# Patient Record
Sex: FEMALE | Race: WHITE | NOT HISPANIC OR LATINO | Employment: OTHER | URBAN - METROPOLITAN AREA
[De-identification: names, ages, dates, MRNs, and addresses within clinical notes are randomized per-mention and may not be internally consistent; named-entity substitution may affect disease eponyms.]

---

## 2023-10-01 ENCOUNTER — HOSPITAL ENCOUNTER (INPATIENT)
Facility: HOSPITAL | Age: 68
LOS: 10 days | Discharge: NURSING, REHAB OR LONG-TERM CARE | DRG: 871 | End: 2023-10-12
Attending: STUDENT IN AN ORGANIZED HEALTH CARE EDUCATION/TRAINING PROGRAM | Admitting: INTERNAL MEDICINE
Payer: MEDICAID

## 2023-10-01 ENCOUNTER — APPOINTMENT (OUTPATIENT)
Dept: RADIOLOGY | Facility: HOSPITAL | Age: 68
DRG: 871 | End: 2023-10-01
Payer: MEDICAID

## 2023-10-01 DIAGNOSIS — I69.359 CVA, OLD, HEMIPARESIS (MULTI): ICD-10-CM

## 2023-10-01 DIAGNOSIS — F41.9 ANXIETY: ICD-10-CM

## 2023-10-01 DIAGNOSIS — J96.90 RESPIRATORY FAILURE (MULTI): Primary | ICD-10-CM

## 2023-10-01 DIAGNOSIS — J98.11 COLLAPSE OF LEFT LUNG: ICD-10-CM

## 2023-10-01 DIAGNOSIS — R13.12 OROPHARYNGEAL DYSPHAGIA: ICD-10-CM

## 2023-10-01 DIAGNOSIS — A41.9 SEPSIS, DUE TO UNSPECIFIED ORGANISM, UNSPECIFIED WHETHER ACUTE ORGAN DYSFUNCTION PRESENT (MULTI): ICD-10-CM

## 2023-10-01 DIAGNOSIS — J44.1 COPD EXACERBATION (MULTI): ICD-10-CM

## 2023-10-01 DIAGNOSIS — J18.9 PNEUMONIA DUE TO INFECTIOUS ORGANISM, UNSPECIFIED LATERALITY, UNSPECIFIED PART OF LUNG: ICD-10-CM

## 2023-10-01 DIAGNOSIS — J90 PLEURAL EFFUSION ON LEFT: ICD-10-CM

## 2023-10-01 DIAGNOSIS — Z09 HOSPITAL DISCHARGE FOLLOW-UP: ICD-10-CM

## 2023-10-01 DIAGNOSIS — J96.10 CHRONIC RESPIRATORY FAILURE, UNSPECIFIED WHETHER WITH HYPOXIA OR HYPERCAPNIA (MULTI): ICD-10-CM

## 2023-10-01 DIAGNOSIS — J96.11 CHRONIC RESPIRATORY FAILURE WITH HYPOXIA (MULTI): ICD-10-CM

## 2023-10-01 PROCEDURE — 96375 TX/PRO/DX INJ NEW DRUG ADDON: CPT

## 2023-10-01 PROCEDURE — 96365 THER/PROPH/DIAG IV INF INIT: CPT

## 2023-10-01 PROCEDURE — 99285 EMERGENCY DEPT VISIT HI MDM: CPT | Mod: 25 | Performed by: STUDENT IN AN ORGANIZED HEALTH CARE EDUCATION/TRAINING PROGRAM

## 2023-10-01 PROCEDURE — 96367 TX/PROPH/DG ADDL SEQ IV INF: CPT

## 2023-10-01 PROCEDURE — 71045 X-RAY EXAM CHEST 1 VIEW: CPT | Mod: FR

## 2023-10-01 PROCEDURE — 71045 X-RAY EXAM CHEST 1 VIEW: CPT | Mod: FOREIGN READ | Performed by: RADIOLOGY

## 2023-10-01 RX ORDER — VANCOMYCIN HYDROCHLORIDE 1 G/200ML
1 INJECTION, SOLUTION INTRAVENOUS ONCE
Status: COMPLETED | OUTPATIENT
Start: 2023-10-01 | End: 2023-10-02

## 2023-10-01 ASSESSMENT — LIFESTYLE VARIABLES
EVER HAD A DRINK FIRST THING IN THE MORNING TO STEADY YOUR NERVES TO GET RID OF A HANGOVER: NO
EVER FELT BAD OR GUILTY ABOUT YOUR DRINKING: NO
HAVE PEOPLE ANNOYED YOU BY CRITICIZING YOUR DRINKING: NO
HAVE YOU EVER FELT YOU SHOULD CUT DOWN ON YOUR DRINKING: NO

## 2023-10-01 ASSESSMENT — COLUMBIA-SUICIDE SEVERITY RATING SCALE - C-SSRS
6. HAVE YOU EVER DONE ANYTHING, STARTED TO DO ANYTHING, OR PREPARED TO DO ANYTHING TO END YOUR LIFE?: NO
2. HAVE YOU ACTUALLY HAD ANY THOUGHTS OF KILLING YOURSELF?: NO
1. IN THE PAST MONTH, HAVE YOU WISHED YOU WERE DEAD OR WISHED YOU COULD GO TO SLEEP AND NOT WAKE UP?: NO

## 2023-10-01 ASSESSMENT — PAIN DESCRIPTION - LOCATION: LOCATION: BACK

## 2023-10-01 ASSESSMENT — PAIN SCALES - GENERAL: PAINLEVEL_OUTOF10: 4

## 2023-10-01 ASSESSMENT — PAIN - FUNCTIONAL ASSESSMENT: PAIN_FUNCTIONAL_ASSESSMENT: 0-10

## 2023-10-01 ASSESSMENT — PAIN DESCRIPTION - PAIN TYPE: TYPE: CHRONIC PAIN

## 2023-10-02 ENCOUNTER — APPOINTMENT (OUTPATIENT)
Dept: RADIOLOGY | Facility: HOSPITAL | Age: 68
DRG: 871 | End: 2023-10-02
Payer: MEDICAID

## 2023-10-02 PROBLEM — F03.92 DEMENTIA WITH PSYCHOSIS (MULTI): Status: ACTIVE | Noted: 2022-08-01

## 2023-10-02 PROBLEM — J96.90 RESPIRATORY FAILURE (MULTI): Status: ACTIVE | Noted: 2023-10-02

## 2023-10-02 PROBLEM — D50.9 IRON DEFICIENCY ANEMIA: Chronic | Status: ACTIVE | Noted: 2023-10-02

## 2023-10-02 PROBLEM — E43 SEVERE PROTEIN-CALORIE MALNUTRITION (MULTI): Status: ACTIVE | Noted: 2022-08-01

## 2023-10-02 LAB
ABO GROUP (TYPE) IN BLOOD: NORMAL
ACANTHOCYTES BLD QL SMEAR: NORMAL
ALBUMIN SERPL BCP-MCNC: 3.8 G/DL (ref 3.4–5)
ALBUMIN SERPL BCP-MCNC: 4.1 G/DL (ref 3.4–5)
ALP SERPL-CCNC: 121 U/L (ref 33–136)
ALP SERPL-CCNC: 147 U/L (ref 33–136)
ALT SERPL W P-5'-P-CCNC: 18 U/L (ref 7–45)
ALT SERPL W P-5'-P-CCNC: 18 U/L (ref 7–45)
ANION GAP BLDV CALCULATED.4IONS-SCNC: 11 MMOL/L (ref 10–25)
ANION GAP BLDV CALCULATED.4IONS-SCNC: 9 MMOL/L (ref 10–25)
ANION GAP SERPL CALC-SCNC: 15 MMOL/L (ref 10–20)
ANION GAP SERPL CALC-SCNC: 16 MMOL/L (ref 10–20)
ANTIBODY SCREEN: NORMAL
APPARATUS: ABNORMAL
APPARATUS: ABNORMAL
APPEARANCE UR: CLEAR
AST SERPL W P-5'-P-CCNC: 27 U/L (ref 9–39)
AST SERPL W P-5'-P-CCNC: 37 U/L (ref 9–39)
BACTERIA #/AREA URNS AUTO: ABNORMAL /HPF
BASE EXCESS BLDV CALC-SCNC: -4.4 MMOL/L (ref -2–3)
BASE EXCESS BLDV CALC-SCNC: 0.3 MMOL/L (ref -2–3)
BASOPHILS # BLD AUTO: 0.02 X10*3/UL (ref 0–0.1)
BASOPHILS # BLD AUTO: 0.05 X10*3/UL (ref 0–0.1)
BASOPHILS NFR BLD AUTO: 0.3 %
BASOPHILS NFR BLD AUTO: 0.4 %
BILIRUB SERPL-MCNC: 0.3 MG/DL (ref 0–1.2)
BILIRUB SERPL-MCNC: 0.5 MG/DL (ref 0–1.2)
BILIRUB UR STRIP.AUTO-MCNC: NEGATIVE MG/DL
BLOOD EXPIRATION DATE: NORMAL
BLOOD EXPIRATION DATE: NORMAL
BNP SERPL-MCNC: 885 PG/ML (ref 0–99)
BODY TEMPERATURE: 37 DEGREES CELSIUS
BODY TEMPERATURE: 37 DEGREES CELSIUS
BUN SERPL-MCNC: 16 MG/DL (ref 6–23)
BUN SERPL-MCNC: 18 MG/DL (ref 6–23)
CA-I BLDV-SCNC: 1.01 MMOL/L (ref 1.1–1.33)
CA-I BLDV-SCNC: 1.28 MMOL/L (ref 1.1–1.33)
CALCIUM SERPL-MCNC: 8.5 MG/DL (ref 8.6–10.3)
CALCIUM SERPL-MCNC: 9 MG/DL (ref 8.6–10.3)
CARDIAC TROPONIN I PNL SERPL HS: 1201 NG/L (ref 0–13)
CARDIAC TROPONIN I PNL SERPL HS: 2074 NG/L (ref 0–13)
CHLORIDE BLDV-SCNC: 102 MMOL/L (ref 98–107)
CHLORIDE BLDV-SCNC: 110 MMOL/L (ref 98–107)
CHLORIDE SERPL-SCNC: 100 MMOL/L (ref 98–107)
CHLORIDE SERPL-SCNC: 104 MMOL/L (ref 98–107)
CO2 SERPL-SCNC: 22 MMOL/L (ref 21–32)
CO2 SERPL-SCNC: 26 MMOL/L (ref 21–32)
COLOR UR: ABNORMAL
CREAT SERPL-MCNC: 0.82 MG/DL (ref 0.5–1.05)
CREAT SERPL-MCNC: 0.94 MG/DL (ref 0.5–1.05)
D DIMER PPP FEU-MCNC: 1950 NG/ML FEU
DACRYOCYTES BLD QL SMEAR: NORMAL
DISPENSE STATUS: NORMAL
DISPENSE STATUS: NORMAL
EOSINOPHIL # BLD AUTO: 0.01 X10*3/UL (ref 0–0.7)
EOSINOPHIL # BLD AUTO: 0.24 X10*3/UL (ref 0–0.7)
EOSINOPHIL NFR BLD AUTO: 0.1 %
EOSINOPHIL NFR BLD AUTO: 1.8 %
ERYTHROCYTE [DISTWIDTH] IN BLOOD BY AUTOMATED COUNT: 17.4 % (ref 11.5–14.5)
ERYTHROCYTE [DISTWIDTH] IN BLOOD BY AUTOMATED COUNT: 17.7 % (ref 11.5–14.5)
ERYTHROCYTE [DISTWIDTH] IN BLOOD BY AUTOMATED COUNT: 18.9 % (ref 11.5–14.5)
FERRITIN SERPL-MCNC: 19 NG/ML (ref 8–150)
GFR SERPL CREATININE-BSD FRML MDRD: 65 ML/MIN/1.73M*2
GFR SERPL CREATININE-BSD FRML MDRD: 77 ML/MIN/1.73M*2
GIANT PLATELETS BLD QL SMEAR: NORMAL
GLUCOSE BLDV-MCNC: 145 MG/DL (ref 74–99)
GLUCOSE BLDV-MCNC: 149 MG/DL (ref 74–99)
GLUCOSE SERPL-MCNC: 140 MG/DL (ref 74–99)
GLUCOSE SERPL-MCNC: 156 MG/DL (ref 74–99)
GLUCOSE UR STRIP.AUTO-MCNC: ABNORMAL MG/DL
HCO3 BLDV-SCNC: 20 MMOL/L (ref 22–26)
HCO3 BLDV-SCNC: 26.6 MMOL/L (ref 22–26)
HCT VFR BLD AUTO: 20.1 % (ref 36–46)
HCT VFR BLD AUTO: 24.2 % (ref 36–46)
HCT VFR BLD AUTO: 28.9 % (ref 36–46)
HCT VFR BLD EST: 14 % (ref 36–46)
HCT VFR BLD EST: 25 % (ref 36–46)
HEMOCCULT SP1 STL QL: POSITIVE
HGB BLD-MCNC: 6 G/DL (ref 12–16)
HGB BLD-MCNC: 7.2 G/DL (ref 12–16)
HGB BLD-MCNC: 8.9 G/DL (ref 12–16)
HGB BLDV-MCNC: 4.7 G/DL (ref 12–16)
HGB BLDV-MCNC: 8.4 G/DL (ref 12–16)
HYPOCHROMIA BLD QL SMEAR: NORMAL
IMM GRANULOCYTES # BLD AUTO: 0.03 X10*3/UL (ref 0–0.7)
IMM GRANULOCYTES # BLD AUTO: 0.05 X10*3/UL (ref 0–0.7)
IMM GRANULOCYTES NFR BLD AUTO: 0.4 % (ref 0–0.9)
IMM GRANULOCYTES NFR BLD AUTO: 0.4 % (ref 0–0.9)
INHALED O2 CONCENTRATION: 100 %
INHALED O2 CONCENTRATION: 28 %
IRON SATN MFR SERPL: 4 % (ref 25–45)
IRON SERPL-MCNC: 18 UG/DL (ref 35–150)
KETONES UR STRIP.AUTO-MCNC: NEGATIVE MG/DL
LACTATE BLDV-SCNC: 2 MMOL/L (ref 0.4–2)
LACTATE BLDV-SCNC: 2.4 MMOL/L (ref 0.4–2)
LACTATE SERPL-SCNC: 2 MMOL/L (ref 0.4–2)
LEUKOCYTE ESTERASE UR QL STRIP.AUTO: ABNORMAL
LYMPHOCYTES # BLD AUTO: 0.43 X10*3/UL (ref 1.2–4.8)
LYMPHOCYTES # BLD AUTO: 1.55 X10*3/UL (ref 1.2–4.8)
LYMPHOCYTES NFR BLD AUTO: 11.8 %
LYMPHOCYTES NFR BLD AUTO: 5.8 %
MAGNESIUM SERPL-MCNC: 1.8 MG/DL (ref 1.6–2.4)
MCH RBC QN AUTO: 22 PG (ref 26–34)
MCH RBC QN AUTO: 22.9 PG (ref 26–34)
MCH RBC QN AUTO: 23.9 PG (ref 26–34)
MCHC RBC AUTO-ENTMCNC: 29.8 G/DL (ref 32–36)
MCHC RBC AUTO-ENTMCNC: 29.9 G/DL (ref 32–36)
MCHC RBC AUTO-ENTMCNC: 30.8 G/DL (ref 32–36)
MCV RBC AUTO: 74 FL (ref 80–100)
MCV RBC AUTO: 77 FL (ref 80–100)
MCV RBC AUTO: 78 FL (ref 80–100)
MONOCYTES # BLD AUTO: 0.05 X10*3/UL (ref 0.1–1)
MONOCYTES # BLD AUTO: 0.75 X10*3/UL (ref 0.1–1)
MONOCYTES NFR BLD AUTO: 0.7 %
MONOCYTES NFR BLD AUTO: 5.7 %
NEUTROPHILS # BLD AUTO: 10.47 X10*3/UL (ref 1.2–7.7)
NEUTROPHILS # BLD AUTO: 6.91 X10*3/UL (ref 1.2–7.7)
NEUTROPHILS NFR BLD AUTO: 79.9 %
NEUTROPHILS NFR BLD AUTO: 92.7 %
NITRITE UR QL STRIP.AUTO: NEGATIVE
NRBC BLD-RTO: 0 /100 WBCS (ref 0–0)
OVALOCYTES BLD QL SMEAR: NORMAL
OXYHGB MFR BLDV: 45.3 % (ref 45–75)
OXYHGB MFR BLDV: 87.8 % (ref 45–75)
PCO2 BLDV: 33 MM HG (ref 41–51)
PCO2 BLDV: 54 MM HG (ref 41–51)
PH BLDV: 7.3 PH (ref 7.33–7.43)
PH BLDV: 7.39 PH (ref 7.33–7.43)
PH UR STRIP.AUTO: 7 [PH]
PHOSPHATE SERPL-MCNC: 3 MG/DL (ref 2.5–4.9)
PLATELET # BLD AUTO: 360 X10*3/UL (ref 150–450)
PLATELET # BLD AUTO: 401 X10*3/UL (ref 150–450)
PLATELET # BLD AUTO: 411 X10*3/UL (ref 150–450)
PMV BLD AUTO: 10.9 FL (ref 7.5–11.5)
PMV BLD AUTO: 11.4 FL (ref 7.5–11.5)
PMV BLD AUTO: 11.9 FL (ref 7.5–11.5)
PO2 BLDV: 37 MM HG (ref 35–45)
PO2 BLDV: 55 MM HG (ref 35–45)
POLYCHROMASIA BLD QL SMEAR: NORMAL
POTASSIUM BLDV-SCNC: 3.2 MMOL/L (ref 3.5–5.3)
POTASSIUM BLDV-SCNC: 3.7 MMOL/L (ref 3.5–5.3)
POTASSIUM SERPL-SCNC: 3.4 MMOL/L (ref 3.5–5.3)
POTASSIUM SERPL-SCNC: 4.1 MMOL/L (ref 3.5–5.3)
PROCALCITONIN SERPL-MCNC: 0.16 NG/ML
PRODUCT BLOOD TYPE: 9500
PRODUCT BLOOD TYPE: 9500
PRODUCT CODE: NORMAL
PRODUCT CODE: NORMAL
PROT SERPL-MCNC: 6.4 G/DL (ref 6.4–8.2)
PROT SERPL-MCNC: 6.8 G/DL (ref 6.4–8.2)
PROT UR STRIP.AUTO-MCNC: ABNORMAL MG/DL
RBC # BLD AUTO: 2.73 X10*6/UL (ref 4–5.2)
RBC # BLD AUTO: 3.14 X10*6/UL (ref 4–5.2)
RBC # BLD AUTO: 3.72 X10*6/UL (ref 4–5.2)
RBC # UR STRIP.AUTO: NEGATIVE /UL
RBC #/AREA URNS AUTO: ABNORMAL /HPF
RBC MORPH BLD: NORMAL
RH FACTOR (ANTIGEN D): NORMAL
SAO2 % BLDV: 46 % (ref 45–75)
SAO2 % BLDV: 90 % (ref 45–75)
SARS-COV-2 RNA RESP QL NAA+PROBE: NOT DETECTED
SCHISTOCYTES BLD QL SMEAR: NORMAL
SODIUM BLDV-SCNC: 136 MMOL/L (ref 136–145)
SODIUM BLDV-SCNC: 136 MMOL/L (ref 136–145)
SODIUM SERPL-SCNC: 137 MMOL/L (ref 136–145)
SODIUM SERPL-SCNC: 139 MMOL/L (ref 136–145)
SP GR UR STRIP.AUTO: 1.01
TIBC SERPL-MCNC: 418 UG/DL (ref 240–445)
UIBC SERPL-MCNC: 400 UG/DL (ref 110–370)
UNIT ABO: NORMAL
UNIT ABO: NORMAL
UNIT NUMBER: NORMAL
UNIT NUMBER: NORMAL
UNIT RH: NORMAL
UNIT RH: NORMAL
UNIT VOLUME: 350
UNIT VOLUME: 350
UROBILINOGEN UR STRIP.AUTO-MCNC: <2 MG/DL
WBC # BLD AUTO: 13.1 X10*3/UL (ref 4.4–11.3)
WBC # BLD AUTO: 7 X10*3/UL (ref 4.4–11.3)
WBC # BLD AUTO: 7.5 X10*3/UL (ref 4.4–11.3)
WBC #/AREA URNS AUTO: ABNORMAL /HPF
XM INTEP: NORMAL
XM INTEP: NORMAL

## 2023-10-02 PROCEDURE — 94664 DEMO&/EVAL PT USE INHALER: CPT

## 2023-10-02 PROCEDURE — 2500000004 HC RX 250 GENERAL PHARMACY W/ HCPCS (ALT 636 FOR OP/ED)

## 2023-10-02 PROCEDURE — 84075 ASSAY ALKALINE PHOSPHATASE: CPT

## 2023-10-02 PROCEDURE — 2500000002 HC RX 250 W HCPCS SELF ADMINISTERED DRUGS (ALT 637 FOR MEDICARE OP, ALT 636 FOR OP/ED)

## 2023-10-02 PROCEDURE — 2500000005 HC RX 250 GENERAL PHARMACY W/O HCPCS: Performed by: STUDENT IN AN ORGANIZED HEALTH CARE EDUCATION/TRAINING PROGRAM

## 2023-10-02 PROCEDURE — 84100 ASSAY OF PHOSPHORUS: CPT

## 2023-10-02 PROCEDURE — 2500000004 HC RX 250 GENERAL PHARMACY W/ HCPCS (ALT 636 FOR OP/ED): Performed by: PHYSICIAN ASSISTANT

## 2023-10-02 PROCEDURE — 83605 ASSAY OF LACTIC ACID: CPT | Performed by: STUDENT IN AN ORGANIZED HEALTH CARE EDUCATION/TRAINING PROGRAM

## 2023-10-02 PROCEDURE — 84132 ASSAY OF SERUM POTASSIUM: CPT

## 2023-10-02 PROCEDURE — 36415 COLL VENOUS BLD VENIPUNCTURE: CPT | Performed by: STUDENT IN AN ORGANIZED HEALTH CARE EDUCATION/TRAINING PROGRAM

## 2023-10-02 PROCEDURE — 81001 URINALYSIS AUTO W/SCOPE: CPT | Performed by: STUDENT IN AN ORGANIZED HEALTH CARE EDUCATION/TRAINING PROGRAM

## 2023-10-02 PROCEDURE — 85379 FIBRIN DEGRADATION QUANT: CPT | Performed by: STUDENT IN AN ORGANIZED HEALTH CARE EDUCATION/TRAINING PROGRAM

## 2023-10-02 PROCEDURE — 99223 1ST HOSP IP/OBS HIGH 75: CPT | Performed by: INTERNAL MEDICINE

## 2023-10-02 PROCEDURE — 2500000002 HC RX 250 W HCPCS SELF ADMINISTERED DRUGS (ALT 637 FOR MEDICARE OP, ALT 636 FOR OP/ED): Performed by: INTERNAL MEDICINE

## 2023-10-02 PROCEDURE — 36430 TRANSFUSION BLD/BLD COMPNT: CPT

## 2023-10-02 PROCEDURE — 2500000005 HC RX 250 GENERAL PHARMACY W/O HCPCS: Performed by: INTERNAL MEDICINE

## 2023-10-02 PROCEDURE — 84145 PROCALCITONIN (PCT): CPT | Mod: CMCLAB,GEALAB

## 2023-10-02 PROCEDURE — 2550000001 HC RX 255 CONTRASTS: Performed by: STUDENT IN AN ORGANIZED HEALTH CARE EDUCATION/TRAINING PROGRAM

## 2023-10-02 PROCEDURE — 85025 COMPLETE CBC W/AUTO DIFF WBC: CPT

## 2023-10-02 PROCEDURE — P9016 RBC LEUKOCYTES REDUCED: HCPCS

## 2023-10-02 PROCEDURE — 71275 CT ANGIOGRAPHY CHEST: CPT | Mod: FR,ME

## 2023-10-02 PROCEDURE — 94660 CPAP INITIATION&MGMT: CPT

## 2023-10-02 PROCEDURE — 82270 OCCULT BLOOD FECES: CPT | Performed by: STUDENT IN AN ORGANIZED HEALTH CARE EDUCATION/TRAINING PROGRAM

## 2023-10-02 PROCEDURE — 71275 CT ANGIOGRAPHY CHEST: CPT | Mod: FOREIGN READ | Performed by: RADIOLOGY

## 2023-10-02 PROCEDURE — 84484 ASSAY OF TROPONIN QUANT: CPT | Performed by: STUDENT IN AN ORGANIZED HEALTH CARE EDUCATION/TRAINING PROGRAM

## 2023-10-02 PROCEDURE — 86901 BLOOD TYPING SEROLOGIC RH(D): CPT | Performed by: STUDENT IN AN ORGANIZED HEALTH CARE EDUCATION/TRAINING PROGRAM

## 2023-10-02 PROCEDURE — 83880 ASSAY OF NATRIURETIC PEPTIDE: CPT | Performed by: STUDENT IN AN ORGANIZED HEALTH CARE EDUCATION/TRAINING PROGRAM

## 2023-10-02 PROCEDURE — 84132 ASSAY OF SERUM POTASSIUM: CPT | Performed by: STUDENT IN AN ORGANIZED HEALTH CARE EDUCATION/TRAINING PROGRAM

## 2023-10-02 PROCEDURE — 83540 ASSAY OF IRON: CPT

## 2023-10-02 PROCEDURE — 2500000004 HC RX 250 GENERAL PHARMACY W/ HCPCS (ALT 636 FOR OP/ED): Performed by: STUDENT IN AN ORGANIZED HEALTH CARE EDUCATION/TRAINING PROGRAM

## 2023-10-02 PROCEDURE — 87075 CULTR BACTERIA EXCEPT BLOOD: CPT | Mod: CMCLAB,GEALAB | Performed by: STUDENT IN AN ORGANIZED HEALTH CARE EDUCATION/TRAINING PROGRAM

## 2023-10-02 PROCEDURE — 2060000001 HC INTERMEDIATE ICU ROOM DAILY

## 2023-10-02 PROCEDURE — 85025 COMPLETE CBC W/AUTO DIFF WBC: CPT | Performed by: STUDENT IN AN ORGANIZED HEALTH CARE EDUCATION/TRAINING PROGRAM

## 2023-10-02 PROCEDURE — 85027 COMPLETE CBC AUTOMATED: CPT

## 2023-10-02 PROCEDURE — 87635 SARS-COV-2 COVID-19 AMP PRB: CPT | Performed by: STUDENT IN AN ORGANIZED HEALTH CARE EDUCATION/TRAINING PROGRAM

## 2023-10-02 PROCEDURE — P9040 RBC LEUKOREDUCED IRRADIATED: HCPCS

## 2023-10-02 PROCEDURE — 94640 AIRWAY INHALATION TREATMENT: CPT

## 2023-10-02 PROCEDURE — 82728 ASSAY OF FERRITIN: CPT

## 2023-10-02 PROCEDURE — C9113 INJ PANTOPRAZOLE SODIUM, VIA: HCPCS

## 2023-10-02 PROCEDURE — 87040 BLOOD CULTURE FOR BACTERIA: CPT | Mod: CMCLAB,GEALAB | Performed by: STUDENT IN AN ORGANIZED HEALTH CARE EDUCATION/TRAINING PROGRAM

## 2023-10-02 PROCEDURE — 2500000001 HC RX 250 WO HCPCS SELF ADMINISTERED DRUGS (ALT 637 FOR MEDICARE OP)

## 2023-10-02 PROCEDURE — 83735 ASSAY OF MAGNESIUM: CPT

## 2023-10-02 PROCEDURE — 99254 IP/OBS CNSLTJ NEW/EST MOD 60: CPT | Performed by: PHYSICIAN ASSISTANT

## 2023-10-02 PROCEDURE — 86920 COMPATIBILITY TEST SPIN: CPT

## 2023-10-02 RX ORDER — MIRTAZAPINE 15 MG/1
15 TABLET, FILM COATED ORAL EVERY 24 HOURS
COMMUNITY

## 2023-10-02 RX ORDER — IPRATROPIUM BROMIDE AND ALBUTEROL SULFATE 2.5; .5 MG/3ML; MG/3ML
3 SOLUTION RESPIRATORY (INHALATION)
Status: DISCONTINUED | OUTPATIENT
Start: 2023-10-02 | End: 2023-10-02

## 2023-10-02 RX ORDER — ACETAMINOPHEN 325 MG/1
650 TABLET ORAL EVERY 4 HOURS PRN
Status: DISCONTINUED | OUTPATIENT
Start: 2023-10-02 | End: 2023-10-12 | Stop reason: HOSPADM

## 2023-10-02 RX ORDER — ONDANSETRON HYDROCHLORIDE 2 MG/ML
4 INJECTION, SOLUTION INTRAVENOUS EVERY 8 HOURS PRN
Status: DISCONTINUED | OUTPATIENT
Start: 2023-10-02 | End: 2023-10-04

## 2023-10-02 RX ORDER — ACETAMINOPHEN 325 MG/1
325 TABLET ORAL EVERY 6 HOURS PRN
Status: DISCONTINUED | OUTPATIENT
Start: 2023-10-02 | End: 2023-10-05

## 2023-10-02 RX ORDER — ACETAMINOPHEN 325 MG/1
325 TABLET ORAL EVERY 6 HOURS PRN
COMMUNITY

## 2023-10-02 RX ORDER — POTASSIUM CHLORIDE 14.9 MG/ML
20 INJECTION INTRAVENOUS
Status: COMPLETED | OUTPATIENT
Start: 2023-10-02 | End: 2023-10-02

## 2023-10-02 RX ORDER — IPRATROPIUM BROMIDE AND ALBUTEROL SULFATE 2.5; .5 MG/3ML; MG/3ML
3 SOLUTION RESPIRATORY (INHALATION)
Status: DISCONTINUED | OUTPATIENT
Start: 2023-10-02 | End: 2023-10-03

## 2023-10-02 RX ORDER — ONDANSETRON 4 MG/1
4 TABLET, FILM COATED ORAL EVERY 6 HOURS PRN
COMMUNITY

## 2023-10-02 RX ORDER — AMLODIPINE BESYLATE 2.5 MG/1
10 TABLET ORAL
Status: ON HOLD | COMMUNITY
End: 2024-02-05 | Stop reason: ALTCHOICE

## 2023-10-02 RX ORDER — FERROUS SULFATE 325(65) MG
65 TABLET ORAL
Status: DISCONTINUED | OUTPATIENT
Start: 2023-10-02 | End: 2023-10-10

## 2023-10-02 RX ORDER — ATORVASTATIN CALCIUM 80 MG/1
80 TABLET, FILM COATED ORAL NIGHTLY
Status: DISCONTINUED | OUTPATIENT
Start: 2023-10-02 | End: 2023-10-12 | Stop reason: HOSPADM

## 2023-10-02 RX ORDER — FLUTICASONE FUROATE AND VILANTEROL 100; 25 UG/1; UG/1
1 POWDER RESPIRATORY (INHALATION) DAILY
Status: DISCONTINUED | OUTPATIENT
Start: 2023-10-02 | End: 2023-10-12 | Stop reason: HOSPADM

## 2023-10-02 RX ORDER — GUAIFENESIN 100 MG/5ML
100 SOLUTION ORAL 4 TIMES DAILY PRN
COMMUNITY

## 2023-10-02 RX ORDER — PANTOPRAZOLE SODIUM 40 MG/10ML
40 INJECTION, POWDER, LYOPHILIZED, FOR SOLUTION INTRAVENOUS DAILY
Status: DISCONTINUED | OUTPATIENT
Start: 2023-10-03 | End: 2023-10-05

## 2023-10-02 RX ORDER — POTASSIUM CHLORIDE 20 MEQ/1
20 TABLET, EXTENDED RELEASE ORAL DAILY
COMMUNITY

## 2023-10-02 RX ORDER — ATORVASTATIN CALCIUM 80 MG/1
1 TABLET, FILM COATED ORAL NIGHTLY
COMMUNITY
Start: 2022-11-18

## 2023-10-02 RX ORDER — PANTOPRAZOLE SODIUM 40 MG/1
40 TABLET, DELAYED RELEASE ORAL
COMMUNITY
Start: 2022-11-27

## 2023-10-02 RX ORDER — MIRTAZAPINE 15 MG/1
15 TABLET, FILM COATED ORAL EVERY 24 HOURS
Status: DISCONTINUED | OUTPATIENT
Start: 2023-10-02 | End: 2023-10-12 | Stop reason: HOSPADM

## 2023-10-02 RX ORDER — CALCIUM CARBONATE 500(1250)
1 TABLET ORAL EVERY 12 HOURS
COMMUNITY
Start: 2022-08-24

## 2023-10-02 RX ORDER — GUAIFENESIN 100 MG/5ML
100 SOLUTION ORAL 4 TIMES DAILY PRN
Status: DISCONTINUED | OUTPATIENT
Start: 2023-10-02 | End: 2023-10-12 | Stop reason: HOSPADM

## 2023-10-02 RX ORDER — ACETAMINOPHEN 650 MG/1
650 SUPPOSITORY RECTAL EVERY 4 HOURS PRN
Status: DISCONTINUED | OUTPATIENT
Start: 2023-10-02 | End: 2023-10-04

## 2023-10-02 RX ORDER — TRAMADOL HYDROCHLORIDE 50 MG/1
50 TABLET ORAL EVERY 6 HOURS PRN
COMMUNITY
Start: 2022-11-04

## 2023-10-02 RX ORDER — PANTOPRAZOLE SODIUM 40 MG/10ML
80 INJECTION, POWDER, LYOPHILIZED, FOR SOLUTION INTRAVENOUS ONCE
Status: COMPLETED | OUTPATIENT
Start: 2023-10-02 | End: 2023-10-02

## 2023-10-02 RX ORDER — NITROGLYCERIN 0.4 MG/1
0.4 TABLET SUBLINGUAL EVERY 5 MIN PRN
COMMUNITY

## 2023-10-02 RX ORDER — VANCOMYCIN HYDROCHLORIDE 500 MG/100ML
500 INJECTION, SOLUTION INTRAVENOUS EVERY 12 HOURS
Status: DISCONTINUED | OUTPATIENT
Start: 2023-10-02 | End: 2023-10-02

## 2023-10-02 RX ORDER — PREDNISONE 20 MG/1
40 TABLET ORAL DAILY
Status: COMPLETED | OUTPATIENT
Start: 2023-10-02 | End: 2023-10-06

## 2023-10-02 RX ORDER — ASPIRIN 81 MG/1
81 TABLET ORAL 2 TIMES DAILY
COMMUNITY
Start: 2022-11-04

## 2023-10-02 RX ORDER — IPRATROPIUM BROMIDE AND ALBUTEROL SULFATE 2.5; .5 MG/3ML; MG/3ML
3 SOLUTION RESPIRATORY (INHALATION) EVERY 2 HOUR PRN
Status: DISCONTINUED | OUTPATIENT
Start: 2023-10-02 | End: 2023-10-12 | Stop reason: HOSPADM

## 2023-10-02 RX ORDER — AMLODIPINE BESYLATE 5 MG/1
10 TABLET ORAL
Status: DISCONTINUED | OUTPATIENT
Start: 2023-10-02 | End: 2023-10-10

## 2023-10-02 RX ORDER — SERTRALINE HYDROCHLORIDE 25 MG/1
25 TABLET, FILM COATED ORAL DAILY
COMMUNITY

## 2023-10-02 RX ORDER — SERTRALINE HYDROCHLORIDE 50 MG/1
25 TABLET, FILM COATED ORAL DAILY
Status: DISCONTINUED | OUTPATIENT
Start: 2023-10-02 | End: 2023-10-12 | Stop reason: HOSPADM

## 2023-10-02 RX ORDER — ACETAMINOPHEN 500 MG
5 TABLET ORAL NIGHTLY
Status: DISCONTINUED | OUTPATIENT
Start: 2023-10-02 | End: 2023-10-12 | Stop reason: HOSPADM

## 2023-10-02 RX ORDER — ACETAMINOPHEN 160 MG/5ML
650 SOLUTION ORAL EVERY 4 HOURS PRN
Status: DISCONTINUED | OUTPATIENT
Start: 2023-10-02 | End: 2023-10-04

## 2023-10-02 RX ORDER — ONDANSETRON 4 MG/1
4 TABLET, ORALLY DISINTEGRATING ORAL EVERY 8 HOURS PRN
Status: DISCONTINUED | OUTPATIENT
Start: 2023-10-02 | End: 2023-10-12 | Stop reason: HOSPADM

## 2023-10-02 RX ADMIN — IPRATROPIUM BROMIDE AND ALBUTEROL SULFATE 3 ML: 2.5; .5 SOLUTION RESPIRATORY (INHALATION) at 12:11

## 2023-10-02 RX ADMIN — IRON SUCROSE 200 MG: 20 INJECTION, SOLUTION INTRAVENOUS at 15:39

## 2023-10-02 RX ADMIN — PREDNISONE 40 MG: 20 TABLET ORAL at 11:36

## 2023-10-02 RX ADMIN — SERTRALINE HYDROCHLORIDE 25 MG: 50 TABLET ORAL at 11:35

## 2023-10-02 RX ADMIN — TIOTROPIUM BROMIDE INHALATION SPRAY 2 PUFF: 3.12 SPRAY, METERED RESPIRATORY (INHALATION) at 10:14

## 2023-10-02 RX ADMIN — IOHEXOL 70 ML: 350 INJECTION, SOLUTION INTRAVENOUS at 02:10

## 2023-10-02 RX ADMIN — PANTOPRAZOLE SODIUM 80 MG: 40 INJECTION, POWDER, FOR SOLUTION INTRAVENOUS at 06:50

## 2023-10-02 RX ADMIN — Medication 40 %: at 02:30

## 2023-10-02 RX ADMIN — Medication 40 %: at 05:11

## 2023-10-02 RX ADMIN — Medication 5 MG: at 21:42

## 2023-10-02 RX ADMIN — IPRATROPIUM BROMIDE AND ALBUTEROL SULFATE 3 ML: 2.5; .5 SOLUTION RESPIRATORY (INHALATION) at 07:58

## 2023-10-02 RX ADMIN — IPRATROPIUM BROMIDE AND ALBUTEROL SULFATE 3 ML: .5; 3 SOLUTION RESPIRATORY (INHALATION) at 15:08

## 2023-10-02 RX ADMIN — PIPERACILLIN SODIUM AND TAZOBACTAM SODIUM 4.5 G: 4; .5 INJECTION, SOLUTION INTRAVENOUS at 00:23

## 2023-10-02 RX ADMIN — PIPERACILLIN SODIUM AND TAZOBACTAM SODIUM 3.38 G: 3; .375 INJECTION, SOLUTION INTRAVENOUS at 06:50

## 2023-10-02 RX ADMIN — POTASSIUM CHLORIDE 20 MEQ: 14.9 INJECTION, SOLUTION INTRAVENOUS at 11:58

## 2023-10-02 RX ADMIN — POTASSIUM CHLORIDE 20 MEQ: 14.9 INJECTION, SOLUTION INTRAVENOUS at 10:12

## 2023-10-02 RX ADMIN — SODIUM CHLORIDE 1572 ML: 9 INJECTION, SOLUTION INTRAVENOUS at 00:35

## 2023-10-02 RX ADMIN — FLUTICASONE FUROATE AND VILANTEROL TRIFENATATE 1 PUFF: 100; 25 POWDER RESPIRATORY (INHALATION) at 10:14

## 2023-10-02 RX ADMIN — VANCOMYCIN HYDROCHLORIDE 1 G: 1 INJECTION, SOLUTION INTRAVENOUS at 01:20

## 2023-10-02 RX ADMIN — Medication: at 08:00

## 2023-10-02 RX ADMIN — IPRATROPIUM BROMIDE AND ALBUTEROL SULFATE 3 ML: .5; 3 SOLUTION RESPIRATORY (INHALATION) at 19:41

## 2023-10-02 RX ADMIN — METHYLPREDNISOLONE SODIUM SUCCINATE 125 MG: 125 INJECTION, POWDER, FOR SOLUTION INTRAMUSCULAR; INTRAVENOUS at 02:23

## 2023-10-02 RX ADMIN — Medication 2 L/MIN: at 08:00

## 2023-10-02 RX ADMIN — MIRTAZAPINE 15 MG: 15 TABLET, FILM COATED ORAL at 20:45

## 2023-10-02 RX ADMIN — ATORVASTATIN CALCIUM 80 MG: 80 TABLET, FILM COATED ORAL at 20:45

## 2023-10-02 SDOH — HEALTH STABILITY: MENTAL HEALTH: HOW OFTEN DO YOU HAVE 6 OR MORE DRINKS ON ONE OCCASION?: NEVER

## 2023-10-02 SDOH — SOCIAL STABILITY: SOCIAL INSECURITY: DO YOU FEEL UNSAFE GOING BACK TO THE PLACE WHERE YOU ARE LIVING?: NO

## 2023-10-02 SDOH — ECONOMIC STABILITY: INCOME INSECURITY: HOW HARD IS IT FOR YOU TO PAY FOR THE VERY BASICS LIKE FOOD, HOUSING, MEDICAL CARE, AND HEATING?: NOT HARD AT ALL

## 2023-10-02 SDOH — ECONOMIC STABILITY: TRANSPORTATION INSECURITY
IN THE PAST 12 MONTHS, HAS THE LACK OF TRANSPORTATION KEPT YOU FROM MEDICAL APPOINTMENTS OR FROM GETTING MEDICATIONS?: NO

## 2023-10-02 SDOH — ECONOMIC STABILITY: INCOME INSECURITY: IN THE LAST 12 MONTHS, WAS THERE A TIME WHEN YOU WERE NOT ABLE TO PAY THE MORTGAGE OR RENT ON TIME?: NO

## 2023-10-02 SDOH — HEALTH STABILITY: MENTAL HEALTH: HOW MANY STANDARD DRINKS CONTAINING ALCOHOL DO YOU HAVE ON A TYPICAL DAY?: PATIENT DOES NOT DRINK

## 2023-10-02 SDOH — HEALTH STABILITY: MENTAL HEALTH: HOW OFTEN DO YOU HAVE A DRINK CONTAINING ALCOHOL?: NEVER

## 2023-10-02 SDOH — ECONOMIC STABILITY: HOUSING INSECURITY
IN THE LAST 12 MONTHS, WAS THERE A TIME WHEN YOU DID NOT HAVE A STEADY PLACE TO SLEEP OR SLEPT IN A SHELTER (INCLUDING NOW)?: NO

## 2023-10-02 SDOH — ECONOMIC STABILITY: FOOD INSECURITY: WITHIN THE PAST 12 MONTHS, THE FOOD YOU BOUGHT JUST DIDN'T LAST AND YOU DIDN'T HAVE MONEY TO GET MORE.: NEVER TRUE

## 2023-10-02 SDOH — ECONOMIC STABILITY: FOOD INSECURITY: WITHIN THE PAST 12 MONTHS, YOU WORRIED THAT YOUR FOOD WOULD RUN OUT BEFORE YOU GOT MONEY TO BUY MORE.: NEVER TRUE

## 2023-10-02 SDOH — HEALTH STABILITY: PHYSICAL HEALTH: ON AVERAGE, HOW MANY MINUTES DO YOU ENGAGE IN EXERCISE AT THIS LEVEL?: 0 MIN

## 2023-10-02 SDOH — SOCIAL STABILITY: SOCIAL INSECURITY: ABUSE: ADULT

## 2023-10-02 SDOH — HEALTH STABILITY: PHYSICAL HEALTH: ON AVERAGE, HOW MANY DAYS PER WEEK DO YOU ENGAGE IN MODERATE TO STRENUOUS EXERCISE (LIKE A BRISK WALK)?: 0 DAYS

## 2023-10-02 SDOH — ECONOMIC STABILITY: HOUSING INSECURITY: IN THE LAST 12 MONTHS, HOW MANY PLACES HAVE YOU LIVED?: 1

## 2023-10-02 SDOH — ECONOMIC STABILITY: INCOME INSECURITY: IN THE PAST 12 MONTHS, HAS THE ELECTRIC, GAS, OIL, OR WATER COMPANY THREATENED TO SHUT OFF SERVICE IN YOUR HOME?: NO

## 2023-10-02 SDOH — SOCIAL STABILITY: SOCIAL INSECURITY: HAS ANYONE EVER THREATENED TO HURT YOUR FAMILY OR YOUR PETS?: NO

## 2023-10-02 SDOH — ECONOMIC STABILITY: TRANSPORTATION INSECURITY
IN THE PAST 12 MONTHS, HAS LACK OF TRANSPORTATION KEPT YOU FROM MEETINGS, WORK, OR FROM GETTING THINGS NEEDED FOR DAILY LIVING?: NO

## 2023-10-02 SDOH — SOCIAL STABILITY: SOCIAL INSECURITY: ARE THERE ANY APPARENT SIGNS OF INJURIES/BEHAVIORS THAT COULD BE RELATED TO ABUSE/NEGLECT?: NO

## 2023-10-02 SDOH — SOCIAL STABILITY: SOCIAL INSECURITY: DOES ANYONE TRY TO KEEP YOU FROM HAVING/CONTACTING OTHER FRIENDS OR DOING THINGS OUTSIDE YOUR HOME?: NO

## 2023-10-02 SDOH — SOCIAL STABILITY: SOCIAL INSECURITY: HAVE YOU HAD THOUGHTS OF HARMING ANYONE ELSE?: NO

## 2023-10-02 SDOH — SOCIAL STABILITY: SOCIAL INSECURITY: WERE YOU ABLE TO COMPLETE ALL THE BEHAVIORAL HEALTH SCREENINGS?: YES

## 2023-10-02 SDOH — SOCIAL STABILITY: SOCIAL INSECURITY: DO YOU FEEL ANYONE HAS EXPLOITED OR TAKEN ADVANTAGE OF YOU FINANCIALLY OR OF YOUR PERSONAL PROPERTY?: NO

## 2023-10-02 SDOH — SOCIAL STABILITY: SOCIAL INSECURITY: ARE YOU OR HAVE YOU BEEN THREATENED OR ABUSED PHYSICALLY, EMOTIONALLY, OR SEXUALLY BY ANYONE?: NO

## 2023-10-02 ASSESSMENT — PAIN - FUNCTIONAL ASSESSMENT
PAIN_FUNCTIONAL_ASSESSMENT: 0-10

## 2023-10-02 ASSESSMENT — PATIENT HEALTH QUESTIONNAIRE - PHQ9
2. FEELING DOWN, DEPRESSED OR HOPELESS: NOT AT ALL
SUM OF ALL RESPONSES TO PHQ9 QUESTIONS 1 & 2: 0
1. LITTLE INTEREST OR PLEASURE IN DOING THINGS: NOT AT ALL

## 2023-10-02 ASSESSMENT — ACTIVITIES OF DAILY LIVING (ADL)
PATIENT'S MEMORY ADEQUATE TO SAFELY COMPLETE DAILY ACTIVITIES?: NO
HEARING - RIGHT EAR: FUNCTIONAL
ASSISTIVE_DEVICE: WHEELCHAIR
JUDGMENT_ADEQUATE_SAFELY_COMPLETE_DAILY_ACTIVITIES: YES
WALKS IN HOME: DEPENDENT
FEEDING YOURSELF: NEEDS ASSISTANCE
TOILETING: NEEDS ASSISTANCE
DRESSING YOURSELF: NEEDS ASSISTANCE
GROOMING: NEEDS ASSISTANCE
ADEQUATE_TO_COMPLETE_ADL: NO
FEEDING YOURSELF: INDEPENDENT
ASSISTIVE_DEVICE: WHEELCHAIR
TOILETING: NEEDS ASSISTANCE
DRESSING YOURSELF: NEEDS ASSISTANCE
PATIENT'S MEMORY ADEQUATE TO SAFELY COMPLETE DAILY ACTIVITIES?: YES
BATHING: NEEDS ASSISTANCE
JUDGMENT_ADEQUATE_SAFELY_COMPLETE_DAILY_ACTIVITIES: YES
BATHING: NEEDS ASSISTANCE
GROOMING: NEEDS ASSISTANCE
PATIENT'S MEMORY ADEQUATE TO SAFELY COMPLETE DAILY ACTIVITIES?: YES
ADEQUATE_TO_COMPLETE_ADL: YES
BATHING: NEEDS ASSISTANCE
DRESSING YOURSELF: NEEDS ASSISTANCE
GROOMING: NEEDS ASSISTANCE
FEEDING YOURSELF: NEEDS ASSISTANCE
TOILETING: NEEDS ASSISTANCE
JUDGMENT_ADEQUATE_SAFELY_COMPLETE_DAILY_ACTIVITIES: NO
WALKS IN HOME: NEEDS ASSISTANCE
HEARING - LEFT EAR: FUNCTIONAL
ADEQUATE_TO_COMPLETE_ADL: YES
WALKS IN HOME: DEPENDENT

## 2023-10-02 ASSESSMENT — COGNITIVE AND FUNCTIONAL STATUS - GENERAL
MOBILITY SCORE: 12
CLIMB 3 TO 5 STEPS WITH RAILING: TOTAL
MOVING TO AND FROM BED TO CHAIR: A LOT
TURNING FROM BACK TO SIDE WHILE IN FLAT BAD: A LITTLE
STANDING UP FROM CHAIR USING ARMS: A LOT
MOVING FROM LYING ON BACK TO SITTING ON SIDE OF FLAT BED WITH BEDRAILS: A LITTLE
EATING MEALS: A LITTLE
EATING MEALS: A LITTLE
WALKING IN HOSPITAL ROOM: TOTAL
HELP NEEDED FOR BATHING: A LOT
DRESSING REGULAR LOWER BODY CLOTHING: TOTAL
HELP NEEDED FOR BATHING: A LOT
TOILETING: A LOT
DAILY ACTIVITIY SCORE: 14
DRESSING REGULAR UPPER BODY CLOTHING: A LITTLE
MOVING FROM LYING ON BACK TO SITTING ON SIDE OF FLAT BED WITH BEDRAILS: A LITTLE
MOVING TO AND FROM BED TO CHAIR: A LOT
STANDING UP FROM CHAIR USING ARMS: A LOT
PERSONAL GROOMING: A LITTLE
TURNING FROM BACK TO SIDE WHILE IN FLAT BAD: A LITTLE
DAILY ACTIVITIY SCORE: 14
WALKING IN HOSPITAL ROOM: TOTAL
MOBILITY SCORE: 12
DRESSING REGULAR LOWER BODY CLOTHING: TOTAL
PERSONAL GROOMING: A LITTLE
TOILETING: A LOT
CLIMB 3 TO 5 STEPS WITH RAILING: TOTAL
DRESSING REGULAR UPPER BODY CLOTHING: A LITTLE
PATIENT BASELINE BEDBOUND: YES

## 2023-10-02 ASSESSMENT — LIFESTYLE VARIABLES
HOW MANY STANDARD DRINKS CONTAINING ALCOHOL DO YOU HAVE ON A TYPICAL DAY: PATIENT DOES NOT DRINK
HOW OFTEN DO YOU HAVE A DRINK CONTAINING ALCOHOL: NEVER
SKIP TO QUESTIONS 9-10: 1
AUDIT-C TOTAL SCORE: 0
AUDIT-C TOTAL SCORE: 0
SKIP TO QUESTIONS 9-10: 1
AUDIT-C TOTAL SCORE: 0
HOW OFTEN DO YOU HAVE 6 OR MORE DRINKS ON ONE OCCASION: NEVER
PRESCIPTION_ABUSE_PAST_12_MONTHS: NO
AUDIT-C TOTAL SCORE: 0
SUBSTANCE_ABUSE_PAST_12_MONTHS: NO
SKIP TO QUESTIONS 9-10: 1

## 2023-10-02 ASSESSMENT — PAIN SCALES - GENERAL
PAINLEVEL_OUTOF10: 0 - NO PAIN

## 2023-10-02 NOTE — PROGRESS NOTES
Christie Arias is a 68 y.o. 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 Patient does not seem to be good historian. States she woke up this morning feeling short of breath and received breathing treatment at nursing home, which helped a little. Patient was given duonebs by EMS prior to arrival however remained hypoxic.     Of note, patient was recently hospitalized (08/15-) for intermittent chest pain and mucuous plugging. At the time CTA showed near complete collapse of the left lower lobe with opacification in the left lower lobe bronchus. Dr. Greenberg was consulted who advised improvement in mucuous plugging compared to last scan and follow up as outpatient for possible bronchoscopy. Cardiology consulted and recommended patient is not a candidate for invasive procedures and would treat conservatively with Imdur.     Currently, endorses mild SOB, non productive cough lethargy, and cold fingers however denies chest pain, palpitations, nausea/vomiting/hemomptysis, melena/hematochezia, changes to urination or BM.     ROS: 10 point review of systems negative with the exception of above.    ED Course:    Vitals: T36 C     BP 96/64            RR  20     SPO2   100%RA  Labs:  - CBC: WBC 12.1 with neutrophilia, Hgb 6.0, MCV 74  - RFP: unremarkable   - LFTs: unremarkable except , ALT 18, AST 37, T.Bili 0.3  - Blood glucose:140  - PT / INR: 1.2/13.1  - D-dimer   - Lactate: 2.0  - Trop: 2074->1201  - BNP: 885  - VB.30/54/26.6/45.3; suggestive of hypercarbic respiratory acidosis; Venous Hgb 4.7  - Stool Occult blood positive   - UA: small LE/nitrites negative, negative for hematuria or significant proteinuria  - COVID negative   - EKG: Sinus tachycardia with , Qtc 518    Imaging:  -CXR: No acute cardiopulmonary process  -CT  angio PE: No PE; Improved left lower lobe mucous plugging and post obstructive atelectasis since the prior imaging.    Intervention: In ED, patient received 2L NS, IV Vanc/Zosyn, Solumedrol 125mg x1, 2units of pRBC. Patient was placed on BiPAP and was transferred to the floor for further management for suspected upper GIB and acute hypoxic/hyper apneic respiratory failure in the setting of suspected sepsis.    PMH: mentioned above in HPI  PSH: mentioned above in HPI  FH: noncontributory     Social Hx:  - EtOH: Denies  - Tobacco: Denies  - Illicit Drugs: Denies  - Lives at [place]    Subjective          Objective   Constitutional: NAD, cachetic  Resp: dimished BL breath sounds, no wheezing or crackles  CVS: no mgr, RRR  Abd: normal bowel sounds, ND/NT  MSK: no pedal edema  Neuro: AAO x3, non focal neurological exam    Last Recorded Vitals  /86   Pulse (!) 116   Temp 36.7 °C (98.1 °F) (Temporal)   Resp (!) 27   Wt 48.3 kg (106 lb 7.7 oz)   SpO2 100%   Intake/Output last 3 Shifts:    Intake/Output Summary (Last 24 hours) at 10/2/2023 0601  Last data filed at 10/2/2023 0512  Gross per 24 hour   Intake 2122 ml   Output --   Net 2122 ml       Admission Weight  Weight: 52.4 kg (115 lb 8.3 oz) (10/01/23 2306)    Daily Weight  10/02/23 : 48.3 kg (106 lb 7.7 oz)    Image Results  CT angio chest for pulmonary embolism  Narrative: STUDY:  CT Angiogram of the Chest; 10/2/2023 at 2:14 a.m.  INDICATION:  Pulmonary embolism evaluation.  COMPARISON:  One-view CXR 10/1/2023.  CTA CAP 8/15/2023.  ACCESSION NUMBER(S):  [id number]  ORDERING CLINICIAN:  AZUL TERESA  TECHNIQUE:  CTA of the chest was performed with intravenous contrast.   Images are reviewed and processed at a workstation according to the CT  angiogram protocol with 3-D and/or MIP post processing imaging  generated.  Omnipaque 350 100 mL was administered intravenously.  Automated mA/kV exposure control was utilized and patient examination  was performed  in strict accordance with principles of ALARA.  FINDINGS:  Pulmonary arteries are adequately opacified without acute or chronic  filling defects.  Distal thoracic aorta measures 4.3 cm unchanged.  The heart is normal in size without pericardial effusion.  Thoracic  lymph nodes are not enlarged.  There is no pleural effusion, pleural thickening, or pneumothorax.    Improved left lower lobe mucous plugging with residual left basilar  atelectasis.  No evidence of central endobronchial lesion.  Severe  emphysema.  Small amount of right basilar atelectasis.  Lungs are  clear without consolidation, interstitial disease, or suspicious  nodules.  Upper abdomen demonstrates no acute pathology.  There are no acute fractures.  No suspicious bony lesions.  Impression: No acute pulmonary embolism  Distal thoracic aortic aneurysm unchanged measuring 4.3 cm  Improved left lower lobe mucous plugging and postobstructive  atelectasis since the prior.  Signed by Son Singh MD  XR chest 1 view  Narrative: STUDY:  Chest Radiograph;  10/1/2023 11:13PM  INDICATION:  Shortness of breath.  COMPARISON:  XR chest 8/4/2023  ACCESSION NUMBER(S):  [id number]  ORDERING CLINICIAN:  AZUL TERESA  TECHNIQUE:  Frontal chest was obtained at 22:57 hours.  FINDINGS:  CARDIOMEDIASTINAL SILHOUETTE:  Cardiomediastinal silhouette is normal in size and configuration.     LUNGS:  Lungs are clear.  Diffuse chronic parenchymal lung changes are again  apparent.  No large pleural effusion.  No pneumothorax     ABDOMEN:  No remarkable upper abdominal findings.     BONES:  No acute osseous changes.  Impression: No acute pulmonary abnormality.  Signed by Joseph Mcnair MD      Physical Exam  Constitutional: NAD, cachectic appearing  Resp: dimished BL breath sounds, no wheezing   CVS: no mgr, RRR  Abd: normal bowel sounds, ND/NT  MSK: no pedal edema  Neuro: AAO x2, non focal neurological exam    Relevant Results               Assessment/Plan   This patient  currently has cardiac telemetry ordered; if you would like to modify or discontinue the telemetry order, click here to go to the orders activity to modify/discontinue the order.  Christie Arias is a 68 y.o. 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     #Acute hypoxic and hypercapneic respiratory failure likely 2/2 COPD exacerbation  #Sepsis 2/2 unclear source of infection  -Presented with worsening SOB  -Meets ¾ SIRS with tachycardia, tachypnea and leukocytosis  -Possible source of infection: Left lower lobe opacification could likely be developing PNA or bronchial mucous plugging  - Continue Vanc and Zosyn (10/1 - current)  - Pending: procal for abx descalation  -Prednisone 40mg (5days)  - Duonebs PRN, Spiriva, Breo  - Bronchial hygiene (acapella) and incentive spirometry  - Keep SPO2 above 88%  - Saturating on BiPAP; wean as tolerated    #Acute on chronic anemia likely 2/2 JIE, or bone marrow process  - Hgb on admission: 6.0 (baseline 9.0 to 7.8), MCV 74 with FOBT positive   - Iron studies (8/2022): Iron 21, Iron sat 5%, TIBC 388  - Normal BUN:Cr ratio  - s/p 2 units of blood transfusion  - EGD (11/2022) unremarkable; Colonoscopy (11/2022): diverticulosis in sigmoid colon   - IV Protonix 80mg x1   - GI consulted for further eval; appreciate recs    #Non-MI Troponin elevation lilely 2/2 demand ischemia  - EKG on admission: Sinus tachycardia with no ST or T wave morphologies  - Echo (07/2023): Diastolic dysfunction with LVEF 60%  - Trop: 2074->1201  - Pt on telemetry  - Monitor Hgb and transfuse if < 7.0  - Not starting heparin gtt or giving antiplatelets due to possible blood loss anemia    F: s/p 2L NS in ED  E: replete as needed  N: NPO for now  GI ppx: Protonix  DVTppx: none; SCDs    Dispo: Admitted for COPD exacerbation and anemia s/p  transfusion. Est LOS > 48h.                          Yaz Biswas MD

## 2023-10-02 NOTE — CONSULTS
Vancomycin Dosing by Pharmacy- Cessation of Therapy    Consult to pharmacy for vancomycin dosing has been discontinued by the prescriber, pharmacy will sign off at this time.    Please call pharmacy if there are further questions or re-enter a consult if vancomycin is resumed.     Bebeto Gunter, PharmD

## 2023-10-02 NOTE — H&P
Christie Arias is a 68 y.o. 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 Patient does not seem to be good historian. States she woke up this morning feeling short of breath and received breathing treatment at nursing home, which helped a little. Patient was given duonebs by EMS prior to arrival however remained hypoxic.      Of note, patient was recently hospitalized (08/15-) for intermittent chest pain and mucuous plugging. At the time CTA showed near complete collapse of the left lower lobe with opacification in the left lower lobe bronchus. Dr. Greenberg was consulted who advised improvement in mucuous plugging compared to last scan and follow up as outpatient for possible bronchoscopy. Cardiology consulted and recommended patient is not a candidate for invasive procedures and would treat conservatively with Imdur.      Currently, endorses mild SOB, non productive cough lethargy, and cold fingers however denies chest pain, palpitations, nausea/vomiting/hemomptysis, melena/hematochezia, changes to urination or BM.      ROS: 10 point review of systems negative with the exception of above.     ED Course:     Vitals: T36 C     BP 96/64            RR  20     SPO2   100%RA  Labs:  - CBC: WBC 12.1 with neutrophilia, Hgb 6.0, MCV 74  - RFP: unremarkable   - LFTs: unremarkable except , ALT 18, AST 37, T.Bili 0.3  - Blood glucose:140  - PT / INR: 1.2/13.1  - D-dimer   - Lactate: 2.0  - Trop: 2074->1201  - BNP: 885  - VB.30/54/26.6/45.3; suggestive of hypercarbic respiratory acidosis; Venous Hgb 4.7  - Stool Occult blood positive   - UA: small LE/nitrites negative, negative for hematuria or significant proteinuria  - COVID negative   - EKG: Sinus tachycardia with , Qtc 518     Imaging:  -CXR: No acute cardiopulmonary  process  -CT angio PE: No PE; Improved left lower lobe mucous plugging and post obstructive atelectasis since the prior imaging.     Intervention: In ED, patient received 2L NS, IV Vanc/Zosyn, Solumedrol 125mg x1, 2units of pRBC. Patient was placed on BiPAP and was transferred to the floor for further management for suspected upper GIB and acute hypoxic/hyper apneic respiratory failure in the setting of suspected sepsis.     PMH: mentioned above in HPI  PSH: mentioned above in HPI  FH: noncontributory      Social Hx:  - EtOH: Denies  - Tobacco: Denies  - Illicit Drugs: Denies  - Lives at [place]         Physical Exam  Constitutional: NAD, cachetic  Resp: dimished BL breath sounds, no wheezing or crackles  CVS: no mgr, RRR  Abd: normal bowel sounds, ND/NT  MSK: no pedal edema  Neuro: AAO x3, non focal neurological exam     Last Recorded Vitals  /75   Pulse 86   Temp 36.4 °C (97.5 °F) (Temporal)   Resp 18   Wt 48.3 kg (106 lb 7.7 oz)   SpO2 97%     CT angio chest for pulmonary embolism  Narrative: STUDY:  CT Angiogram of the Chest; 10/2/2023 at 2:14 a.m.  INDICATION:  Pulmonary embolism evaluation.  COMPARISON:  One-view CXR 10/1/2023.  CTA CAP 8/15/2023.  ACCESSION NUMBER(S):  [id number]  ORDERING CLINICIAN:  AZUL TERESA  TECHNIQUE:  CTA of the chest was performed with intravenous contrast.   Images are reviewed and processed at a workstation according to the CT  angiogram protocol with 3-D and/or MIP post processing imaging  generated.  Omnipaque 350 100 mL was administered intravenously.  Automated mA/kV exposure control was utilized and patient examination  was performed in strict accordance with principles of ALARA.  FINDINGS:  Pulmonary arteries are adequately opacified without acute or chronic  filling defects.  Distal thoracic aorta measures 4.3 cm unchanged.  The heart is normal in size without pericardial effusion.  Thoracic  lymph nodes are not enlarged.  There is no pleural effusion,  pleural thickening, or pneumothorax.    Improved left lower lobe mucous plugging with residual left basilar  atelectasis.  No evidence of central endobronchial lesion.  Severe  emphysema.  Small amount of right basilar atelectasis.  Lungs are  clear without consolidation, interstitial disease, or suspicious  nodules.  Upper abdomen demonstrates no acute pathology.  There are no acute fractures.  No suspicious bony lesions.  Impression: No acute pulmonary embolism  Distal thoracic aortic aneurysm unchanged measuring 4.3 cm  Improved left lower lobe mucous plugging and postobstructive  atelectasis since the prior.  Signed by Son Singh MD  XR chest 1 view  Narrative: STUDY:  Chest Radiograph;  10/1/2023 11:13PM  INDICATION:  Shortness of breath.  COMPARISON:  XR chest 8/4/2023  ACCESSION NUMBER(S):  [id number]  ORDERING CLINICIAN:  AZUL TERESA  TECHNIQUE:  Frontal chest was obtained at 22:57 hours.  FINDINGS:  CARDIOMEDIASTINAL SILHOUETTE:  Cardiomediastinal silhouette is normal in size and configuration.     LUNGS:  Lungs are clear.  Diffuse chronic parenchymal lung changes are again  apparent.  No large pleural effusion.  No pneumothorax     ABDOMEN:  No remarkable upper abdominal findings.     BONES:  No acute osseous changes.  Impression: No acute pulmonary abnormality.  Signed by Joseph Mcnair MD         Assessment/Plan   Principal Problem:    Respiratory failure (CMS/[facility])    Christie Arias is a 68 y.o. 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      #Acute hypoxic and hypercapneic respiratory failure likely 2/2 COPD exacerbation  #Sepsis 2/2 unclear source of infection  -Presented with worsening SOB  -Meets ¾ SIRS with tachycardia, tachypnea and leukocytosis  -Possible source of infection: Left lower lobe opacification could  likely be developing PNA or bronchial mucous plugging  - Continue Vanc and Zosyn (10/1 - current)  - Pending: procal for abx descalation  -Prednisone 40mg (5days)  - Duonebs PRN, Spiriva, Breo  - Bronchial hygiene (acapella) and incentive spirometry  - Keep SPO2 above 88%  - Saturating on BiPAP; wean as tolerated     #Acute on chronic anemia likely 2/2 JIE or less likely UGIB (PUD, erosive gastritis, esophagitis or AVMs)  - Hgb on admission: 6.0 (baseline 9.0 to 7.8), MCV 74 with FOBT positive   - Iron studies (8/2022): Iron 21, Iron sat 5%, TIBC 388  - Normal BUN:Cr ratio  - s/p 2 units of blood transfusion  - EGD (11/2022) unremarkable; Colonoscopy (11/2022): diverticulosis in sigmoid colon   - IV Protonix 80mg x1   - GI consulted for further eval; appreciate recs     #Non-MI Troponin elevation lilely 2/2 demand ischemia  - EKG on admission: Sinus tachycardia with no ST or T wave morphologies  - Echo (07/2023): Diastolic dysfunction with LVEF 60%  - Trop: 2074->1201  - Pt on telemetry  - Monitor Hgb and transfuse if < 7.0  - Not starting heparin gtt or giving antiplatelets due to possible blood loss anemia     F: s/p 2L NS in ED  E: replete as needed  N: NPO for now  GI ppx: Protonix  DVTppx: none; SCDs     Dispo: Admitted for COPD exacerbation and anemia s/p transfusion. Est LOS > 48h.      Yaz Biswas MD

## 2023-10-02 NOTE — PROGRESS NOTES
"Vancomycin Dosing by Pharmacy- INITIAL    Christie Arias is a 68 y.o. year old female who Pharmacy has been consulted for vancomycin dosing for CNS/meningoencephalitis. Based on the patient's indication and renal status this patient will be dosed based on a goal AUC of 400-600.     Renal function is currently stable.    Visit Vitals  BP 93/72   Pulse 101   Temp 36.1 °C (97 °F) (Temporal)   Resp 20        Lab Results   Component Value Date    CREATININE 0.94 10/02/2023    CREATININE CANCELED 08/17/2023    CREATININE 0.77 08/16/2023    CREATININE 1.00 08/15/2023    CREATININE 0.75 08/05/2023        Patient weight is No results found for: \"PTWEIGHT\"    No results found for: \"CULTURE\"     No intake/output data recorded.  [unfilled]    Lab Results   Component Value Date    PATIENTTEMP 37.0 10/02/2023    PATIENTTEMP 37.0 04/12/2023    PATIENTTEMP 37.0 07/07/2022          Assessment/Plan     Patient will be given a loading dose of 1000 mg.  Will initiate vancomycin maintenance,  500 mg every 12 hours.    This dosing regimen is predicted by [employer] to result in the following pharmacokinetic parameters:    Regimen: 500 mg IV every 12 hours.  Start time: 13:20 on 10/02/2023  Exposure target: AUC24 (range)400-600 mg/L.hr   AUC24,ss: 458 mg/L.hr  Probability of AUC24 > 400: 66 %  Ctrough,ss: 15.4 mg/L  Probability of Ctrough,ss > 20: 25 %  Probability of nephrotoxicity (Lodise JOSÉ MIGUEL 2009): 11 %    Follow-up level will be ordered on 10/3 at 6AM unless clinically indicated sooner.  Will continue to monitor renal function daily while on vancomycin and order serum creatinine at least every 48 hours if not already ordered.  Follow for continued vancomycin needs, clinical response, and signs/symptoms of toxicity.       Jono Williamson, PharmD       "

## 2023-10-02 NOTE — PROGRESS NOTES
10/3/23 @ 1536: 2 liters here, none baseline, not medically ready, spoke with son Jason Arias via phone and confirmed plan is to return to [facility] when medically ready. Referral sent.  Not a precert. Baseline dementia, and history of left sided weakness from Hx CVA.      10/02/23 1406   Discharge Planning   Living Arrangements Other (Comment)   Support Systems Other (Comment)   Assistance Needed LTC at [facility], assist for transfer to wheelchair, room air baseline,   Type of Residence Nursing home/residential care   Care Facility Name [facility] or Post Acute Services Post acute facilities (Rehab/SNF/etc)   Type of Post Acute Facility Services Long term care   Patient expects to be discharged to: [facility], no precert needed   Does the patient need discharge transport arranged? Yes   RoundTrip coordination needed? Yes   Has discharge transport been arranged? No     10/2/23 @ 1418: Call to son to confirm plan is to return to [facility], no answer, no voicemail, await return call.

## 2023-10-02 NOTE — CONSULTS
Consults    Reason For Consult  Anemia requiring blood transfusion    History Of Present Illness  Christie Arias is a 68 y.o. female presenting with shortness of breath initially requiring BiPAP now on room air.  She has a history of dementia, malnutrition, COPD, CVA, thoracic aorta aneurysm.  Presented with shortness of breath.  Today she reports she feels much better.  She denies any abdominal pain, nausea, vomiting, fever, chills, melena, BRBPR, unintentional weight loss, GERD, dysphagia.  She did undergo EGD and colonoscopy in November 2022 which noted normal stomach, esophagus, duodenum.  Colonoscopy noted diverticulosis.  Biopsies were unremarkable.  Labs remarkable for hemoglobin 6.0 upon admission status post 2 units PRBC and has incremented to 7.2.  No BUN/creatinine ratio elevation.  CTA of the chest noted no pulmonary embolism. CT CAP in July 2023 noted no acute GI pathology.  Iron studies note low iron, percent saturation, normal TIBC.  Per nursing, there have been no bowel movements since admission or signs of GI bleeding. Patient reports she feels hungry and would like to eat.      Past Medical History  She has a past medical history of Personal history of other diseases of the digestive system.    Surgical History  She has a past surgical history that includes Other surgical history (07/22/2022); Other surgical history (07/22/2022); Other surgical history (07/22/2022); and CT angio abdomen pelvis w and or wo IV IV contrast (4/12/2023).     Social History  She reports that she has never smoked. She has never used smokeless tobacco. No history on file for alcohol use and drug use.    Family History  No family history on file.     Allergies  Codeine    Review of Systems    I performed a complete 10 point review of systems and it is negative except as noted in HPI.  All other systems have been reviewed and are negative.    Physical Exam   /75   Pulse 86   Temp 36.4 °C (97.5 °F) (Temporal)    "Resp 18   Ht 1.753 m (5' 9\")   Wt 48.3 kg (106 lb 7.7 oz)   SpO2 93%   BMI 15.72 kg/m²   Constitutional: frail, cachectic appearing. NAD. Alert and cooperative  Skin: no jaundice   Eyes: anicteric, normal conjunctiva  ENT: MMM  Pulmonary: easy and nonlabored on RA  Abdomen: soft, NT, ND. No ascites.  MSK: MAEx4  Extremities: no edema  Neuro: aaox3. No asterixis.   Psych: appropriate mood and behavior    Last Recorded Vitals  Blood pressure 101/75, pulse 86, temperature 36.4 °C (97.5 °F), temperature source Temporal, resp. rate 18, height 1.753 m (5' 9\"), weight 48.3 kg (106 lb 7.7 oz), SpO2 93 %.    Relevant Results  @  Lab Results   Component Value Date    WBC 7.5 10/02/2023    HGB 7.2 (L) 10/02/2023    HCT 24.2 (L) 10/02/2023    MCV 77 (L) 10/02/2023     10/02/2023      Lab Results   Component Value Date    GLUCOSE 156 (H) 10/02/2023    CALCIUM 8.5 (L) 10/02/2023     10/02/2023    K 3.4 (L) 10/02/2023    CO2 22 10/02/2023     10/02/2023    BUN 16 10/02/2023    CREATININE 0.82 10/02/2023      Lab Results   Component Value Date    ALT 18 10/02/2023    AST 27 10/02/2023    ALKPHOS 121 10/02/2023    BILITOT 0.5 10/02/2023    === 10/01/23 ===    CT ANGIO CHEST FOR PULMONARY EMBOLISM    - Impression -  No acute pulmonary embolism  Distal thoracic aortic aneurysm unchanged measuring 4.3 cm  Improved left lower lobe mucous plugging and postobstructive  atelectasis since the prior.  Signed by Son Singh MD === 10/01/23 ===    XR CHEST 1 VIEW    - Impression -  No acute pulmonary abnormality.  Signed by Joseph Mcnair MD      Assessment/Plan     68-year-old female admitted for COPD exacerbation.  Found to have acute on chronic anemia requiring blood transfusion. Hemoglobin 7.2 status post 2 units PRBCs.  Low suspicion for active GI bleeding, possible occult losses from gastritis, PUD although did have negative EGD and colonoscopy in November 2022.  Would suspect anemia is multifactorial r/t bone " marrow suppression, anemia of chronic disease, iron malabsorption.    -No plan for endoscopy at this time given negative scopes less than 1 year ago, no signs of active GI bleeding  -PPI daily  -Regular diet  -CBC, BMP daily  -Agree with dietitian consult  -Would recommend IV iron today instead of oral iron  -Trend CBC and transfuse hemoglobin less than 7  -Supportive care per primary    Thank you for the consult. GI will sign off Please dochalo with any questions.    Plan discussed with Dr. Scott, who is in agreement.     Linh Daniels PA-C     I spent 60 minutes in the professional and overall care of this patient.

## 2023-10-02 NOTE — CARE PLAN
The patient's goals for the shift include  SOB    The clinical goals for the shift include decrease oxygen demand, blood administration    Over the shift, the patient did not make progress toward the following goals. Barriers to progression include lack of motivation. Recommendations to address these barriers include encouragement to participate in care.

## 2023-10-02 NOTE — NURSING NOTE
Team notified that pt is more tachycardic than this morning, HR is 115 bpm, /74, Dr. Sandoval to bedside. No new orders at this time.

## 2023-10-02 NOTE — HOSPITAL COURSE
Christie Arias is a 68 y.o. 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 On presentation, T 38, BP 96/64, , RR 20, pOx 100% on non-rebreather. Labs notable for WBC 12.1 with left shift, D-dimer 1950, lactate 2.0, troponin 2074 -> 1201, , VBG showing hypercarbic respiratory acidosis. EKG notable for prolonged Qtc 518. CXR showed no acute process, CT angio PE showed no PE, improved LL lobe mucous plugging and post-obstructive atelectasis since the prior imaging. Patient received 2L NS, IV Vanc/Zosyn, Solumedrol 125mg x1, 2units of pRBC. Patient was placed on BiPAP and was transferred to the floor for further management for suspected upper GIB and acute hypoxic/hyper apneic respiratory failure in the setting of suspected sepsis. On the floor, weaned to NC and started on prednisone. Antibiotics discontinued given low concern for infection. GI consulted and saw no signs of GI bleed. Pt started on iron supplementation given labs showing iron deficiency.    On 10/3, notified of 1/2 sets of blood cx from admission positive for staph epidermidis. ID consulted, started on vancomycin (10/3), discontinued on 10/5 after repeat blood cultures negative and CRP/ESR WNL. Given persistent episodes of oxygen desaturation despite treating for COPD exacerbation, pulmonology consulted. Recommended continuing prednisone course. Patient was found to have continuous diffuse consolidation of the left lung, so she underwent a bronchoscopy which showed significant improvement of respiratory status. Repeat CXR after bronchoscopy showed left sided pleural effusion, so she underwent a thoracentesis which drained 950 ccs of yellow fluid. After thora, patient had significant improvement of respiratory status. 2 nights prior to discharge, the patient had an  episode of worsening pulmonary edema, was given a dose of IV Lasix 20 with improvement of symptoms. Pulmonology signed off due to improvement of respiratory status, recommending a steroid taper. Discussed with ID, no need for outpatient antibiotics as the patient completed 6 days of Zosyn. She was instructed to continue her home medications as prescribed, and will remain stable on 2L O2 continuously.

## 2023-10-02 NOTE — SIGNIFICANT EVENT
Updated Assessment & Plan    Christie Arias is a 68 y.o. 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      #Acute hypoxic and hypercapneic respiratory failure likely 2/2 COPD exacerbation  Unlikely infection given no fever and interval improvement of LLL opacification/mucous plugging on CT PE  - discontinue Vanc and Zosyn (10/1 - 10/2)  - S/p solumedrol in ED  - Start Prednisone 40mg (5days) (10/2 - current)  - Duonebs PRN, Spiriva, Breo  - Bronchial hygiene (acapella) and incentive spirometry  - Keep SPO2 above 88%  - Weaned from BiPap to 2L NC, now weaned to RA     #Acute on chronic anemia likely 2/2 JIE  Less likely UGIB given no clinical signs or MDS given only RBCs affected  - Hgb on admission: 6.0 (baseline 9.0 to 7.8), MCV 74 with FOBT positive   - Iron studies (8/2022): Iron 21, Iron sat 5%, TIBC 388  - EGD (11/2022) unremarkable; Colonoscopy (11/2022): diverticulosis in sigmoid colon   - Normal BUN:Cr ratio  - s/p 2 units of blood transfusion  - IV Protonix 80mg x1 -> IV protonix 40mg daily  - Repeat iron studies (10/2/2022): iron 18, % sat 4, TIBC 418, ferritin 19  - Start daily iron supplementation     #Non-MI Troponin elevation likely 2/2 demand ischemia  - EKG on admission: Sinus tachycardia with no ST or T wave morphologies  - Echo (07/2023): Diastolic dysfunction with LVEF 60%  - Trop: 2074->1201  - Pt on telemetry  - Monitor Hgb and transfuse if < 7.0  - Not starting heparin gtt or giving antiplatelets due to possible blood loss anemia     #Failure to thrive  - consult nutrition    Chronic medical conditions  #HTN: hold home amlodipine 10mg given soft BPs  #MDD: cont zoloft and remeron    F: s/p 2L NS in ED  E: replete as needed  N: NPO for now  GI ppx: Protonix  DVTppx: none; SCDs     Dispo: Admitted for COPD exacerbation and  anemia s/p transfusion. Est LOS > 48h.

## 2023-10-02 NOTE — ED PROVIDER NOTES
HPI   Chief Complaint   Patient presents with    Shortness of Breath     Pt came from [place] and was not doing well on her 02       Limitations to history: none  External Records Reviewed      HPI:   The patient is a 68-year-old female past medical history of COPD and pneumonia arriving in respiratory distress.  Patient comes from nursing home where she today states she is short of breath coughing wheezing.  Cough been nonproductive.  She is a history of reported mucous plug which she states she may need surgery for.  EMS also provide some HPI given patient's acuity of her condition.  She was immediately seen upon arrival here.  She was hypoxic after still receiving DuoNebs 2 of them from EMS.  She was then started on BiPAP.  HPI limited given patient's acuity of her condition.       ROS: All other review of systems are negative except as noted above and HPI or ROS.   CONSTITUTIONAL:       fever, chills  ENT:       sore throat, congestion, rhinorrhea  CARDIOVASCULAR:       +chest pain, palpitations, swelling  RESPIRATORY:       +cough, +wheeze, +shortness of breath  GI:       nausea, vomiting, diarrhea, abdominal pain  GENITOURINARY:       dysuria, hematuria, frequency  MUSCULOSKELETAL:       deformity, neck pain  SKIN:       rash, lesion  NEUROLOGIC:       headache, numbness, focal weakness  NOTES: All systems reviewed, negative except as described above       Physical Exam:  GENERAL: Alert, oriented , cooperative    HEAD: normocephalic, atraumatic    SKIN:  dry skin, no lesions.    EYES: PERRL, EOMs intact,  Conjunctiva pink with no erythema or exudates. No scleral icterus.     ENT: No external deformities. Nares patent, mucus membranes moist.  Pharynx clear, uvula midline.     NECK: Supple, without meningismus. Trachea at midline. No lymphadenopathy.    PULMONARY:no crackles, rhonchi.  Tachypnea with scattered wheezing worse on the right side.    CARDIAC: Regular rate and regular rhythm.  Pulses 2+ in radials  and dorsal pedal pulses bilaterally.  No murmur, rub, gallop.  Edema in left foot    ABDOMEN: Soft, nontender, active bowel sounds.  No palpable organomegaly.  No rebound or guarding.  No CVA tenderness.  No pulsatile masses.    : Exam deferred.    MUSCULOSKELETAL: Full range of motion throughout, no deformity.     NEUROLOGICAL:  CN II through XII are grossly intact, no focal neuro deficits.  Strength 5 out of 5 throughout bilateral upper and lower extremities neurovascular intact in bilateral upper and lower extremities    PSYCHIATRIC: Appropriate mood and affect. Calm.       MDM/ED COURSE:      The patient presented for evaluation of shortness of breath.  Differential includes but not limited to COPD, CHF, Pneumonia, viral illness.  Imaging studies if performed were independently reviewed and interpreted by myself and confirmed by radiologist.      Patient requires continued workup and management of their symptoms and will be admitted to the hospital for further evaluation and treatment.           I discussed the differential, results and plan with the patient and/or family/friend/caregiver if present. All questions answered.         Note: This note was dictated by speech recognition. Minor errors in transcription may be present.                    No data recorded                Patient History   Past Medical History:   Diagnosis Date    Personal history of other diseases of the digestive system     History of diverticulitis of colon     Past Surgical History:   Procedure Laterality Date    CT ABDOMEN PELVIS ANGIOGRAM W AND/OR WO IV CONTRAST  4/12/2023    CT ABDOMEN PELVIS ANGIOGRAM W AND/OR WO IV CONTRAST [facility] CT    OTHER SURGICAL HISTORY  07/22/2022    Colectomy    OTHER SURGICAL HISTORY  07/22/2022    Hysterectomy    OTHER SURGICAL HISTORY  07/22/2022    Femorofemoral bypass     No family history on file.  Social History     Tobacco Use    Smoking status: Never    Smokeless tobacco: Never   Substance Use  Topics    Alcohol use: Not on file    Drug use: Not on file       Physical Exam   ED Triage Vitals   Temp Pulse Resp BP   -- -- -- --      SpO2 Temp src Heart Rate Source Patient Position   -- -- -- --      BP Location FiO2 (%)     -- --           ED Course & [facility]   ED Course as of 10/09/23 2001   Mon Oct 02, 2023   0134 Patient found to be anemic here.  She is placed on BiPAP given her hypoxia hypercapnia and acidosis.  Hemoglobin found to be 6.  She was a sepsis alert started on broad-spectrum antibiotics and blood cultures performed.  She has a POA as she has a history of dementia and I tried reaching out to them but their mailbox is full and I did not receive an answer back.  I told the patient at bedside and she did give consent for the blood as her hemoglobin is 6.  We will transfuse her here. [WL]   0139 EKG interpreted by me shows sinus tachycardia.  Positive atrial margin.  Nonspecific ST abnormalities.  No STEMI.  ST depressions in lateral leads.  Rate 102, , QRS 90 QTc 518.  Artifact present.  No prior EKG for comparison [WL]   0353 occult was positive.  Patient suffering from GI bleed.  Consult was made to hospitalist service I spoke with Ene who agreed on admission. [WL]   0354 CT angio showed no PE but unchanged thoracic aortic aneurysm along with improved left lower lobe mucous plugging. [WL]   0354 Elevated troponin may be from the anemia but will hold off on giving any heparin for NSTEMI given the concern for bleed. [WL]   0354 I discussed this with hospitalist service who agrees on plan.  Patient reevaluated at bedside and states she feels better on the BiPAP. [WL]   0416 Reevaluation of sepsis bolus of fluid patient cap refill within normal limits and blood pressure has been within normal limits MAP.   [WL]   0417 she was found to have bacteria along with leukocytosis positive consistent with UTI but already given antibiotics for coverage. [WL]      ED Course User Index  [WL] Osmel Bettencourt,  DO         Diagnoses as of 10/09/23 2001   Respiratory failure (CMS/[facility])   COPD exacerbation (CMS/[facility])   Pneumonia due to infectious organism, unspecified laterality, unspecified part of lung   Sepsis, due to unspecified organism, unspecified whether acute organ dysfunction present (CMS/[facility])           Procedure  Procedures     Osmel Bettencourt DO  10/09/23 2001

## 2023-10-03 LAB
ALBUMIN SERPL BCP-MCNC: 3.6 G/DL (ref 3.4–5)
ANION GAP SERPL CALC-SCNC: 14 MMOL/L (ref 10–20)
BUN SERPL-MCNC: 19 MG/DL (ref 6–23)
CALCIUM SERPL-MCNC: 8.6 MG/DL (ref 8.6–10.3)
CHLORIDE SERPL-SCNC: 105 MMOL/L (ref 98–107)
CO2 SERPL-SCNC: 23 MMOL/L (ref 21–32)
CREAT SERPL-MCNC: 0.98 MG/DL (ref 0.5–1.05)
CRP SERPL-MCNC: 0.84 MG/DL
ERYTHROCYTE [DISTWIDTH] IN BLOOD BY AUTOMATED COUNT: 18 % (ref 11.5–14.5)
ERYTHROCYTE [SEDIMENTATION RATE] IN BLOOD BY WESTERGREN METHOD: 9 MM/H (ref 0–30)
GFR SERPL CREATININE-BSD FRML MDRD: 62 ML/MIN/1.73M*2
GLUCOSE SERPL-MCNC: 109 MG/DL (ref 74–99)
HCT VFR BLD AUTO: 26.1 % (ref 36–46)
HGB BLD-MCNC: 8.3 G/DL (ref 12–16)
MAGNESIUM SERPL-MCNC: 1.88 MG/DL (ref 1.6–2.4)
MCH RBC QN AUTO: 24.2 PG (ref 26–34)
MCHC RBC AUTO-ENTMCNC: 31.8 G/DL (ref 32–36)
MCV RBC AUTO: 76 FL (ref 80–100)
NRBC BLD-RTO: 0 /100 WBCS (ref 0–0)
PHOSPHATE SERPL-MCNC: 3.5 MG/DL (ref 2.5–4.9)
PLATELET # BLD AUTO: 370 X10*3/UL (ref 150–450)
PMV BLD AUTO: 11.5 FL (ref 7.5–11.5)
POTASSIUM SERPL-SCNC: 3.7 MMOL/L (ref 3.5–5.3)
RBC # BLD AUTO: 3.43 X10*6/UL (ref 4–5.2)
SODIUM SERPL-SCNC: 138 MMOL/L (ref 136–145)
WBC # BLD AUTO: 14.1 X10*3/UL (ref 4.4–11.3)

## 2023-10-03 PROCEDURE — 36415 COLL VENOUS BLD VENIPUNCTURE: CPT

## 2023-10-03 PROCEDURE — 94640 AIRWAY INHALATION TREATMENT: CPT

## 2023-10-03 PROCEDURE — 2500000001 HC RX 250 WO HCPCS SELF ADMINISTERED DRUGS (ALT 637 FOR MEDICARE OP)

## 2023-10-03 PROCEDURE — 2060000001 HC INTERMEDIATE ICU ROOM DAILY

## 2023-10-03 PROCEDURE — 97165 OT EVAL LOW COMPLEX 30 MIN: CPT | Mod: GO

## 2023-10-03 PROCEDURE — 87040 BLOOD CULTURE FOR BACTERIA: CPT | Mod: CMCLAB,GEALAB

## 2023-10-03 PROCEDURE — 83735 ASSAY OF MAGNESIUM: CPT

## 2023-10-03 PROCEDURE — 86140 C-REACTIVE PROTEIN: CPT

## 2023-10-03 PROCEDURE — 2500000004 HC RX 250 GENERAL PHARMACY W/ HCPCS (ALT 636 FOR OP/ED)

## 2023-10-03 PROCEDURE — 99232 SBSQ HOSP IP/OBS MODERATE 35: CPT

## 2023-10-03 PROCEDURE — C9113 INJ PANTOPRAZOLE SODIUM, VIA: HCPCS

## 2023-10-03 PROCEDURE — 2500000002 HC RX 250 W HCPCS SELF ADMINISTERED DRUGS (ALT 637 FOR MEDICARE OP, ALT 636 FOR OP/ED)

## 2023-10-03 PROCEDURE — 85027 COMPLETE CBC AUTOMATED: CPT

## 2023-10-03 PROCEDURE — 2500000005 HC RX 250 GENERAL PHARMACY W/O HCPCS: Performed by: INTERNAL MEDICINE

## 2023-10-03 PROCEDURE — 2500000002 HC RX 250 W HCPCS SELF ADMINISTERED DRUGS (ALT 637 FOR MEDICARE OP, ALT 636 FOR OP/ED): Performed by: INTERNAL MEDICINE

## 2023-10-03 PROCEDURE — 85652 RBC SED RATE AUTOMATED: CPT

## 2023-10-03 PROCEDURE — 97162 PT EVAL MOD COMPLEX 30 MIN: CPT | Mod: GP

## 2023-10-03 PROCEDURE — 80069 RENAL FUNCTION PANEL: CPT

## 2023-10-03 RX ORDER — ENOXAPARIN SODIUM 100 MG/ML
30 INJECTION SUBCUTANEOUS DAILY
Status: DISCONTINUED | OUTPATIENT
Start: 2023-10-03 | End: 2023-10-03

## 2023-10-03 RX ORDER — VANCOMYCIN HYDROCHLORIDE 1 G/200ML
1000 INJECTION, SOLUTION INTRAVENOUS EVERY 24 HOURS
Status: DISCONTINUED | OUTPATIENT
Start: 2023-10-03 | End: 2023-10-05

## 2023-10-03 RX ORDER — ENOXAPARIN SODIUM 100 MG/ML
30 INJECTION SUBCUTANEOUS EVERY 24 HOURS
Status: DISCONTINUED | OUTPATIENT
Start: 2023-10-04 | End: 2023-10-12 | Stop reason: HOSPADM

## 2023-10-03 RX ORDER — ENOXAPARIN SODIUM 100 MG/ML
1 INJECTION SUBCUTANEOUS EVERY 12 HOURS
Status: DISCONTINUED | OUTPATIENT
Start: 2023-10-03 | End: 2023-10-03

## 2023-10-03 RX ORDER — IPRATROPIUM BROMIDE AND ALBUTEROL SULFATE 2.5; .5 MG/3ML; MG/3ML
3 SOLUTION RESPIRATORY (INHALATION)
Status: DISCONTINUED | OUTPATIENT
Start: 2023-10-03 | End: 2023-10-04

## 2023-10-03 RX ADMIN — PANTOPRAZOLE SODIUM 40 MG: 40 INJECTION, POWDER, FOR SOLUTION INTRAVENOUS at 09:07

## 2023-10-03 RX ADMIN — ENOXAPARIN SODIUM 30 MG: 30 INJECTION SUBCUTANEOUS at 13:48

## 2023-10-03 RX ADMIN — PREDNISONE 40 MG: 20 TABLET ORAL at 09:07

## 2023-10-03 RX ADMIN — FLUTICASONE FUROATE AND VILANTEROL TRIFENATATE 1 PUFF: 100; 25 POWDER RESPIRATORY (INHALATION) at 09:09

## 2023-10-03 RX ADMIN — Medication 5 MG: at 20:49

## 2023-10-03 RX ADMIN — Medication 1 L/MIN: at 15:48

## 2023-10-03 RX ADMIN — IPRATROPIUM BROMIDE AND ALBUTEROL SULFATE 3 ML: .5; 3 SOLUTION RESPIRATORY (INHALATION) at 08:37

## 2023-10-03 RX ADMIN — IPRATROPIUM BROMIDE AND ALBUTEROL SULFATE 3 ML: .5; 3 SOLUTION RESPIRATORY (INHALATION) at 15:46

## 2023-10-03 RX ADMIN — ATORVASTATIN CALCIUM 80 MG: 80 TABLET, FILM COATED ORAL at 20:48

## 2023-10-03 RX ADMIN — IPRATROPIUM BROMIDE AND ALBUTEROL SULFATE 3 ML: .5; 3 SOLUTION RESPIRATORY (INHALATION) at 13:13

## 2023-10-03 RX ADMIN — SERTRALINE HYDROCHLORIDE 25 MG: 50 TABLET ORAL at 09:08

## 2023-10-03 RX ADMIN — Medication 4 L/MIN: at 08:00

## 2023-10-03 RX ADMIN — TIOTROPIUM BROMIDE INHALATION SPRAY 2 PUFF: 3.12 SPRAY, METERED RESPIRATORY (INHALATION) at 09:11

## 2023-10-03 RX ADMIN — VANCOMYCIN HYDROCHLORIDE 1000 MG: 1 INJECTION, SOLUTION INTRAVENOUS at 13:51

## 2023-10-03 RX ADMIN — MIRTAZAPINE 15 MG: 15 TABLET, FILM COATED ORAL at 20:49

## 2023-10-03 ASSESSMENT — COGNITIVE AND FUNCTIONAL STATUS - GENERAL
PERSONAL GROOMING: A LITTLE
STANDING UP FROM CHAIR USING ARMS: A LITTLE
MOBILITY SCORE: 16
TURNING FROM BACK TO SIDE WHILE IN FLAT BAD: A LITTLE
MOBILITY SCORE: 11
DRESSING REGULAR LOWER BODY CLOTHING: TOTAL
STANDING UP FROM CHAIR USING ARMS: A LOT
MOVING TO AND FROM BED TO CHAIR: A LITTLE
DAILY ACTIVITIY SCORE: 14
WALKING IN HOSPITAL ROOM: A LOT
DRESSING REGULAR UPPER BODY CLOTHING: A LITTLE
TURNING FROM BACK TO SIDE WHILE IN FLAT BAD: A LOT
WALKING IN HOSPITAL ROOM: TOTAL
DAILY ACTIVITIY SCORE: 15
EATING MEALS: A LITTLE
DRESSING REGULAR LOWER BODY CLOTHING: A LOT
MOVING FROM LYING ON BACK TO SITTING ON SIDE OF FLAT BED WITH BEDRAILS: A LITTLE
MOVING TO AND FROM BED TO CHAIR: A LOT
CLIMB 3 TO 5 STEPS WITH RAILING: A LOT
MOVING FROM LYING ON BACK TO SITTING ON SIDE OF FLAT BED WITH BEDRAILS: A LITTLE
PERSONAL GROOMING: A LITTLE
HELP NEEDED FOR BATHING: A LOT
DRESSING REGULAR UPPER BODY CLOTHING: A LITTLE
CLIMB 3 TO 5 STEPS WITH RAILING: TOTAL
TOILETING: A LOT
HELP NEEDED FOR BATHING: A LOT
TOILETING: TOTAL

## 2023-10-03 ASSESSMENT — PAIN SCALES - GENERAL
PAINLEVEL_OUTOF10: 0 - NO PAIN
PAINLEVEL_OUTOF10: 0 - NO PAIN

## 2023-10-03 ASSESSMENT — ACTIVITIES OF DAILY LIVING (ADL)
ADL_ASSISTANCE: NEEDS ASSISTANCE
ADL_ASSISTANCE: NEEDS ASSISTANCE

## 2023-10-03 ASSESSMENT — PAIN - FUNCTIONAL ASSESSMENT
PAIN_FUNCTIONAL_ASSESSMENT: 0-10

## 2023-10-03 NOTE — CARE PLAN
Problem: Balance  Goal: STG - Maintains dynamic sitting balance with upper extremity support x 10' supervision   Description: INTERVENTIONS:  1. Practice sitting on the edge of a bed/mat with minimal support.  2. Educate patient about maintining total hip precautions while maintaining balance.  3. Educate patient about pressure relief.  4. Educate patient about use of assistive device.  Outcome: Progressing     Problem: Transfers  Goal: STG - Patient will perform bed mobility CGA   Outcome: Progressing  Goal: STG - Patient will stand pivot and sit<>stand transfers min A   Outcome: Progressing

## 2023-10-03 NOTE — PROGRESS NOTES
"Vancomycin Dosing by Pharmacy- INITIAL    Christie Arias is a 68 y.o. year old female who Pharmacy has been consulted for vancomycin dosing for pneumonia. Based on the patient's indication and renal status this patient will be dosed based on a goal AUC of 400-600.     Renal function is currently stable.    Visit Vitals  /74 (BP Location: Right arm, Patient Position: Lying)   Pulse 99   Temp 36.6 °C (97.9 °F) (Temporal)   Resp 20        Lab Results   Component Value Date    CREATININE 0.98 10/03/2023    CREATININE 0.82 10/02/2023    CREATININE 0.94 10/02/2023    CREATININE CANCELED 08/17/2023    CREATININE 0.77 08/16/2023    CREATININE 1.00 08/15/2023    CREATININE 0.75 08/05/2023        Patient weight is No results found for: \"PTWEIGHT\"    No results found for: \"CULTURE\"     I/O last 3 completed shifts:  In: 3424.9 (70.9 mL/kg) [P.O.:240; Blood:1062.9; IV Piggyback:2122]  Out: 850 (17.6 mL/kg) [Urine:850 (0.5 mL/kg/hr)]  Weight: 48.3 kg   [unfilled]    Lab Results   Component Value Date    PATIENTTEMP 37.0 10/02/2023    PATIENTTEMP 37.0 10/02/2023    PATIENTTEMP 37.0 04/12/2023    PATIENTTEMP 37.0 07/07/2022          Assessment/Plan     Patient will not be given a loading dose.  Will initiate vancomycin maintenance,  1000 mg every 24 hours.    This dosing regimen is predicted by [employer] to result in the following pharmacokinetic parameters:    Regimen: 1000 mg IV every 24 hours.  Start time: 12:20 on 10/03/2023  Exposure target: AUC24 (range)400-600 mg/L.hr   AUC24,ss: 490 mg/L.hr  Probability of AUC24 > 400: 74 %  Ctrough,ss: 14.4 mg/L  Probability of Ctrough,ss > 20: 20 %  Probability of nephrotoxicity (Lodise JOSÉ MIGUEL 2009): 10 %      Follow-up level will be ordered on 10/4/23 at 0600 unless clinically indicated sooner.  Will continue to monitor renal function daily while on vancomycin and order serum creatinine at least every 48 hours if not already ordered.  Follow for continued vancomycin needs, " clinical response, and signs/symptoms of toxicity.     Florentin Schultz, PharmD, BCPS, BCCCP  Clinical Pharmacy Specialist- Internal Medicine  Available via EPIC Secure Chat  Phone: [phone]

## 2023-10-03 NOTE — PROGRESS NOTES
Physical Therapy    Physical Therapy Evaluation    Patient Name: Christie Arias  MRN: [id number]  Today's Date: 10/3/2023   Time Calculation  Start Time: 1241  Stop Time: 1257  Time Calculation (min): 16 min    Assessment/Plan   PT Assessment  PT Assessment Results: Decreased strength, Decreased endurance, Impaired balance  Rehab Prognosis: Fair  Barriers to Discharge: Cognition, anxious behaviors  Evaluation/Treatment Tolerance: Patient limited by fatigue (Patient becomes anxious easily, requires calming techniques)  Medical Staff Made Aware: Yes  Strengths: Housing layout, Support of Caregivers  End of Session Communication: Bedside nurse  End of Session Patient Position: Bed, 3 rail up, Alarm on  IP OR SWING BED PT PLAN  Inpatient or Swing Bed: Inpatient  PT Plan  Treatment/Interventions: Bed mobility, Transfer training, Balance training  PT Plan: Skilled PT  PT Frequency: 3 times per week  Equipment Recommended upon Discharge: Wheeled walker  PT Recommended Transfer Status: Assist x2      Subjective   General Visit Information:  General  Reason for Referral: Impaired mobility  Referred By: Gold Avila  Past Medical History Relevant to Rehab: dementia, malnutrition, COPD, 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, stable thoracic aorta aneurysm presented for worsening shortness of breath  Family/Caregiver Present: No  Prior to Session Communication: Bedside nurse  Patient Position Received: Bed, 3 rail up  Preferred Learning Style: verbal (Patient with h/o dementia, requires review)  Home Living:  Home Living  Type of Home: Long Term Care facility  Home Adaptive Equipment: Wheelchair-manual  Home Layout: One level  Home Access: No concerns  Prior Level of Function:  Prior Function Per Pt/Caregiver Report  Level of Delta: Needs assistance with ADLs  Receives Help From:  (Staff at facility)  ADL Assistance: Needs assistance  Homemaking Assistance: Needs assistance  Prior Function Comments:  Patient completes stand pivot transfers only with assist x 1, wheelchair mobile, able to self propel  Precautions:  Precautions  Medical Precautions: Fall precautions, Oxygen therapy device and L/min (4L O2, IV, purewick)  Vital Signs:  Vital Signs  Heart Rate:  (130-150 throughout tx)  Heart Rate Source: Monitor  SpO2:  (86-92%)    Objective   Pain:  Pain Assessment  Pain Assessment: 0-10  Pain Score: 0 - No pain  Cognition:  Cognition  Overall Cognitive Status:  (AO x 3, unable to accurately state date; decreased recall)    General Assessments:            Strength  Strength Comments: Grossly 3/5 B LE                Static Sitting Balance  Static Sitting-Balance Support: Bilateral upper extremity supported  Static Sitting-Level of Assistance: Contact guard, Minimum assistance       Functional Assessments:       Bed Mobility  Bed Mobility: Yes  Bed Mobility 1  Bed Mobility 1: Supine to sitting  Level of Assistance 1: Maximum assistance  Bed Mobility 2  Bed Mobility  2: Sitting to supine  Level of Assistance 2: Maximum assistance    Transfers  Transfer: Yes  Transfer 1  Transfer From 1: Sit to  Transfer to 1: Stand  Technique 1: Sit to stand  Transfer Device 1:  (Therapist in front of patient)  Transfer Level of Assistance 1: Maximum assistance  Transfers 2  Transfer From 2: Stand to  Transfer to 2: Sit  Technique 2: Stand to sit  Transfer Level of Assistance 2: Maximum assistance    Outcome Measures:  [facility] Basic Mobility  Turning from your back to your side while in a flat bed without using bedrails: A little  Moving from lying on your back to sitting on the side of a flat bed without using bedrails: A lot  Moving to and from bed to chair (including a wheelchair): A lot  Standing up from a chair using your arms (e.g. wheelchair or bedside chair): A lot  To walk in hospital room: Total  Climbing 3-5 steps with railing: Total  Basic Mobility - Total Score: 11    Encounter Problems       Encounter Problems (Active)        Balance       STG - Maintains dynamic sitting balance with upper extremity support x 10' supervision  (Progressing)       Start:  10/03/23    Expected End:  10/17/23       INTERVENTIONS:  1. Practice sitting on the edge of a bed/mat with minimal support.  2. Educate patient about maintining total hip precautions while maintaining balance.  3. Educate patient about pressure relief.  4. Educate patient about use of assistive device.            Transfers       STG - Patient will perform bed mobility CGA  (Progressing)       Start:  10/03/23    Expected End:  10/17/23            STG - Patient will stand pivot and sit<>stand transfers min A  (Progressing)       Start:  10/03/23    Expected End:  10/17/23                   Education Documentation  Precautions, taught by Karoline Conde PT at 10/3/2023  1:14 PM.  Learner: Patient  Readiness: Acceptance  Method: Explanation  Response: Needs Reinforcement  Comment: Patient educated on role of therapy services, importance of mobility and POC    Mobility Training, taught by Karoline Conde PT at 10/3/2023  1:14 PM.  Learner: Patient  Readiness: Acceptance  Method: Explanation  Response: Needs Reinforcement  Comment: Patient educated on role of therapy services, importance of mobility and POC    Education Comments  No comments found.

## 2023-10-03 NOTE — CONSULTS
Consults  Referred by Aisha Avila MD: Violeta Walton MD    Reason For Consult  Bacteremia    History Of Present Illness  Christie Arias is a 68 y.o. female with hx of dementia, hx of COPD, hx of CVA, she was admitted for increasing sob, her xrays showed Lt base changes, the WBC were slightly up, the BC with 1 set positive for g+ve cocci, the hx is limited, has no fever, she has dry cough, intermittent wheezing, no chest pain, no emesis, no diarrhea, no dysuria     Past Medical History  She has a past medical history of Personal history of other diseases of the digestive system.    Surgical History  She has a past surgical history that includes Other surgical history (07/22/2022); Other surgical history (07/22/2022); Other surgical history (07/22/2022); and CT angio abdomen pelvis w and or wo IV IV contrast (4/12/2023).     Social History     Occupational History    Not on file   Tobacco Use    Smoking status: Never    Smokeless tobacco: Never   Substance and Sexual Activity    Alcohol use: Not on file    Drug use: Not on file    Sexual activity: Not on file     Travel History   Travel since 09/03/23    No documented travel since 09/03/23            Family History  No family history on file.  Allergies  Codeine       There is no immunization history on file for this patient.  Pneumonia and influenza vaccine are up to date  Ingram fall risk 75, preventive protocol was implemented  Depression screen is negative  Medications  Home medications:  Medications Prior to Admission   Medication Sig Dispense Refill Last Dose    aspirin 81 mg EC tablet Take 1 tablet (81 mg) by mouth twice a day.       atorvastatin (Lipitor) 80 mg tablet Take 1 tablet (80 mg) by mouth once daily at bedtime.       calcium carbonate (Oscal) 500 mg calcium (1,250 mg) tablet Take 1 tablet (1,250 mg) by mouth every 12 hours.       pantoprazole (ProtoNix) 40 mg EC tablet Take 1 tablet (40 mg) by mouth.       traMADol (Ultram) 50 mg tablet  Take 1 tablet (50 mg) by mouth every 6 hours if needed for moderate pain (4 - 6).       acetaminophen (Tylenol) 325 mg tablet Take 1 tablet (325 mg) by mouth every 6 hours if needed for mild pain (1 - 3).       amLODIPine (Norvasc) 2.5 mg tablet Take 4 tablets (10 mg) by mouth once daily.       guaiFENesin (Robitussin) 100 mg/5 mL syrup Take 5 mL (100 mg) by mouth 4 times a day as needed for cough.       melaton-genistein-herb no.233 1.5-15-22 mg-mg-mcg tablet,chewable Chew 3 mg once daily.       mirtazapine (Remeron) 15 mg tablet Take 1 tablet (15 mg) by mouth once every 24 hours.       nitroglycerin (Nitrostat) 0.4 mg SL tablet Place 1 tablet (0.4 mg) under the tongue every 5 minutes if needed for chest pain.       ondansetron (Zofran) 4 mg tablet Take 1 tablet (4 mg) by mouth every 6 hours if needed for nausea or vomiting.       potassium chloride CR 20 mEq ER tablet Take 1 tablet (20 mEq) by mouth once daily. Do not crush or chew.       sertraline (Zoloft) 25 mg tablet Take 1 tablet (25 mg) by mouth once daily.        Current medications:  Scheduled medications  [Held by provider] amLODIPine, 10 mg, oral, Daily  atorvastatin, 80 mg, oral, Nightly  enoxaparin, 30 mg, intravenous, Daily  [Held by provider] ferrous sulfate, 65 mg of iron, oral, Daily with breakfast  fluticasone furoate-vilanteroL, 1 puff, inhalation, Daily  ipratropium-albuteroL, 3 mL, nebulization, q4h  melatonin, 5 mg, oral, Nightly  mirtazapine, 15 mg, oral, q24h  oxygen, , inhalation, Continuous - 02/gases  pantoprazole, 40 mg, intravenous, Daily  piperacillin-tazobactam, 3.375 g, intravenous, q6h  predniSONE, 40 mg, oral, Daily  sertraline, 25 mg, oral, Daily  tiotropium, 2 puff, inhalation, Daily  vancomycin, 1,000 mg, intravenous, q24h      Continuous medications     PRN medications  PRN medications: acetaminophen **OR** acetaminophen **OR** acetaminophen, acetaminophen, guaiFENesin, ipratropium-albuteroL, ondansetron ODT **OR** ondansetron,  oxygen    Review of Systems     Objective  Range of Vitals (last 24 hours)  Heart Rate:  []   Temp:  [36 °C (96.8 °F)-36.9 °C (98.4 °F)]   Resp:  [18-25]   BP: ()/(72-84)   SpO2:  [94 %-100 %]   Daily Weight  10/02/23 : 48.3 kg (106 lb 7.7 oz)    Body mass index is 15.72 kg/m². Nutritional consult     Physical Exam  Constitutional:       Appearance: Normal appearance.      Comments: cachectic   HENT:      Head: Normocephalic and atraumatic.      Mouth/Throat:      Mouth: Mucous membranes are moist.      Pharynx: Oropharynx is clear.   Eyes:      Pupils: Pupils are equal, round, and reactive to light.   Cardiovascular:      Rate and Rhythm: Normal rate and regular rhythm.      Heart sounds: Normal heart sounds.   Pulmonary:      Effort: Pulmonary effort is normal.      Breath sounds: Normal breath sounds.   Abdominal:      General: Abdomen is flat. Bowel sounds are normal.      Palpations: Abdomen is soft.   Musculoskeletal:      Cervical back: Normal range of motion.   Neurological:      Mental Status: She is alert.          Relevant Results  Outside Hospital Results    Labs  Results from last 72 hours   Lab Units 10/03/23  0528 10/02/23  1318 10/02/23  0624 10/02/23  0018   WBC AUTO x10*3/uL 14.1* 7.0 7.5 13.1*   HEMOGLOBIN g/dL 8.3* 8.9* 7.2* 6.0*   HEMATOCRIT % 26.1* 28.9* 24.2* 20.1*   PLATELETS AUTO x10*3/uL 370 401 411 360   NEUTROS PCT AUTO %  --   --  92.7 79.9   LYMPHS PCT AUTO %  --   --  5.8 11.8   MONOS PCT AUTO %  --   --  0.7 5.7   EOS PCT AUTO %  --   --  0.1 1.8     Results from last 72 hours   Lab Units 10/03/23  0528 10/02/23  0624 10/02/23  0018   SODIUM mmol/L 138 139 137   POTASSIUM mmol/L 3.7 3.4* 4.1   CHLORIDE mmol/L 105 104 100   CO2 mmol/L 23 22 26   BUN mg/dL 19 16 18   CREATININE mg/dL 0.98 0.82 0.94   GLUCOSE mg/dL 109* 156* 140*   CALCIUM mg/dL 8.6 8.5* 9.0   ANION GAP mmol/L 14 16 15   EGFR mL/min/1.73m*2 62 77 65   PHOSPHORUS mg/dL 3.5 3.0  --      Results from last 72  "hours   Lab Units 10/03/23  0528 10/02/23  0624 10/02/23  0018   ALK PHOS U/L  --  121 147*   BILIRUBIN TOTAL mg/dL  --  0.5 0.3   PROTEIN TOTAL g/dL  --  6.4 6.8   ALT U/L  --  18 18   AST U/L  --  27 37   ALBUMIN g/dL 3.6 3.8 4.1     Estimated Creatinine Clearance: 40.7 mL/min (by C-G formula based on SCr of 0.98 mg/dL).  No results found for: \"CRP\", \"SEDRATE\"  No results found for: \"HIV1X2\", \"HIVCONF\", \"BPWHWF4CA\"  No results found for: \"HEPCABINIT\", \"HEPCAB\", \"HCVPCRQUANT\"  Microbiology  No results found for the last 14 days.    Assessment/Plan     Bacteremia  Respiratory failure / atelectasis / possible pneumonia  Failure to thrive    Recommendations :  Continue Vancomycin  Discontinue Zosyn  Repeat the BC  ESR and CRP  Chest PT / incentive spirometry    I spent  minutes in the professional and overall care of this patient.      Leslye Cortez MD  "

## 2023-10-03 NOTE — PROGRESS NOTES
Progress Note    Christie Arias     Subjective   Interval History: No acute events overnight. On encounter this AM, patient with significant audible wheezing and SOB. Quickly resolved with duonebs. Denies any CP, SOB, N/V. Has not had a BM since admission. Says that she's been eating well.    Objective   Vital signs in last 24 hours:  Temp:  [36 °C (96.8 °F)-36.9 °C (98.4 °F)] 36.6 °C (97.9 °F)  Heart Rate:  [] 99  Resp:  [18-25] 20  BP: ()/(72-84) 106/74  FiO2 (%):  [40 %] 40 %    Intake/Output last 3 shifts:  I/O last 3 completed shifts:  In: 3424.9 (70.9 mL/kg) [P.O.:240; Blood:1062.9; IV Piggyback:2122]  Out: 850 (17.6 mL/kg) [Urine:850 (0.5 mL/kg/hr)]  Weight: 48.3 kg   Intake/Output this shift:  I/O this shift:  In: -   Out: 900 [Urine:900]    Physical Exam  Constitutional:       Comments: Cachetic women in mild distress   HENT:      Head: Normocephalic and atraumatic.      Mouth/Throat:      Comments: Poor dentition  Eyes:      Extraocular Movements: Extraocular movements intact.      Conjunctiva/sclera: Conjunctivae normal.      Pupils: Pupils are equal, round, and reactive to light.   Cardiovascular:      Rate and Rhythm: Tachycardia present.   Pulmonary:      Breath sounds: Wheezing present.   Abdominal:      General: Abdomen is flat. Bowel sounds are normal.      Palpations: Abdomen is soft.   Musculoskeletal:         General: Normal range of motion.   Skin:     General: Skin is warm and dry.   Neurological:      General: No focal deficit present.      Mental Status: She is oriented to person, place, and time. Mental status is at baseline.   Psychiatric:         Mood and Affect: Mood normal.         Scheduled medications  [Held by provider] amLODIPine, 10 mg, oral, Daily  atorvastatin, 80 mg, oral, Nightly  [Held by provider] ferrous sulfate, 65 mg of iron, oral, Daily with breakfast  fluticasone furoate-vilanteroL, 1 puff, inhalation, Daily  ipratropium-albuteroL, 3 mL, nebulization,  q4h  melatonin, 5 mg, oral, Nightly  mirtazapine, 15 mg, oral, q24h  oxygen, , inhalation, Continuous - 02/gases  pantoprazole, 40 mg, intravenous, Daily  predniSONE, 40 mg, oral, Daily  sertraline, 25 mg, oral, Daily  tiotropium, 2 puff, inhalation, Daily      Continuous medications     PRN medications  PRN medications: acetaminophen **OR** acetaminophen **OR** acetaminophen, acetaminophen, guaiFENesin, ipratropium-albuteroL, ondansetron ODT **OR** ondansetron, oxygen     Results  Lab Results   Component Value Date    WBC 14.1 (H) 10/03/2023    HGB 8.3 (L) 10/03/2023    HCT 26.1 (L) 10/03/2023    MCV 76 (L) 10/03/2023     10/03/2023      Lab Results   Component Value Date    GLUCOSE 109 (H) 10/03/2023    CALCIUM 8.6 10/03/2023     10/03/2023    K 3.7 10/03/2023    CO2 23 10/03/2023     10/03/2023    BUN 19 10/03/2023    CREATININE 0.98 10/03/2023        Assessment/Plan     Christie Arias is a 68 y.o. 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      Acute medical conditions:    #Acute hypoxic and hypercapneic respiratory failure likely 2/2 COPD exacerbation  #R/o pneumonia  Unlikely infection given no fever and interval improvement of LLL opacification/mucous plugging on CT PE  - S/p Vanc and Zosyn (10/1 - 10/2)  - S/p solumedrol in ED  - Blood cx 1/2 (10/2) positive for gram pos cocci in clusters, final results pending  - Procal 0.16  Plan  - Cont Prednisone 40mg (5days) (10/2 - current)  - Scheduled Duonebs q4hrs and PRN, Spiriva, Breo  - Bronchial hygiene (acapella) and incentive spirometry  - Keep SPO2 above 88%, wean O2 as tolerated  - Repeat blood cx, CRP, ESR  - Suspect cx positive from contamination  - Consult ID, rec continuing vanc (10/3 - current)     #Acute on chronic anemia likely 2/2 JIE  Less likely UGIB  given no clinical signs or MDS given only RBCs affected  - Hgb on admission: 6.0 (baseline 9.0 to 7.8), MCV 74 with FOBT positive   - Iron studies (10/2/2022): iron 18, % sat 4, TIBC 418, ferritin 19  - EGD (11/2022) unremarkable; Colonoscopy (11/2022): diverticulosis in sigmoid colon   - Normal BUN:Cr ratio  - s/p 2 units of blood transfusion, venofer 10/2  - Cont IV protonix 40mg daily  - Hold PO iron supplementation per GI recs as it may produce black stools  - Plan for OP heme onc follow-up     #Non-MI Troponin elevation likely 2/2 demand ischemia  - EKG on admission: Sinus tachycardia with no ST or T wave morphologies  - Echo (07/2023): Diastolic dysfunction with LVEF 60%  - Trop: 2074->1201  - Pt on telemetry  - Monitor Hgb and transfuse if < 7.0  - Not starting heparin gtt or giving antiplatelets due to possible blood loss anemia     #Failure to thrive  - consult nutrition     Chronic medical conditions  #HTN: hold home amlodipine 10mg given soft BPs  #MDD: cont zoloft and remeron     F: PO  E: replete as needed  N: regular diet  GI ppx: Protonix  DVTppx: lovenox  Abx: Vanc (10/3- current)     Dispo: Admitted for COPD exacerbation and anemia s/p transfusion. Est LOS > 48h.      Principal Problem:    Respiratory failure (CMS/HCC)  Active Problems:    Severe protein-calorie malnutrition (CMS/HCC)    Iron deficiency anemia      Delilah Sandoval MD

## 2023-10-03 NOTE — PROGRESS NOTES
Occupational Therapy    Evaluation    Patient Name: Christie Arias  MRN: [id number]  Today's Date: 10/3/2023  Time Calculation  Start Time: 1242  Stop Time: 1258  Time Calculation (min): 16 min    Assessment  IP OT Assessment  OT Assessment: Pt is a 69 y/o woman who was admitted for worsening SOB. Pt is from LTC and requires assist for all ADL's and one person assist for stand pivot transfers. Pt is currently at her baseline for ADL's. No skilled acute OT needs  Prognosis: Good  Barriers to Discharge: None  Evaluation/Treatment Tolerance: Patient limited by fatigue (Limited by SOB/anxiety)  Medical Staff Made Aware: Yes  Plan:  No Skilled OT: At baseline function (Pt has assist with all ADL's at the facility)  OT Frequency:  (OT eval and discharge)  OT Discharge Recommendations:  (Return to LTC with 24-HR supervision d/t cognition)  OT - OK to Discharge: Yes    Subjective   Current Problem:  1. Respiratory failure (CMS/[facility])        2. COPD exacerbation (CMS/[facility])        3. Pneumonia due to infectious organism, unspecified laterality, unspecified part of lung        4. 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 Pt was sitting up in the bed finishing her lunch. Pt was agreeable to OT eval. Pt was oriented x3. Pt was very anxious at the end of the session d/t SOB after mobility.  Precautions:  Medical Precautions: Fall precautions, Oxygen therapy device and L/min (Pt was  connected to IV, telemetry, pure-wick, 2 L of oxygen that was bumped up to 4 L of oxygen during session, and alarms.)  Vital Signs:  Heart Rate:  (130-150 throughout the session)  Heart Rate Source: Monitor  SpO2:  (86-92% during the session)  Pain:  Pain Assessment  Pain Assessment: 0-10  Pain Score: 0 - No pain    Objective   Cognition:  Overall Cognitive Status: Impaired at baseline (History of dementia)     Home Living:  Type of Home: Long Term Care facility ([facility])  Lives With: Alone  Home Adaptive Equipment: Wheelchair-manual   Prior Function:  Level of [place]: Needs assistance with ADLs, Needs assistance with functional transfers  Receives Help From:  (Staff at [facility])  ADL Assistance: Needs assistance  Ambulatory Assistance:  (Pt does not ambulate. She transfers with one assist to/from wheelchair, bed and toilet)  Prior Function Comments: Pt is from [facility]. Pt receives assist for transfers and ADL's. Pt able to self propel her wheelchair    ADL:  LE Dressing Assistance: Total  LE Dressing Deficit: Thread RLE into underwear, Thread LLE into underwear, Pull up over hips  ADL Comments: Anticipate pt requires max-total A for all other LB ADL's.  Activity Tolerance:  Endurance: Tolerates less than 10 min exercise with changes in vital signs  Activity Tolerance Comments: Pt very anxious during the session and SOB. Pt's SpO2 ranged from 86-92%. Pt required cues for relaxation and pursed lip breathing.  Bed Mobility/Transfers: Bed Mobility  Bed Mobility: Yes  Bed Mobility 1  Bed Mobility 1: Supine to sitting  Level of Assistance 1: Maximum assistance  Bed Mobility 2  Bed Mobility  2: Sitting to supine  Level of Assistance 2: Maximum assistance  Bed Mobility 3  Bed Mobility 3:  (Sitting EOB)  Level of Assistance 3: Minimum assistance, Contact guard (With UE support)   and Transfers  Transfer: Yes  Transfer 1  Transfer From 1: Sit to  Transfer to 1: Stand  Technique 1: Sit to stand  Transfer Device  1:  (Therapist in front of the patient)  Transfer Level of Assistance 1: Maximum assistance  Transfers 2  Transfer From 2: Stand to  Transfer to 2: Sit  Technique 2: Stand to sit  Transfer Device 2:  (Therapist in front of patient)  Transfer Level of Assistance 2: Maximum assistance    Outcome Measures: [facility] Daily Activity  Putting on and taking off regular lower body clothing: Total  Bathing (including washing, rinsing, drying): A lot  Putting on and taking off regular upper body clothing: A little  Toileting, which includes using toilet, bedpan or urinal: Total  Taking care of personal grooming such as brushing teeth: A little  Eating Meals: None  Daily Activity - Total Score: 14      Education Documentation  OT plan of care, taught by Mable Bernabe OT at 10/3/2023  1:30 PM.  Learner: Patient  Readiness: Acceptance  Method: Explanation  Response: Verbalizes Understanding, Needs Reinforcement    Energy conservation, taught by Mable Bernabe OT at 10/3/2023  1:28 PM.  Learner: Patient  Readiness: Acceptance  Method: Explanation, Demonstration  Response: Verbalizes Understanding, Needs Reinforcement, Demonstrated Understanding    Education Comments  No comments found.

## 2023-10-03 NOTE — CONSULTS
Reason for Assessment  Reason for Assessment: Provider consult order    Principal Problem:    Respiratory failure (CMS/HCC)  Active Problems:    Severe protein-calorie malnutrition (CMS/HCC)    Iron deficiency anemia      History:  Food and Nutrient History  Energy Intake:  (MARY; unsuccesful attempt to contact [facility]([phone]); pt noted d/ox2 did not interview this date)    Diet Experience  Previous Diet / Nutrition Education / Counseling: Per [facility]: Regular diet thin liquids, Boost BID 120mL    Nutrition-Related ADLs and IADLs  Physical Ability to Self-Feed: Yes (independant)    Allergies: Codeine    Anthropometrics:    Daily Weight  10/02/23 : 48.3 kg (106 lb 7.7 oz) -desirable weight gain  08/31/22 : (!) 42 kg (92 lb 9.6 oz)  07/28/22 : 38.6 kg (85 lb 2 oz)  07/26/22 : 39.1 kg (86 lb 3 oz)  07/19/22 : 39.1 kg (86 lb 3 oz)  06/10/22 : (!) 38.1 kg (84 lb)    Height: 175.3cm  Body mass index is 15.72 kg/m².     Results from last 7 days   Lab Units 10/03/23  0528 10/02/23  1318 10/02/23  0624   SODIUM mmol/L 138  --  139   CHLORIDE mmol/L 105  --  104   BUN mg/dL 19  --  16   CO2 mmol/L 23  --  22   CREATININE mg/dL 0.98  --  0.82   CALCIUM mg/dL 8.6  --  8.5*   PHOSPHORUS mg/dL 3.5  --  3.0   MAGNESIUM mg/dL 1.88  --  1.80   BILIRUBIN TOTAL mg/dL  --   --  0.5   ALT U/L  --   --  18   AST U/L  --   --  27   WBC AUTO x10*3/uL 14.1*   < > 7.5    < > = values in this interval not displayed.       Nutrition Focused Physical Findings:  Subcutaneous Fat Loss  Orbital Fat Pads:  (visually noted thin face, no physical assesment complete, not appropriate)    Muscle Wasting  Temporalis:  (visually noted severe muscle wasting, pt very thin)    Edema  Edema: none    Skin: no noted areas  Pain: no pain noted    Diagnosis   Malnutrition Diagnosis  Patient has Malnutrition Diagnosis: Yes  Diagnosis Status: New  Malnutrition Diagnosis: Severe malnutrition related to disease or condition  As  Evidenced by: severe muscle and fat wasting, BMI 15.72  Additional Assessment Information: obtain intake history as available    Interventions/Recommendations   Nutrition Prescription  Individualized Nutrition Prescription Provided for : Ensure Plus HP BID (350kcal, 20g protein each)    Food and/or Nutrient Delivery Interventions  Interventions: Meals and snacks    Meals and Snacks:  (Anthon diet, encourage snacks and PO as able)      Nutrition Counseling  Counseling Theoretical Approach:  (Pt from SNF where meals and meds are managed, available if needs arise)      Monitoring and Evaluation   Amount of food  Meal time behavior  Supplement consumption/acceptance    Follow Up  Time Spent (min): 90 minutes  Last Date of Nutrition Visit: 10/03/23  Nutrition Follow-Up Needed?: Dietitian to reassess per policy

## 2023-10-03 NOTE — CARE PLAN
The patient's goals for the shift include more stamina    The clinical goals for the shift include less oxygen    Over the shift, the patient did not make progress toward the following goals. Barriers to progression include impaired mobility. Recommendations to address these barriers include PT.

## 2023-10-04 LAB
ALBUMIN SERPL BCP-MCNC: 3.6 G/DL (ref 3.4–5)
ANION GAP SERPL CALC-SCNC: 14 MMOL/L (ref 10–20)
BUN SERPL-MCNC: 19 MG/DL (ref 6–23)
CALCIUM SERPL-MCNC: 8.4 MG/DL (ref 8.6–10.3)
CHLORIDE SERPL-SCNC: 104 MMOL/L (ref 98–107)
CO2 SERPL-SCNC: 24 MMOL/L (ref 21–32)
CREAT SERPL-MCNC: 0.83 MG/DL (ref 0.5–1.05)
ERYTHROCYTE [DISTWIDTH] IN BLOOD BY AUTOMATED COUNT: 18.6 % (ref 11.5–14.5)
GFR SERPL CREATININE-BSD FRML MDRD: 76 ML/MIN/1.73M*2
GLUCOSE SERPL-MCNC: 84 MG/DL (ref 74–99)
HCT VFR BLD AUTO: 27.5 % (ref 36–46)
HGB BLD-MCNC: 8.5 G/DL (ref 12–16)
MAGNESIUM SERPL-MCNC: 1.8 MG/DL (ref 1.6–2.4)
MCH RBC QN AUTO: 23.6 PG (ref 26–34)
MCHC RBC AUTO-ENTMCNC: 30.9 G/DL (ref 32–36)
MCV RBC AUTO: 76 FL (ref 80–100)
NRBC BLD-RTO: 0.2 /100 WBCS (ref 0–0)
PHOSPHATE SERPL-MCNC: 2.6 MG/DL (ref 2.5–4.9)
PLATELET # BLD AUTO: 411 X10*3/UL (ref 150–450)
PMV BLD AUTO: 11.1 FL (ref 7.5–11.5)
POTASSIUM SERPL-SCNC: 2.9 MMOL/L (ref 3.5–5.3)
RBC # BLD AUTO: 3.6 X10*6/UL (ref 4–5.2)
SODIUM SERPL-SCNC: 139 MMOL/L (ref 136–145)
STAPHYLOCOCCUS SPEC CULT: NORMAL
VANCOMYCIN SERPL-MCNC: 12.4 UG/ML (ref 5–20)
WBC # BLD AUTO: 13.6 X10*3/UL (ref 4.4–11.3)

## 2023-10-04 PROCEDURE — 96372 THER/PROPH/DIAG INJ SC/IM: CPT

## 2023-10-04 PROCEDURE — 36415 COLL VENOUS BLD VENIPUNCTURE: CPT

## 2023-10-04 PROCEDURE — 85027 COMPLETE CBC AUTOMATED: CPT

## 2023-10-04 PROCEDURE — S0166 INJ OLANZAPINE 2.5MG: HCPCS

## 2023-10-04 PROCEDURE — 2500000004 HC RX 250 GENERAL PHARMACY W/ HCPCS (ALT 636 FOR OP/ED)

## 2023-10-04 PROCEDURE — 2500000001 HC RX 250 WO HCPCS SELF ADMINISTERED DRUGS (ALT 637 FOR MEDICARE OP)

## 2023-10-04 PROCEDURE — C9113 INJ PANTOPRAZOLE SODIUM, VIA: HCPCS

## 2023-10-04 PROCEDURE — 94667 MNPJ CHEST WALL 1ST: CPT

## 2023-10-04 PROCEDURE — 2500000005 HC RX 250 GENERAL PHARMACY W/O HCPCS

## 2023-10-04 PROCEDURE — 94760 N-INVAS EAR/PLS OXIMETRY 1: CPT

## 2023-10-04 PROCEDURE — 99232 SBSQ HOSP IP/OBS MODERATE 35: CPT

## 2023-10-04 PROCEDURE — 2060000001 HC INTERMEDIATE ICU ROOM DAILY

## 2023-10-04 PROCEDURE — 2500000002 HC RX 250 W HCPCS SELF ADMINISTERED DRUGS (ALT 637 FOR MEDICARE OP, ALT 636 FOR OP/ED): Performed by: INTERNAL MEDICINE

## 2023-10-04 PROCEDURE — 94640 AIRWAY INHALATION TREATMENT: CPT

## 2023-10-04 PROCEDURE — 2500000005 HC RX 250 GENERAL PHARMACY W/O HCPCS: Performed by: INTERNAL MEDICINE

## 2023-10-04 PROCEDURE — 83735 ASSAY OF MAGNESIUM: CPT

## 2023-10-04 PROCEDURE — 80202 ASSAY OF VANCOMYCIN: CPT

## 2023-10-04 PROCEDURE — 80069 RENAL FUNCTION PANEL: CPT

## 2023-10-04 RX ORDER — IPRATROPIUM BROMIDE AND ALBUTEROL SULFATE 2.5; .5 MG/3ML; MG/3ML
3 SOLUTION RESPIRATORY (INHALATION)
Status: DISCONTINUED | OUTPATIENT
Start: 2023-10-04 | End: 2023-10-06

## 2023-10-04 RX ORDER — OLANZAPINE 10 MG/2ML
5 INJECTION, POWDER, FOR SOLUTION INTRAMUSCULAR ONCE
Status: COMPLETED | OUTPATIENT
Start: 2023-10-04 | End: 2023-10-04

## 2023-10-04 RX ORDER — POTASSIUM CHLORIDE 1.5 G/1.58G
20 POWDER, FOR SOLUTION ORAL 2 TIMES DAILY
Status: DISCONTINUED | OUTPATIENT
Start: 2023-10-04 | End: 2023-10-06

## 2023-10-04 RX ORDER — POTASSIUM CHLORIDE 14.9 MG/ML
20 INJECTION INTRAVENOUS
Status: COMPLETED | OUTPATIENT
Start: 2023-10-04 | End: 2023-10-04

## 2023-10-04 RX ADMIN — PANTOPRAZOLE SODIUM 40 MG: 40 INJECTION, POWDER, FOR SOLUTION INTRAVENOUS at 08:45

## 2023-10-04 RX ADMIN — FLUTICASONE FUROATE AND VILANTEROL TRIFENATATE 1 PUFF: 100; 25 POWDER RESPIRATORY (INHALATION) at 08:45

## 2023-10-04 RX ADMIN — POTASSIUM CHLORIDE 20 MEQ: 1.5 FOR SOLUTION ORAL at 10:36

## 2023-10-04 RX ADMIN — Medication 2 L/MIN: at 08:00

## 2023-10-04 RX ADMIN — Medication 2 L/MIN: at 15:05

## 2023-10-04 RX ADMIN — ATORVASTATIN CALCIUM 80 MG: 80 TABLET, FILM COATED ORAL at 20:56

## 2023-10-04 RX ADMIN — Medication 5 MG: at 20:56

## 2023-10-04 RX ADMIN — POTASSIUM CHLORIDE 20 MEQ: 14.9 INJECTION, SOLUTION INTRAVENOUS at 13:02

## 2023-10-04 RX ADMIN — OLANZAPINE 5 MG: 10 INJECTION, POWDER, FOR SOLUTION INTRAMUSCULAR at 06:28

## 2023-10-04 RX ADMIN — IPRATROPIUM BROMIDE AND ALBUTEROL SULFATE 3 ML: 2.5; .5 SOLUTION RESPIRATORY (INHALATION) at 15:05

## 2023-10-04 RX ADMIN — POTASSIUM CHLORIDE 20 MEQ: 14.9 INJECTION, SOLUTION INTRAVENOUS at 10:31

## 2023-10-04 RX ADMIN — IPRATROPIUM BROMIDE AND ALBUTEROL SULFATE 3 ML: 2.5; .5 SOLUTION RESPIRATORY (INHALATION) at 11:52

## 2023-10-04 RX ADMIN — IPRATROPIUM BROMIDE AND ALBUTEROL SULFATE 3 ML: 2.5; .5 SOLUTION RESPIRATORY (INHALATION) at 09:17

## 2023-10-04 RX ADMIN — PREDNISONE 40 MG: 20 TABLET ORAL at 08:45

## 2023-10-04 RX ADMIN — ENOXAPARIN SODIUM 30 MG: 30 INJECTION SUBCUTANEOUS at 13:00

## 2023-10-04 RX ADMIN — IPRATROPIUM BROMIDE AND ALBUTEROL SULFATE 3 ML: 2.5; .5 SOLUTION RESPIRATORY (INHALATION) at 20:24

## 2023-10-04 RX ADMIN — VANCOMYCIN HYDROCHLORIDE 1000 MG: 1 INJECTION, SOLUTION INTRAVENOUS at 15:45

## 2023-10-04 RX ADMIN — MIRTAZAPINE 15 MG: 15 TABLET, FILM COATED ORAL at 20:56

## 2023-10-04 RX ADMIN — SERTRALINE HYDROCHLORIDE 25 MG: 50 TABLET ORAL at 08:45

## 2023-10-04 RX ADMIN — TIOTROPIUM BROMIDE INHALATION SPRAY 2 PUFF: 3.12 SPRAY, METERED RESPIRATORY (INHALATION) at 08:46

## 2023-10-04 RX ADMIN — POTASSIUM CHLORIDE 20 MEQ: 1.5 FOR SOLUTION ORAL at 20:56

## 2023-10-04 ASSESSMENT — COGNITIVE AND FUNCTIONAL STATUS - GENERAL
HELP NEEDED FOR BATHING: A LOT
PERSONAL GROOMING: A LITTLE
WALKING IN HOSPITAL ROOM: A LOT
MOVING TO AND FROM BED TO CHAIR: A LOT
EATING MEALS: A LITTLE
MOVING TO AND FROM BED TO CHAIR: A LITTLE
TURNING FROM BACK TO SIDE WHILE IN FLAT BAD: A LITTLE
TURNING FROM BACK TO SIDE WHILE IN FLAT BAD: A LITTLE
MOBILITY SCORE: 13
STANDING UP FROM CHAIR USING ARMS: A LITTLE
DRESSING REGULAR UPPER BODY CLOTHING: A LITTLE
STANDING UP FROM CHAIR USING ARMS: A LOT
DAILY ACTIVITIY SCORE: 16
CLIMB 3 TO 5 STEPS WITH RAILING: A LOT
HELP NEEDED FOR BATHING: A LOT
MOBILITY SCORE: 16
TOILETING: A LOT
PERSONAL GROOMING: A LITTLE
WALKING IN HOSPITAL ROOM: A LOT
MOVING FROM LYING ON BACK TO SITTING ON SIDE OF FLAT BED WITH BEDRAILS: A LITTLE
MOVING FROM LYING ON BACK TO SITTING ON SIDE OF FLAT BED WITH BEDRAILS: A LITTLE
DRESSING REGULAR UPPER BODY CLOTHING: A LITTLE
DAILY ACTIVITIY SCORE: 15
DRESSING REGULAR LOWER BODY CLOTHING: A LOT
TOILETING: A LOT
CLIMB 3 TO 5 STEPS WITH RAILING: TOTAL
DRESSING REGULAR LOWER BODY CLOTHING: A LOT

## 2023-10-04 ASSESSMENT — PAIN - FUNCTIONAL ASSESSMENT
PAIN_FUNCTIONAL_ASSESSMENT: 0-10
PAIN_FUNCTIONAL_ASSESSMENT: 0-10

## 2023-10-04 ASSESSMENT — PAIN SCALES - GENERAL
PAINLEVEL_OUTOF10: 0 - NO PAIN
PAINLEVEL_OUTOF10: 0 - NO PAIN

## 2023-10-04 NOTE — PROGRESS NOTES
"Vancomycin Dosing by Pharmacy- FOLLOW UP    Christie Arias is a 68 y.o. year old female who Pharmacy has been consulted for vancomycin dosing for line infections. Based on the patient's indication and renal status this patient is being dosed based on a goal AUC of 400-600.     Renal function is currently stable.    Current vancomycin dose: 1000 mg given every 24 hours    Estimated vancomycin AUC on current dose: 473 mg/L.hr     Visit Vitals  BP 78/53 (BP Location: Left arm, Patient Position: Lying)   Pulse (!) 112   Temp 36.6 °C (97.9 °F) (Temporal)   Resp 19        Lab Results   Component Value Date    CREATININE 0.83 10/04/2023    CREATININE 0.98 10/03/2023    CREATININE 0.82 10/02/2023    CREATININE 0.94 10/02/2023        Patient weight is No results found for: \"PTWEIGHT\"    No results found for: \"CULTURE\"     I/O last 3 completed shifts:  In: 440 (9.1 mL/kg) [P.O.:240; IV Piggyback:200]  Out: 1400 (29 mL/kg) [Urine:1400 (0.8 mL/kg/hr)]  Weight: 48.3 kg   [unfilled]    Lab Results   Component Value Date    PATIENTTEMP 37.0 10/02/2023    PATIENTTEMP 37.0 10/02/2023    PATIENTTEMP 37.0 04/12/2023    PATIENTTEMP 37.0 07/07/2022        Assessment/Plan    Within goal AUC range. Continue current vancomycin regimen.    This dosing regimen is predicted by [employer] to result in the following pharmacokinetic parameters:    Loading dose: N/A  Regimen: 1000 mg IV every 24 hours.  Start time: 01:51 on 10/05/2023  Exposure target: AUC24 (range)400-600 mg/L.hr   AUC24,ss: 473 mg/L.hr  Probability of AUC24 > 400: 82 %  Ctrough,ss: 13.4 mg/L  Probability of Ctrough,ss > 20: 6 %  Probability of nephrotoxicity (Lodise JOSÉ MIGUEL 2009): 9 %    The next level will be obtained on 10/6 at 0600. May be obtained sooner if clinically indicated.   Will continue to monitor renal function daily while on vancomycin and order serum creatinine at least every 48 hours if not already ordered.  Follow for continued vancomycin needs, clinical " response, and signs/symptoms of toxicity.       Florentin Schultz, PharmD, BCPS, BCCCP  Clinical Pharmacy Specialist- Internal Medicine  Available via EPIC Secure Chat  Phone: [phone]

## 2023-10-04 NOTE — PROGRESS NOTES
10/04/23 1046   Discharge Planning   Living Arrangements Other (Comment)   Support Systems Other (Comment)   Assistance Needed LTC at [place], assist for transfer to wheelchair, room air baseline,   Type of Residence Nursing home/residential care   Care Facility Name [facility]   Home or Post Acute Services Post acute facilities (Rehab/SNF/etc)   Type of Post Acute Facility Services Long term care   Patient expects to be discharged to: [facility], no precert needed.   Does the patient need discharge transport arranged? Yes   RoundTrip coordination needed? Yes   Has discharge transport been arranged? No        10/06/23 0846   Discharge Planning   Living Arrangements Other (Comment)  ([place])   Support Systems Other (Comment)   Assistance Needed LTC at [place], assit for transfers to Wheelchair, room air baseline.   Type of Residence Nursing home/residential care   Care Facility Name [facility]   Home or Post Acute Services Post acute facilities (Rehab/SNF/etc)   Type of Post Acute Facility Services Long term care   Patient expects to be discharged to: [facility], referral sent, No precert needed.   Does the patient need discharge transport arranged? Yes   RoundTrip coordination needed? Yes   Has discharge transport been arranged? No        10/09/23 1034   Discharge Planning   Living Arrangements Other (Comment)  ([facility])   Support Systems Other (Comment)   Assistance Needed LTC at [place] requiring assistance for transfers, room air baseline   Type of Residence Nursing home/residential care   Care Facility Name [facility]   Home or Post Acute Services Post acute facilities (Rehab/SNF/etc)   Type of Post Acute Facility Services Long term care   Patient expects to be discharged to: [facility], referral sent, no precert needed   Does the patient need discharge transport arranged? Yes   RoundTrip coordination needed? Yes   Has discharge transport been arranged? No

## 2023-10-04 NOTE — PROGRESS NOTES
Christie Arias is a 68 y.o. female on day 2 of admission presenting with Respiratory failure (CMS/HCC).    Subjective   Interval History: no fever, no new complaints         Review of Systems    Objective   Range of Vitals (last 24 hours)  Heart Rate:  []   Temp:  [36 °C (96.8 °F)-36.8 °C (98.2 °F)]   Resp:  [16-23]   BP: ()/(53-98)   SpO2:  [92 %-96 %]   Daily Weight  10/02/23 : 48.3 kg (106 lb 7.7 oz)    Body mass index is 15.72 kg/m².    Physical Exam  Constitutional:       Appearance: Normal appearance.   HENT:      Head: Normocephalic and atraumatic.      Mouth/Throat:      Mouth: Mucous membranes are moist.      Pharynx: Oropharynx is clear.   Eyes:      Pupils: Pupils are equal, round, and reactive to light.   Cardiovascular:      Rate and Rhythm: Normal rate and regular rhythm.      Heart sounds: Normal heart sounds.   Pulmonary:      Effort: Pulmonary effort is normal.      Breath sounds: Normal breath sounds.   Abdominal:      General: Abdomen is flat. Bowel sounds are normal.      Palpations: Abdomen is soft.   Musculoskeletal:      Cervical back: Normal range of motion.   Neurological:      Mental Status: She is alert.         Antibiotics  sodium chloride 0.9 % bolus 500 mL  methylPREDNISolone sod succinate (PF) (SOLU-Medrol) injection 125 mg  piperacillin-tazobactam-dextrose (Zosyn) IV 4.5 g  vancomycin in dextrose 5 % (Vancocin) IVPB 1 g  oxygen (O2) therapy  sodium chloride 0.9 % bolus 1,572 mL  iohexol (OMNIPaque) 350 mg iodine/mL injection 100 mL  oxygen (O2) therapy  ondansetron ODT (Zofran-ODT) disintegrating tablet 4 mg  ondansetron (Zofran) injection 4 mg  acetaminophen (Tylenol) tablet 650 mg  acetaminophen (Tylenol) oral liquid 650 mg  acetaminophen (Tylenol) suppository 650 mg  pantoprazole (ProtoNix) injection 80 mg  pantoprazole (ProtoNix) injection 40 mg  ipratropium-albuteroL (Duo-Neb) 0.5-2.5 mg/3 mL nebulizer solution 3 mL  piperacillin-tazobactam-dextrose (Zosyn) IV  3.375 g  predniSONE (Deltasone) tablet 40 mg  vancomycin in dextrose 5 % (Vancocin) IVPB 500 mg  ipratropium-albuteroL (Duo-Neb) 0.5-2.5 mg/3 mL nebulizer solution 3 mL  ipratropium-albuteroL (Duo-Neb) 0.5-2.5 mg/3 mL nebulizer solution 3 mL  ipratropium-albuteroL (Duo-Neb) 0.5-2.5 mg/3 mL nebulizer solution 3 mL  tiotropium (Spiriva Respimat) 2.5 mcg/actuation inhaler 2 puff  fluticasone furoate-vilanteroL (Breo Ellipta) 100-25 mcg/dose inhaler 1 puff  amLODIPine (Norvasc) tablet  aspirin EC tablet      traMADol (Ultram) tablet  pantoprazole (ProtoNix) EC tablet  mirtazapine (Remeron) tablet      nitroglycerin (Nitrostat) SL tablet  potassium chloride SA (Klor-Con M20) ER tablet  sertraline (Zoloft) tablet  acetaminophen (Tylenol) tablet  ondansetron (Zofran) tablet  acetaminophen (Tylenol) tablet 325 mg  amLODIPine (Norvasc) tablet 10 mg  atorvastatin (Lipitor) tablet 80 mg  guaiFENesin (Robitussin) 100 mg/5 mL syrup 100 mg  mirtazapine (Remeron) tablet 15 mg  sertraline (Zoloft) tablet 25 mg  potassium chloride IV 20 mEq  oxygen (O2) therapy  ipratropium-albuteroL (Duo-Neb) 0.5-2.5 mg/3 mL nebulizer solution 3 mL  ferrous sulfate 325 (65 Fe) MG tablet 65 mg of iron  oxygen (O2) therapy  iron sucrose (Venofer) 200 mg in sodium chloride 0.9% 100 mL IVPB  melatonin tablet 5 mg  ipratropium-albuteroL (Duo-Neb) 0.5-2.5 mg/3 mL nebulizer solution 3 mL  enoxaparin (Lovenox) syringe 50 mg  piperacillin-tazobactam-dextrose (Zosyn) IV 4.5 g  enoxaparin (Lovenox) syringe 30 mg  piperacillin-tazobactam-dextrose (Zosyn) IV 3.375 g  vancomycin in dextrose 5 % (Vancocin) IVPB 1,000 mg  enoxaparin (Lovenox) syringe 30 mg  ipratropium-albuteroL (Duo-Neb) 0.5-2.5 mg/3 mL nebulizer solution 3 mL  OLANZapine (ZyPREXA) injection 5 mg  potassium chloride IV 20 mEq  potassium chloride (Klor-Con) packet 20 mEq      Relevant Results  Labs  Results from last 72 hours   Lab Units 10/04/23  0558 10/03/23  0528 10/02/23  1318 10/02/23  0680  10/02/23  0018   WBC AUTO x10*3/uL 13.6* 14.1* 7.0 7.5 13.1*   HEMOGLOBIN g/dL 8.5* 8.3* 8.9* 7.2* 6.0*   HEMATOCRIT % 27.5* 26.1* 28.9* 24.2* 20.1*   PLATELETS AUTO x10*3/uL 411 370 401 411 360   NEUTROS PCT AUTO %  --   --   --  92.7 79.9   LYMPHS PCT AUTO %  --   --   --  5.8 11.8   MONOS PCT AUTO %  --   --   --  0.7 5.7   EOS PCT AUTO %  --   --   --  0.1 1.8     Results from last 72 hours   Lab Units 10/04/23  0805 10/03/23  0528 10/02/23  0624   SODIUM mmol/L 139 138 139   POTASSIUM mmol/L 2.9* 3.7 3.4*   CHLORIDE mmol/L 104 105 104   CO2 mmol/L 24 23 22   BUN mg/dL 19 19 16   CREATININE mg/dL 0.83 0.98 0.82   GLUCOSE mg/dL 84 109* 156*   CALCIUM mg/dL 8.4* 8.6 8.5*   ANION GAP mmol/L 14 14 16   EGFR mL/min/1.73m*2 76 62 77   PHOSPHORUS mg/dL 2.6 3.5 3.0     Results from last 72 hours   Lab Units 10/04/23  0805 10/03/23  0528 10/02/23  0624 10/02/23  0018   ALK PHOS U/L  --   --  121 147*   BILIRUBIN TOTAL mg/dL  --   --  0.5 0.3   PROTEIN TOTAL g/dL  --   --  6.4 6.8   ALT U/L  --   --  18 18   AST U/L  --   --  27 37   ALBUMIN g/dL 3.6 3.6 3.8 4.1     Estimated Creatinine Clearance: 48.1 mL/min (by C-G formula based on SCr of 0.83 mg/dL).  C-Reactive Protein   Date Value Ref Range Status   10/03/2023 0.84 <1.00 mg/dL Final     Microbiology  Susceptibility data from last 14 days.  Collected Specimen Info Organism   10/02/23 Blood culture from Peripheral Venipuncture Staphylococcus epidermidis       Assessment/Plan      Bacteremia, CNS  Respiratory failure / atelectasis / possible pneumonia  Failure to thrive     Recommendations :  Continue Vancomycin    I spent  minutes in the professional and overall care of this patient.      Leslye Cortez MD

## 2023-10-04 NOTE — CARE PLAN
The patient's goals for the shift include more stamina    The clinical goals for the shift include decrease O2    Over the shift, the patient did not make progress toward the following goals. Barriers to progression include cognition. Recommendations to address these barriers include repition.

## 2023-10-04 NOTE — PROGRESS NOTES
Subjective   Interval History: @0600 this AM, patient screaming from room, confused. Says that she didn't know where she was. Refused duonebs. Given zyprexa with improvement in agitation. On encounter later this AM, patient back to baseline. Oriented x3. Says that she continues to feel well. Continues to endorse SOB and wheezing. Denies fevers, chills, CP, N/V/D.    Objective   Vital signs in last 24 hours:  Temp:  [36 °C (96.8 °F)-36.8 °C (98.2 °F)] 36 °C (96.8 °F)  Heart Rate:  [] 122  Resp:  [16-23] 23  BP: (100-119)/(68-98) 119/98  FiO2 (%):  [36 %] 36 %    Intake/Output last 3 shifts:  I/O last 3 completed shifts:  In: 440 (9.1 mL/kg) [P.O.:240; IV Piggyback:200]  Out: 1400 (29 mL/kg) [Urine:1400 (0.8 mL/kg/hr)]  Weight: 48.3 kg   Intake/Output this shift:  No intake/output data recorded.    Physical Exam  Constitutional:       General: She is not in acute distress.     Comments: Cachetic women   HENT:      Head: Normocephalic and atraumatic.      Mouth/Throat:      Mouth: Mucous membranes are moist.      Comments: Poor dentition  Eyes:      Extraocular Movements: Extraocular movements intact.      Conjunctiva/sclera: Conjunctivae normal.      Pupils: Pupils are equal, round, and reactive to light.   Cardiovascular:      Rate and Rhythm: Tachycardia present.   Pulmonary:      Comments: Diffuse wheezing improved from prior  Abdominal:      General: Abdomen is flat. Bowel sounds are normal.      Palpations: Abdomen is soft.   Musculoskeletal:         General: Normal range of motion.   Skin:     General: Skin is warm and dry.   Neurological:      General: No focal deficit present.      Mental Status: She is oriented to person, place, and time. Mental status is at baseline.   Psychiatric:         Mood and Affect: Mood normal.         Scheduled medications  [Held by provider] amLODIPine, 10 mg, oral, Daily  atorvastatin, 80 mg, oral, Nightly  enoxaparin, 30 mg, subcutaneous, q24h  [Held by provider] ferrous  sulfate, 65 mg of iron, oral, Daily with breakfast  fluticasone furoate-vilanteroL, 1 puff, inhalation, Daily  ipratropium-albuteroL, 3 mL, nebulization, 4x daily  melatonin, 5 mg, oral, Nightly  mirtazapine, 15 mg, oral, q24h  oxygen, , inhalation, Continuous - 02/gases  pantoprazole, 40 mg, intravenous, Daily  potassium chloride, 20 mEq, oral, BID  potassium chloride, 20 mEq, intravenous, q2h  predniSONE, 40 mg, oral, Daily  sertraline, 25 mg, oral, Daily  tiotropium, 2 puff, inhalation, Daily  vancomycin, 1,000 mg, intravenous, q24h      Continuous medications     PRN medications  PRN medications: acetaminophen **OR** acetaminophen **OR** acetaminophen, acetaminophen, guaiFENesin, ipratropium-albuteroL, ondansetron ODT **OR** ondansetron, oxygen     Results  Lab Results   Component Value Date    WBC 13.6 (H) 10/04/2023    HGB 8.5 (L) 10/04/2023    HCT 27.5 (L) 10/04/2023    MCV 76 (L) 10/04/2023     10/04/2023      Lab Results   Component Value Date    GLUCOSE 84 10/04/2023    CALCIUM 8.4 (L) 10/04/2023     10/04/2023    K 2.9 (LL) 10/04/2023    CO2 24 10/04/2023     10/04/2023    BUN 19 10/04/2023    CREATININE 0.83 10/04/2023    MG 1.80 10/04/2023      Assessment/Plan     Christie Arias is a 68 y.o. 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      Acute medical conditions:     #Acute hypoxic and hypercapneic respiratory failure likely 2/2 COPD exacerbation  #R/o pneumonia  Unlikely infection given no fever and interval improvement of LLL opacification/mucous plugging on CT PE  - S/p Vanc and Zosyn (10/1 - 10/2)  - S/p solumedrol in ED  - Blood cx 1/2 (10/2) positive for gram pos cocci in clusters, final results pending  - Procal 0.16  - CRP, ESR WNL  Plan  - Cont Prednisone 40mg (5days) (10/2 - current)  - Scheduled  Duonebs q4hrs and PRN, Spiriva, Breo  - Bronchial hygiene (acapella) and incentive spirometry  - Keep SPO2 above 88%, wean O2 as tolerated    #Positive blood cx, likely contaminant  - Blood cx 1/2 (10/2) positive for Staph epidermidis  - CRP, ESR WNL  - Repeat blood cx  - Consult ID, recs appreciated  - Cont vanc pending repeat cultures (10/3 - current)    #Leukocytosis  Likely 2/2 prednisone  - not present on admission until starting steroids  - no clinical signs of infection - no fever, rigors, new hypotension     #Acute on chronic anemia likely 2/2 JIE, stable  Less likely UGIB given no clinical signs or MDS given only RBCs affected  - Hgb on admission: 6.0 (baseline 9.0 to 7.8), MCV 74 with FOBT positive   - Iron studies (10/2/2022): iron 18, % sat 4, TIBC 418, ferritin 19  - EGD (11/2022) unremarkable; Colonoscopy (11/2022): diverticulosis in sigmoid colon   - Normal BUN:Cr ratio  - s/p 2 units of blood transfusion, venofer 10/2  - Cont IV protonix 40mg daily  - Hold PO iron supplementation per GI recs as it may produce black stools  - Plan for OP heme onc follow-up     #Hypokalemia  Likely 2/2 duonebs vs. Poor PO intake  - Replete    #Non-MI Troponin elevation likely 2/2 demand ischemia  - EKG on admission: Sinus tachycardia with no ST or T wave morphologies  - Echo (07/2023): Diastolic dysfunction with LVEF 60%  - Trop: 2074->1201  - Pt on telemetry  - Monitor Hgb and transfuse if < 7.0     #Failure to thrive  - consult nutrition     Chronic medical conditions  #HTN: hold home amlodipine 10mg given soft BPs  #MDD: cont zoloft and remeron     F: PO  E: replete as needed  N: regular diet  GI ppx: Protonix  DVT ppx: lovenox  Abx: Vanc (10/3- current)     Dispo: Admitted for COPD exacerbation and anemia s/p transfusion. Est LOS > 48h.    Principal Problem:    Respiratory failure (CMS/HCC)  Active Problems:    Severe protein-calorie malnutrition (CMS/HCC)    Iron deficiency anemia      Delilah Sandoval MD

## 2023-10-05 ENCOUNTER — APPOINTMENT (OUTPATIENT)
Dept: CARDIOLOGY | Facility: HOSPITAL | Age: 68
DRG: 871 | End: 2023-10-05
Payer: MEDICAID

## 2023-10-05 PROBLEM — R62.7 FAILURE TO THRIVE IN ADULT: Status: ACTIVE | Noted: 2023-10-05

## 2023-10-05 PROBLEM — J44.9 CHRONIC OBSTRUCTIVE PULMONARY DISEASE (MULTI): Status: ACTIVE | Noted: 2022-07-30

## 2023-10-05 PROBLEM — E78.5 HYPERLIPIDEMIA: Status: ACTIVE | Noted: 2022-07-30

## 2023-10-05 LAB
ALBUMIN SERPL BCP-MCNC: 3.3 G/DL (ref 3.4–5)
ALBUMIN SERPL BCP-MCNC: 3.6 G/DL (ref 3.4–5)
ANION GAP SERPL CALC-SCNC: 13 MMOL/L (ref 10–20)
ANION GAP SERPL CALC-SCNC: 14 MMOL/L (ref 10–20)
ATRIAL RATE: 141 BPM
BUN SERPL-MCNC: 17 MG/DL (ref 6–23)
BUN SERPL-MCNC: 17 MG/DL (ref 6–23)
CALCIUM SERPL-MCNC: 8.1 MG/DL (ref 8.6–10.3)
CALCIUM SERPL-MCNC: 8.4 MG/DL (ref 8.6–10.3)
CARDIAC TROPONIN I PNL SERPL HS: 870 NG/L (ref 0–13)
CHLORIDE SERPL-SCNC: 102 MMOL/L (ref 98–107)
CHLORIDE SERPL-SCNC: 105 MMOL/L (ref 98–107)
CO2 SERPL-SCNC: 23 MMOL/L (ref 21–32)
CO2 SERPL-SCNC: 25 MMOL/L (ref 21–32)
CREAT SERPL-MCNC: 0.82 MG/DL (ref 0.5–1.05)
CREAT SERPL-MCNC: 0.85 MG/DL (ref 0.5–1.05)
ERYTHROCYTE [DISTWIDTH] IN BLOOD BY AUTOMATED COUNT: 19.8 % (ref 11.5–14.5)
GFR SERPL CREATININE-BSD FRML MDRD: 74 ML/MIN/1.73M*2
GFR SERPL CREATININE-BSD FRML MDRD: 77 ML/MIN/1.73M*2
GLUCOSE BLD MANUAL STRIP-MCNC: 134 MG/DL (ref 74–99)
GLUCOSE SERPL-MCNC: 103 MG/DL (ref 74–99)
GLUCOSE SERPL-MCNC: 83 MG/DL (ref 74–99)
HCT VFR BLD AUTO: 27.3 % (ref 36–46)
HGB BLD-MCNC: 8.3 G/DL (ref 12–16)
MAGNESIUM SERPL-MCNC: 1.72 MG/DL (ref 1.6–2.4)
MAGNESIUM SERPL-MCNC: 1.83 MG/DL (ref 1.6–2.4)
MCH RBC QN AUTO: 23.5 PG (ref 26–34)
MCHC RBC AUTO-ENTMCNC: 30.4 G/DL (ref 32–36)
MCV RBC AUTO: 77 FL (ref 80–100)
NRBC BLD-RTO: 0.3 /100 WBCS (ref 0–0)
P AXIS: 76 DEGREES
P OFFSET: 196 MS
P ONSET: 152 MS
PHOSPHATE SERPL-MCNC: 2.8 MG/DL (ref 2.5–4.9)
PHOSPHATE SERPL-MCNC: 3.2 MG/DL (ref 2.5–4.9)
PLATELET # BLD AUTO: 380 X10*3/UL (ref 150–450)
PMV BLD AUTO: 11.3 FL (ref 7.5–11.5)
POTASSIUM SERPL-SCNC: 3.1 MMOL/L (ref 3.5–5.3)
POTASSIUM SERPL-SCNC: 3.5 MMOL/L (ref 3.5–5.3)
PR INTERVAL: 130 MS
Q ONSET: 217 MS
QRS COUNT: 24 BEATS
QRS DURATION: 98 MS
QT INTERVAL: 298 MS
QTC CALCULATION(BAZETT): 456 MS
QTC FREDERICIA: 396 MS
R AXIS: 71 DEGREES
RBC # BLD AUTO: 3.53 X10*6/UL (ref 4–5.2)
SODIUM SERPL-SCNC: 136 MMOL/L (ref 136–145)
SODIUM SERPL-SCNC: 139 MMOL/L (ref 136–145)
T AXIS: -33 DEGREES
T OFFSET: 366 MS
VENTRICULAR RATE: 141 BPM
WBC # BLD AUTO: 10.6 X10*3/UL (ref 4.4–11.3)

## 2023-10-05 PROCEDURE — 36415 COLL VENOUS BLD VENIPUNCTURE: CPT

## 2023-10-05 PROCEDURE — 80069 RENAL FUNCTION PANEL: CPT

## 2023-10-05 PROCEDURE — 96372 THER/PROPH/DIAG INJ SC/IM: CPT

## 2023-10-05 PROCEDURE — 94640 AIRWAY INHALATION TREATMENT: CPT

## 2023-10-05 PROCEDURE — S0166 INJ OLANZAPINE 2.5MG: HCPCS

## 2023-10-05 PROCEDURE — 2500000001 HC RX 250 WO HCPCS SELF ADMINISTERED DRUGS (ALT 637 FOR MEDICARE OP)

## 2023-10-05 PROCEDURE — 2500000005 HC RX 250 GENERAL PHARMACY W/O HCPCS: Performed by: INTERNAL MEDICINE

## 2023-10-05 PROCEDURE — 2500000004 HC RX 250 GENERAL PHARMACY W/ HCPCS (ALT 636 FOR OP/ED)

## 2023-10-05 PROCEDURE — 83735 ASSAY OF MAGNESIUM: CPT

## 2023-10-05 PROCEDURE — 82947 ASSAY GLUCOSE BLOOD QUANT: CPT

## 2023-10-05 PROCEDURE — 85027 COMPLETE CBC AUTOMATED: CPT

## 2023-10-05 PROCEDURE — 93005 ELECTROCARDIOGRAM TRACING: CPT

## 2023-10-05 PROCEDURE — 84484 ASSAY OF TROPONIN QUANT: CPT

## 2023-10-05 PROCEDURE — 2500000002 HC RX 250 W HCPCS SELF ADMINISTERED DRUGS (ALT 637 FOR MEDICARE OP, ALT 636 FOR OP/ED): Performed by: INTERNAL MEDICINE

## 2023-10-05 PROCEDURE — 2060000001 HC INTERMEDIATE ICU ROOM DAILY

## 2023-10-05 PROCEDURE — 2500000005 HC RX 250 GENERAL PHARMACY W/O HCPCS

## 2023-10-05 PROCEDURE — 99232 SBSQ HOSP IP/OBS MODERATE 35: CPT

## 2023-10-05 PROCEDURE — C9113 INJ PANTOPRAZOLE SODIUM, VIA: HCPCS

## 2023-10-05 PROCEDURE — 94660 CPAP INITIATION&MGMT: CPT

## 2023-10-05 RX ORDER — OLANZAPINE 10 MG/2ML
5 INJECTION, POWDER, FOR SOLUTION INTRAMUSCULAR ONCE
Status: COMPLETED | OUTPATIENT
Start: 2023-10-05 | End: 2023-10-05

## 2023-10-05 RX ORDER — DOCUSATE SODIUM 100 MG/1
100 CAPSULE, LIQUID FILLED ORAL 2 TIMES DAILY PRN
Status: DISCONTINUED | OUTPATIENT
Start: 2023-10-05 | End: 2023-10-12 | Stop reason: HOSPADM

## 2023-10-05 RX ORDER — PANTOPRAZOLE SODIUM 40 MG/1
40 TABLET, DELAYED RELEASE ORAL
Status: DISCONTINUED | OUTPATIENT
Start: 2023-10-06 | End: 2023-10-12 | Stop reason: HOSPADM

## 2023-10-05 RX ORDER — POTASSIUM CHLORIDE 14.9 MG/ML
20 INJECTION INTRAVENOUS
Status: COMPLETED | OUTPATIENT
Start: 2023-10-05 | End: 2023-10-05

## 2023-10-05 RX ORDER — QUETIAPINE FUMARATE 25 MG/1
12.5 TABLET, FILM COATED ORAL NIGHTLY
Status: DISCONTINUED | OUTPATIENT
Start: 2023-10-05 | End: 2023-10-12 | Stop reason: HOSPADM

## 2023-10-05 RX ORDER — POLYETHYLENE GLYCOL 3350 17 G/17G
17 POWDER, FOR SOLUTION ORAL DAILY PRN
Status: DISCONTINUED | OUTPATIENT
Start: 2023-10-05 | End: 2023-10-12 | Stop reason: HOSPADM

## 2023-10-05 RX ORDER — METOPROLOL TARTRATE 1 MG/ML
5 INJECTION, SOLUTION INTRAVENOUS ONCE
Status: COMPLETED | OUTPATIENT
Start: 2023-10-05 | End: 2023-10-05

## 2023-10-05 RX ORDER — LANOLIN ALCOHOL/MO/W.PET/CERES
400 CREAM (GRAM) TOPICAL ONCE
Status: COMPLETED | OUTPATIENT
Start: 2023-10-05 | End: 2023-10-05

## 2023-10-05 RX ORDER — METOPROLOL TARTRATE 1 MG/ML
INJECTION, SOLUTION INTRAVENOUS
Status: COMPLETED
Start: 2023-10-05 | End: 2023-10-05

## 2023-10-05 RX ADMIN — METOPROLOL TARTRATE 5 MG: 1 INJECTION, SOLUTION INTRAVENOUS at 02:15

## 2023-10-05 RX ADMIN — TIOTROPIUM BROMIDE INHALATION SPRAY 2 PUFF: 3.12 SPRAY, METERED RESPIRATORY (INHALATION) at 06:18

## 2023-10-05 RX ADMIN — IPRATROPIUM BROMIDE AND ALBUTEROL SULFATE 3 ML: 2.5; .5 SOLUTION RESPIRATORY (INHALATION) at 20:03

## 2023-10-05 RX ADMIN — PANTOPRAZOLE SODIUM 40 MG: 40 INJECTION, POWDER, FOR SOLUTION INTRAVENOUS at 08:34

## 2023-10-05 RX ADMIN — QUETIAPINE FUMARATE 12.5 MG: 25 TABLET ORAL at 21:11

## 2023-10-05 RX ADMIN — POTASSIUM CHLORIDE 20 MEQ: 1.5 FOR SOLUTION ORAL at 08:33

## 2023-10-05 RX ADMIN — FLUTICASONE FUROATE AND VILANTEROL TRIFENATATE 1 PUFF: 100; 25 POWDER RESPIRATORY (INHALATION) at 08:38

## 2023-10-05 RX ADMIN — Medication 3 L/MIN: at 08:00

## 2023-10-05 RX ADMIN — ATORVASTATIN CALCIUM 80 MG: 80 TABLET, FILM COATED ORAL at 21:12

## 2023-10-05 RX ADMIN — POTASSIUM CHLORIDE 20 MEQ: 14.9 INJECTION, SOLUTION INTRAVENOUS at 06:15

## 2023-10-05 RX ADMIN — ENOXAPARIN SODIUM 30 MG: 30 INJECTION SUBCUTANEOUS at 13:13

## 2023-10-05 RX ADMIN — IPRATROPIUM BROMIDE AND ALBUTEROL SULFATE 3 ML: 2.5; .5 SOLUTION RESPIRATORY (INHALATION) at 08:55

## 2023-10-05 RX ADMIN — POTASSIUM CHLORIDE 20 MEQ: 14.9 INJECTION, SOLUTION INTRAVENOUS at 04:35

## 2023-10-05 RX ADMIN — PREDNISONE 40 MG: 20 TABLET ORAL at 08:32

## 2023-10-05 RX ADMIN — IPRATROPIUM BROMIDE AND ALBUTEROL SULFATE 3 ML: 2.5; .5 SOLUTION RESPIRATORY (INHALATION) at 14:40

## 2023-10-05 RX ADMIN — POTASSIUM CHLORIDE 20 MEQ: 1.5 FOR SOLUTION ORAL at 21:00

## 2023-10-05 RX ADMIN — MIRTAZAPINE 15 MG: 15 TABLET, FILM COATED ORAL at 21:12

## 2023-10-05 RX ADMIN — Medication 5 MG: at 21:12

## 2023-10-05 RX ADMIN — Medication 400 MG: at 08:32

## 2023-10-05 RX ADMIN — SERTRALINE HYDROCHLORIDE 25 MG: 50 TABLET ORAL at 08:32

## 2023-10-05 RX ADMIN — OLANZAPINE 5 MG: 10 INJECTION, POWDER, FOR SOLUTION INTRAMUSCULAR at 09:30

## 2023-10-05 RX ADMIN — METOPROLOL TARTRATE 5 MG: 5 INJECTION INTRAVENOUS at 02:15

## 2023-10-05 ASSESSMENT — COGNITIVE AND FUNCTIONAL STATUS - GENERAL
STANDING UP FROM CHAIR USING ARMS: A LOT
TURNING FROM BACK TO SIDE WHILE IN FLAT BAD: A LITTLE
DRESSING REGULAR UPPER BODY CLOTHING: A LOT
PERSONAL GROOMING: A LITTLE
WALKING IN HOSPITAL ROOM: A LOT
CLIMB 3 TO 5 STEPS WITH RAILING: TOTAL
MOVING TO AND FROM BED TO CHAIR: A LOT
EATING MEALS: A LITTLE
HELP NEEDED FOR BATHING: A LOT
WALKING IN HOSPITAL ROOM: A LOT
DAILY ACTIVITIY SCORE: 18
MOVING TO AND FROM BED TO CHAIR: A LITTLE
DRESSING REGULAR LOWER BODY CLOTHING: A LITTLE
DRESSING REGULAR LOWER BODY CLOTHING: A LOT
DAILY ACTIVITIY SCORE: 15
MOVING FROM LYING ON BACK TO SITTING ON SIDE OF FLAT BED WITH BEDRAILS: A LITTLE
TURNING FROM BACK TO SIDE WHILE IN FLAT BAD: A LITTLE
STANDING UP FROM CHAIR USING ARMS: A LOT
DRESSING REGULAR UPPER BODY CLOTHING: A LITTLE
TOILETING: A LOT
HELP NEEDED FOR BATHING: A LITTLE
PERSONAL GROOMING: A LITTLE
MOVING FROM LYING ON BACK TO SITTING ON SIDE OF FLAT BED WITH BEDRAILS: A LITTLE
TOILETING: A LITTLE
MOBILITY SCORE: 14
MOBILITY SCORE: 13
CLIMB 3 TO 5 STEPS WITH RAILING: TOTAL

## 2023-10-05 NOTE — CARE PLAN
Rapid response note:    Rapid response was called at 2am on (10/5/23) as patient's HR was noted to be tachycardic high in 160s and had increased work of breathing. Per RN, she desatted to 88% and was placed on NRB. Dr. Gray, residents and staff were at bedside. Vitals (2:05am) /129, , RR 30, SPO2 100% on NRB. Finger stick . EKG was obtained which showed Sinus tachycardia with  and Qtc 456. Patient was placed on BiPAP 15/5 for increased work of breathing and saturated 100%. Following Labs were drawn (RFP, Mg and Troponin). Patient was soon weaned to 3L NC by 2:35am. Code status: DNR-DNI

## 2023-10-05 NOTE — PROGRESS NOTES
Subjective   Interval History: Overnight, rapid response called for patient's increased SOB and tachycardia. Patient briefly on Bipap, then weaned to 3L NC in 30 minutes. See note by Dr. Yaz Bisaws fore more details.    Around 0900, patient with recurrence of anxiety, tachypnea, tachycardia, and SOB occuring right after receiving duonebs. Patient says that the anxiety and shortness of breath occurred out of nowhere. At the time, , /135, RR 28, 100% on non-rebreather. Symptoms quickly resolved after receiving zyprexa 5mg IM. Weaned to 3L NC.        Objective   Vital signs in last 24 hours:  Temp:  [36.3 °C (97.3 °F)-37.3 °C (99.1 °F)] 36.3 °C (97.3 °F)  Heart Rate:  [] 95  Resp:  [18-30] 26  BP: (100-154)/() 126/96  FiO2 (%):  [40 %] 40 %    Intake/Output last 3 shifts:  I/O last 3 completed shifts:  In: 540 (11.2 mL/kg) [P.O.:240; IV Piggyback:300]  Out: 2400 (49.7 mL/kg) [Urine:2000 (1.2 mL/kg/hr); Other:400]  Weight: 48.3 kg   Intake/Output this shift:  I/O this shift:  In: 240 [P.O.:240]  Out: 500 [Urine:500]    Physical Exam  Constitutional:       Comments: cachectic   HENT:      Head: Normocephalic and atraumatic.      Mouth/Throat:      Mouth: Mucous membranes are moist.      Pharynx: Oropharynx is clear.   Eyes:      Pupils: Pupils are equal, round, and reactive to light.   Cardiovascular:      Rate and Rhythm: Regular rhythm. Tachycardia present.      Heart sounds: Normal heart sounds.   Pulmonary:      Effort: Pulmonary effort is normal.      Breath sounds: Wheezing present.   Abdominal:      General: Abdomen is flat. Bowel sounds are normal.      Palpations: Abdomen is soft.   Musculoskeletal:      Cervical back: Normal range of motion.   Neurological:      Mental Status: She is alert.   Psychiatric:      Comments: Anxious         Scheduled medications  [Held by provider] amLODIPine, 10 mg, oral, Daily  atorvastatin, 80 mg, oral, Nightly  enoxaparin, 30 mg, subcutaneous,  q24h  [Held by provider] ferrous sulfate, 65 mg of iron, oral, Daily with breakfast  fluticasone furoate-vilanteroL, 1 puff, inhalation, Daily  ipratropium-albuteroL, 3 mL, nebulization, 4x daily  melatonin, 5 mg, oral, Nightly  mirtazapine, 15 mg, oral, q24h  oxygen, , inhalation, Continuous - 02/gases  pantoprazole, 40 mg, intravenous, Daily  potassium chloride, 20 mEq, oral, BID  predniSONE, 40 mg, oral, Daily  QUEtiapine, 12.5 mg, oral, Nightly  sertraline, 25 mg, oral, Daily  tiotropium, 2 puff, inhalation, Daily  vancomycin, 1,000 mg, intravenous, q24h      Continuous medications     PRN medications  PRN medications: acetaminophen, acetaminophen **OR** [DISCONTINUED] acetaminophen **OR** [DISCONTINUED] acetaminophen, docusate sodium, guaiFENesin, ipratropium-albuteroL, ondansetron ODT **OR** [DISCONTINUED] ondansetron, oxygen, polyethylene glycol     Results  Lab Results   Component Value Date    WBC 10.6 10/05/2023    HGB 8.3 (L) 10/05/2023    HCT 27.3 (L) 10/05/2023    MCV 77 (L) 10/05/2023     10/05/2023      Lab Results   Component Value Date    GLUCOSE 83 10/05/2023    CALCIUM 8.1 (L) 10/05/2023     10/05/2023    K 3.5 10/05/2023    CO2 25 10/05/2023     10/05/2023    BUN 17 10/05/2023    CREATININE 0.85 10/05/2023    MG 1.72 10/05/2023      Assessment/Plan     Christie Arias is a 68 y.o. 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      Acute medical conditions:     #Acute hypoxic and hypercapneic respiratory failure likely 2/2 COPD exacerbation  #R/o pneumonia  Unlikely infection given no fever and interval improvement of LLL opacification/mucous plugging on CT PE  - S/p Vanc and Zosyn (10/1 - 10/2)  - S/p solumedrol in ED  - Blood cx 1/2 (10/2) positive for gram pos cocci in clusters, final results pending  -  Procal 0.16  - CRP, ESR WNL  Plan  - Cont Prednisone 40mg (5days) (10/2 - 10/6)  - Scheduled Duonebs q4hrs and PRN, Spiriva, Breo  - Bronchial hygiene (acapella) and incentive spirometry  - Keep SPO2 above 88%, wean O2 as tolerated    #Recurrent episodes of tachycardia, tachypnea, anxiety  C/f panic attacks  - Less likely pulmonary pathology given patient's sats normalize quickly after she calms  - No apparent triggers, episodes can occur without duonebs prior  - Start seroquel 12.5mg at bedtime     #Positive blood cx, likely contaminant  - Blood cx 1/2 sets (10/2) positive for Staph epidermidis  - CRP, ESR WNL  - Repeat blood cx (10/3) no growth to date  - Consult ID, recs appreciated  - Cont vanc pending repeat cultures (10/3 - current)     #Acute on chronic anemia likely 2/2 JIE, stable  Less likely UGIB given no clinical signs or MDS given only RBCs affected  - Hgb on admission: 6.0 (baseline 9.0 to 7.8), MCV 74 with FOBT positive   - Iron studies (10/2/2022): iron 18, % sat 4, TIBC 418, ferritin 19  - EGD (11/2022) unremarkable; Colonoscopy (11/2022): diverticulosis in sigmoid colon   - Normal BUN:Cr ratio  - s/p 2 units of blood transfusion, venofer 10/2  - Cont IV protonix 40mg daily  - Hold PO iron supplementation per GI recs as it may produce black stools  - Plan for OP heme onc follow-up     #Non-MI Troponin elevation likely 2/2 demand ischemia  - EKG on admission: Sinus tachycardia with no ST or T wave morphologies  - Echo (07/2023): Diastolic dysfunction with LVEF 60%  - Trop: 2074->1201  - Pt on telemetry  - Monitor Hgb and transfuse if < 7.0     #Failure to thrive  - consult nutrition     Chronic medical conditions  #HTN: hold home amlodipine 10mg given soft BPs  #MDD: cont zoloft and remeron     F: PO  E: replete as needed  N: regular diet  GI ppx: Protonix  DVT ppx: lovenox  Abx: Vanc (10/3- current)     Dispo: Admitted for COPD exacerbation and anemia s/p transfusion. Est LOS > 48h.    Principal  Problem:    Respiratory failure (CMS/HCC)  Active Problems:    Severe protein-calorie malnutrition (CMS/HCC)    Iron deficiency anemia      Delilah Sandoval MD

## 2023-10-05 NOTE — CONSULTS
Vancomycin Dosing by Pharmacy- Cessation of Therapy    Consult to pharmacy for vancomycin dosing has been discontinued by the prescriber, pharmacy will sign off at this time.    Please call pharmacy if there are further questions or re-enter a consult if vancomycin is resumed.     Amado Campbell, PharmD

## 2023-10-05 NOTE — CARE PLAN
The patient's goals for the shift include more stamina    The clinical goals for the shift include decrease O2

## 2023-10-05 NOTE — PROGRESS NOTES
Physical Therapy                 Therapy Communication Note    Patient Name: Christie Arias  MRN: [id number]  Today's Date: 10/5/2023     Discipline: Physical Therapy    Missed Visit Reason: Missed Visit Reason: Patient sleeping (Per nursing recently provided with zyprexa; patient sleeping comfortable, able to aroused but unable to successfully engage in therapy)    Missed Time: Attempt    Comment:

## 2023-10-05 NOTE — PROGRESS NOTES
Christie Arias is a 68 y.o. female on day 3 of admission presenting with Respiratory failure (CMS/HCC).    Subjective   Interval History: no fever, had sob overnight        Review of Systems    Objective   Range of Vitals (last 24 hours)  Heart Rate:  []   Temp:  [36.3 °C (97.3 °F)-37.3 °C (99.1 °F)]   Resp:  [18-30]   BP: (100-154)/()   SpO2:  [93 %-100 %]   Daily Weight  10/02/23 : 48.3 kg (106 lb 7.7 oz)    Body mass index is 15.72 kg/m².    Physical Exam  Constitutional:       Appearance: Normal appearance.   HENT:      Head: Normocephalic and atraumatic.      Mouth/Throat:      Mouth: Mucous membranes are moist.      Pharynx: Oropharynx is clear.   Eyes:      Pupils: Pupils are equal, round, and reactive to light.   Cardiovascular:      Rate and Rhythm: Normal rate and regular rhythm.      Heart sounds: Normal heart sounds.   Pulmonary:      Effort: Pulmonary effort is normal.      Breath sounds: Normal breath sounds.   Abdominal:      General: Abdomen is flat. Bowel sounds are normal.      Palpations: Abdomen is soft.   Musculoskeletal:      Cervical back: Normal range of motion.   Neurological:      Mental Status: She is alert.         Antibiotics  sodium chloride 0.9 % bolus 500 mL  methylPREDNISolone sod succinate (PF) (SOLU-Medrol) injection 125 mg  piperacillin-tazobactam-dextrose (Zosyn) IV 4.5 g  vancomycin in dextrose 5 % (Vancocin) IVPB 1 g  oxygen (O2) therapy  sodium chloride 0.9 % bolus 1,572 mL  iohexol (OMNIPaque) 350 mg iodine/mL injection 100 mL  oxygen (O2) therapy  ondansetron ODT (Zofran-ODT) disintegrating tablet 4 mg  ondansetron (Zofran) injection 4 mg  acetaminophen (Tylenol) tablet 650 mg  acetaminophen (Tylenol) oral liquid 650 mg  acetaminophen (Tylenol) suppository 650 mg  pantoprazole (ProtoNix) injection 80 mg  pantoprazole (ProtoNix) injection 40 mg  ipratropium-albuteroL (Duo-Neb) 0.5-2.5 mg/3 mL nebulizer solution 3 mL  piperacillin-tazobactam-dextrose (Zosyn)  IV 3.375 g  predniSONE (Deltasone) tablet 40 mg  vancomycin in dextrose 5 % (Vancocin) IVPB 500 mg  ipratropium-albuteroL (Duo-Neb) 0.5-2.5 mg/3 mL nebulizer solution 3 mL  ipratropium-albuteroL (Duo-Neb) 0.5-2.5 mg/3 mL nebulizer solution 3 mL  ipratropium-albuteroL (Duo-Neb) 0.5-2.5 mg/3 mL nebulizer solution 3 mL  tiotropium (Spiriva Respimat) 2.5 mcg/actuation inhaler 2 puff  fluticasone furoate-vilanteroL (Breo Ellipta) 100-25 mcg/dose inhaler 1 puff  amLODIPine (Norvasc) tablet  aspirin EC tablet      traMADol (Ultram) tablet  pantoprazole (ProtoNix) EC tablet  mirtazapine (Remeron) tablet      nitroglycerin (Nitrostat) SL tablet  potassium chloride SA (Klor-Con M20) ER tablet  sertraline (Zoloft) tablet  acetaminophen (Tylenol) tablet  ondansetron (Zofran) tablet  acetaminophen (Tylenol) tablet 325 mg  amLODIPine (Norvasc) tablet 10 mg  atorvastatin (Lipitor) tablet 80 mg  guaiFENesin (Robitussin) 100 mg/5 mL syrup 100 mg  mirtazapine (Remeron) tablet 15 mg  sertraline (Zoloft) tablet 25 mg  potassium chloride IV 20 mEq  oxygen (O2) therapy  ipratropium-albuteroL (Duo-Neb) 0.5-2.5 mg/3 mL nebulizer solution 3 mL  ferrous sulfate 325 (65 Fe) MG tablet 65 mg of iron  oxygen (O2) therapy  iron sucrose (Venofer) 200 mg in sodium chloride 0.9% 100 mL IVPB  melatonin tablet 5 mg  ipratropium-albuteroL (Duo-Neb) 0.5-2.5 mg/3 mL nebulizer solution 3 mL  enoxaparin (Lovenox) syringe 50 mg  piperacillin-tazobactam-dextrose (Zosyn) IV 4.5 g  enoxaparin (Lovenox) syringe 30 mg  piperacillin-tazobactam-dextrose (Zosyn) IV 3.375 g  vancomycin in dextrose 5 % (Vancocin) IVPB 1,000 mg  enoxaparin (Lovenox) syringe 30 mg  ipratropium-albuteroL (Duo-Neb) 0.5-2.5 mg/3 mL nebulizer solution 3 mL  OLANZapine (ZyPREXA) injection 5 mg  potassium chloride IV 20 mEq  potassium chloride (Klor-Con) packet 20 mEq  metoprolol tartrate (Lopressor) 5 mg/5 mL injection  - Omnicell Override Pull  metoprolol tartrate (Lopressor) injection 5  mg  oxygen (O2) therapy  polyethylene glycol (Glycolax, Miralax) packet 17 g  docusate sodium (Colace) capsule 100 mg  potassium chloride IV 20 mEq  magnesium oxide (Mag-Ox) tablet 400 mg  OLANZapine (ZyPREXA) injection 5 mg  QUEtiapine (SEROquel) tablet 12.5 mg      Relevant Results  Labs  Results from last 72 hours   Lab Units 10/05/23  0603 10/04/23  0558 10/03/23  0528   WBC AUTO x10*3/uL 10.6 13.6* 14.1*   HEMOGLOBIN g/dL 8.3* 8.5* 8.3*   HEMATOCRIT % 27.3* 27.5* 26.1*   PLATELETS AUTO x10*3/uL 380 411 370     Results from last 72 hours   Lab Units 10/05/23  0602 10/05/23  0247 10/04/23  0805   SODIUM mmol/L 139 136 139   POTASSIUM mmol/L 3.5 3.1* 2.9*   CHLORIDE mmol/L 105 102 104   CO2 mmol/L 25 23 24   BUN mg/dL 17 17 19   CREATININE mg/dL 0.85 0.82 0.83   GLUCOSE mg/dL 83 103* 84   CALCIUM mg/dL 8.1* 8.4* 8.4*   ANION GAP mmol/L 13 14 14   EGFR mL/min/1.73m*2 74 77 76   PHOSPHORUS mg/dL 2.8 3.2 2.6     Results from last 72 hours   Lab Units 10/05/23  0602 10/05/23  0247 10/04/23  0805   ALBUMIN g/dL 3.3* 3.6 3.6     Estimated Creatinine Clearance: 47 mL/min (by C-G formula based on SCr of 0.85 mg/dL).  C-Reactive Protein   Date Value Ref Range Status   10/03/2023 0.84 <1.00 mg/dL Final     Microbiology  Susceptibility data from last 14 days.  Collected Specimen Info Organism   10/02/23 Blood culture from Peripheral Venipuncture Staphylococcus epidermidis        Assessment/Plan      Bacteremia, CNS, the repeat BC is negative  Respiratory failure / atelectasis / possible pneumonia  Failure to thrive     Recommendations :  Can discontinue Vancomycin  Chest PT     I spent  minutes in the professional and overall care of this patient.      Leslye Cortez MD

## 2023-10-05 NOTE — NURSING NOTE
BiPAP made PRN for work of breathing per Resident.     At 0215 rapid response called. Patient put on BiPAP for increased work of breathing.    0235 at bedside to re-evaluate patient, patient found on 3L nasal cannula by nursing.

## 2023-10-05 NOTE — NURSING NOTE
Patient was yelling out. Entered room to see heart rate in the 160's. Patient instructed to bear down with no success. Patient having increased work of breathing, and anxiety. Nonrebreather and then bipap placed. Rapid response called as patients heart rate was hitting the 200's. EKG preformed. Vitals taken. 5mg IV lopressor given. Patient currently back on 2LN at 93% and heart rate SR 83.

## 2023-10-06 ENCOUNTER — APPOINTMENT (OUTPATIENT)
Dept: RADIOLOGY | Facility: HOSPITAL | Age: 68
DRG: 871 | End: 2023-10-06
Payer: MEDICAID

## 2023-10-06 LAB
ALBUMIN SERPL BCP-MCNC: 3.3 G/DL (ref 3.4–5)
ANION GAP SERPL CALC-SCNC: 14 MMOL/L (ref 10–20)
BACTERIA BLD CULT: NORMAL
BUN SERPL-MCNC: 23 MG/DL (ref 6–23)
CALCIUM SERPL-MCNC: 8.3 MG/DL (ref 8.6–10.3)
CHLORIDE SERPL-SCNC: 104 MMOL/L (ref 98–107)
CO2 SERPL-SCNC: 24 MMOL/L (ref 21–32)
CREAT SERPL-MCNC: 1.06 MG/DL (ref 0.5–1.05)
ERYTHROCYTE [DISTWIDTH] IN BLOOD BY AUTOMATED COUNT: 21.6 % (ref 11.5–14.5)
GFR SERPL CREATININE-BSD FRML MDRD: 57 ML/MIN/1.73M*2
GLUCOSE SERPL-MCNC: 89 MG/DL (ref 74–99)
HCT VFR BLD AUTO: 28.9 % (ref 36–46)
HGB BLD-MCNC: 8.7 G/DL (ref 12–16)
MAGNESIUM SERPL-MCNC: 1.86 MG/DL (ref 1.6–2.4)
MCH RBC QN AUTO: 24 PG (ref 26–34)
MCHC RBC AUTO-ENTMCNC: 30.1 G/DL (ref 32–36)
MCV RBC AUTO: 80 FL (ref 80–100)
NRBC BLD-RTO: 0 /100 WBCS (ref 0–0)
PHOSPHATE SERPL-MCNC: 3.4 MG/DL (ref 2.5–4.9)
PLATELET # BLD AUTO: 350 X10*3/UL (ref 150–450)
PMV BLD AUTO: 11 FL (ref 7.5–11.5)
POTASSIUM SERPL-SCNC: 3.3 MMOL/L (ref 3.5–5.3)
RBC # BLD AUTO: 3.62 X10*6/UL (ref 4–5.2)
SODIUM SERPL-SCNC: 139 MMOL/L (ref 136–145)
WBC # BLD AUTO: 9.4 X10*3/UL (ref 4.4–11.3)

## 2023-10-06 PROCEDURE — 2500000004 HC RX 250 GENERAL PHARMACY W/ HCPCS (ALT 636 FOR OP/ED)

## 2023-10-06 PROCEDURE — 94660 CPAP INITIATION&MGMT: CPT

## 2023-10-06 PROCEDURE — 36415 COLL VENOUS BLD VENIPUNCTURE: CPT

## 2023-10-06 PROCEDURE — S0166 INJ OLANZAPINE 2.5MG: HCPCS

## 2023-10-06 PROCEDURE — 80069 RENAL FUNCTION PANEL: CPT

## 2023-10-06 PROCEDURE — 2500000005 HC RX 250 GENERAL PHARMACY W/O HCPCS

## 2023-10-06 PROCEDURE — 93010 ELECTROCARDIOGRAM REPORT: CPT | Performed by: INTERNAL MEDICINE

## 2023-10-06 PROCEDURE — 71045 X-RAY EXAM CHEST 1 VIEW: CPT

## 2023-10-06 PROCEDURE — 2500000001 HC RX 250 WO HCPCS SELF ADMINISTERED DRUGS (ALT 637 FOR MEDICARE OP)

## 2023-10-06 PROCEDURE — 71045 X-RAY EXAM CHEST 1 VIEW: CPT | Performed by: RADIOLOGY

## 2023-10-06 PROCEDURE — S0119 ONDANSETRON 4 MG: HCPCS

## 2023-10-06 PROCEDURE — 2500000002 HC RX 250 W HCPCS SELF ADMINISTERED DRUGS (ALT 637 FOR MEDICARE OP, ALT 636 FOR OP/ED): Performed by: INTERNAL MEDICINE

## 2023-10-06 PROCEDURE — 92610 EVALUATE SWALLOWING FUNCTION: CPT | Mod: GN

## 2023-10-06 PROCEDURE — 85027 COMPLETE CBC AUTOMATED: CPT

## 2023-10-06 PROCEDURE — 96372 THER/PROPH/DIAG INJ SC/IM: CPT

## 2023-10-06 PROCEDURE — 83735 ASSAY OF MAGNESIUM: CPT

## 2023-10-06 PROCEDURE — 2500000002 HC RX 250 W HCPCS SELF ADMINISTERED DRUGS (ALT 637 FOR MEDICARE OP, ALT 636 FOR OP/ED)

## 2023-10-06 PROCEDURE — 99232 SBSQ HOSP IP/OBS MODERATE 35: CPT

## 2023-10-06 PROCEDURE — 94640 AIRWAY INHALATION TREATMENT: CPT

## 2023-10-06 PROCEDURE — 94668 MNPJ CHEST WALL SBSQ: CPT

## 2023-10-06 PROCEDURE — 99255 IP/OBS CONSLTJ NEW/EST HI 80: CPT | Performed by: INTERNAL MEDICINE

## 2023-10-06 PROCEDURE — 2060000001 HC INTERMEDIATE ICU ROOM DAILY

## 2023-10-06 RX ORDER — IPRATROPIUM BROMIDE AND ALBUTEROL SULFATE 2.5; .5 MG/3ML; MG/3ML
3 SOLUTION RESPIRATORY (INHALATION)
Status: DISCONTINUED | OUTPATIENT
Start: 2023-10-06 | End: 2023-10-12 | Stop reason: HOSPADM

## 2023-10-06 RX ORDER — PREDNISONE 20 MG/1
40 TABLET ORAL DAILY
Status: DISCONTINUED | OUTPATIENT
Start: 2023-10-07 | End: 2023-10-12 | Stop reason: HOSPADM

## 2023-10-06 RX ORDER — POTASSIUM CHLORIDE 14.9 MG/ML
20 INJECTION INTRAVENOUS
Status: COMPLETED | OUTPATIENT
Start: 2023-10-06 | End: 2023-10-06

## 2023-10-06 RX ORDER — OLANZAPINE 10 MG/2ML
5 INJECTION, POWDER, FOR SOLUTION INTRAMUSCULAR ONCE
Status: COMPLETED | OUTPATIENT
Start: 2023-10-06 | End: 2023-10-06

## 2023-10-06 RX ORDER — LABETALOL HYDROCHLORIDE 5 MG/ML
10 INJECTION, SOLUTION INTRAVENOUS ONCE
Status: DISCONTINUED | OUTPATIENT
Start: 2023-10-06 | End: 2023-10-10

## 2023-10-06 RX ADMIN — ACETAMINOPHEN 650 MG: 325 TABLET ORAL at 21:30

## 2023-10-06 RX ADMIN — IPRATROPIUM BROMIDE AND ALBUTEROL SULFATE 3 ML: 2.5; .5 SOLUTION RESPIRATORY (INHALATION) at 14:52

## 2023-10-06 RX ADMIN — PANTOPRAZOLE SODIUM 40 MG: 40 TABLET, DELAYED RELEASE ORAL at 08:37

## 2023-10-06 RX ADMIN — MIRTAZAPINE 15 MG: 15 TABLET, FILM COATED ORAL at 21:31

## 2023-10-06 RX ADMIN — IPRATROPIUM BROMIDE AND ALBUTEROL SULFATE 3 ML: 2.5; .5 SOLUTION RESPIRATORY (INHALATION) at 03:20

## 2023-10-06 RX ADMIN — POTASSIUM CHLORIDE 20 MEQ: 14.9 INJECTION, SOLUTION INTRAVENOUS at 13:26

## 2023-10-06 RX ADMIN — IPRATROPIUM BROMIDE AND ALBUTEROL SULFATE 3 ML: 2.5; .5 SOLUTION RESPIRATORY (INHALATION) at 09:12

## 2023-10-06 RX ADMIN — FLUTICASONE FUROATE AND VILANTEROL TRIFENATATE 1 PUFF: 100; 25 POWDER RESPIRATORY (INHALATION) at 08:36

## 2023-10-06 RX ADMIN — ENOXAPARIN SODIUM 30 MG: 30 INJECTION SUBCUTANEOUS at 13:02

## 2023-10-06 RX ADMIN — OLANZAPINE 5 MG: 10 INJECTION, POWDER, LYOPHILIZED, FOR SOLUTION INTRAMUSCULAR at 16:27

## 2023-10-06 RX ADMIN — Medication: at 08:00

## 2023-10-06 RX ADMIN — POTASSIUM CHLORIDE 20 MEQ: 14.9 INJECTION, SOLUTION INTRAVENOUS at 10:25

## 2023-10-06 RX ADMIN — Medication 50 L/MIN: at 14:54

## 2023-10-06 RX ADMIN — PREDNISONE 40 MG: 20 TABLET ORAL at 08:37

## 2023-10-06 RX ADMIN — ATORVASTATIN CALCIUM 80 MG: 80 TABLET, FILM COATED ORAL at 21:30

## 2023-10-06 RX ADMIN — SERTRALINE HYDROCHLORIDE 25 MG: 50 TABLET ORAL at 08:37

## 2023-10-06 RX ADMIN — Medication 5 MG: at 21:31

## 2023-10-06 RX ADMIN — ONDANSETRON 4 MG: 4 TABLET, ORALLY DISINTEGRATING ORAL at 19:12

## 2023-10-06 RX ADMIN — QUETIAPINE FUMARATE 12.5 MG: 25 TABLET ORAL at 21:32

## 2023-10-06 RX ADMIN — IPRATROPIUM BROMIDE AND ALBUTEROL SULFATE 3 ML: 2.5; .5 SOLUTION RESPIRATORY (INHALATION) at 19:55

## 2023-10-06 RX ADMIN — TIOTROPIUM BROMIDE INHALATION SPRAY 2 PUFF: 3.12 SPRAY, METERED RESPIRATORY (INHALATION) at 08:36

## 2023-10-06 RX ADMIN — POTASSIUM CHLORIDE 20 MEQ: 1.5 FOR SOLUTION ORAL at 08:37

## 2023-10-06 ASSESSMENT — COGNITIVE AND FUNCTIONAL STATUS - GENERAL
CLIMB 3 TO 5 STEPS WITH RAILING: TOTAL
WALKING IN HOSPITAL ROOM: A LOT
MOVING FROM LYING ON BACK TO SITTING ON SIDE OF FLAT BED WITH BEDRAILS: A LITTLE
MOVING FROM LYING ON BACK TO SITTING ON SIDE OF FLAT BED WITH BEDRAILS: A LITTLE
TOILETING: A LITTLE
EATING MEALS: A LITTLE
TURNING FROM BACK TO SIDE WHILE IN FLAT BAD: A LITTLE
MOBILITY SCORE: 15
WALKING IN HOSPITAL ROOM: A LOT
HELP NEEDED FOR BATHING: A LITTLE
DRESSING REGULAR LOWER BODY CLOTHING: A LITTLE
MOVING TO AND FROM BED TO CHAIR: A LITTLE
STANDING UP FROM CHAIR USING ARMS: A LOT
DRESSING REGULAR LOWER BODY CLOTHING: A LITTLE
TURNING FROM BACK TO SIDE WHILE IN FLAT BAD: A LITTLE
DRESSING REGULAR UPPER BODY CLOTHING: A LITTLE
PERSONAL GROOMING: A LITTLE
DRESSING REGULAR UPPER BODY CLOTHING: A LITTLE
CLIMB 3 TO 5 STEPS WITH RAILING: A LOT
PERSONAL GROOMING: A LITTLE
DAILY ACTIVITIY SCORE: 18
STANDING UP FROM CHAIR USING ARMS: A LOT
EATING MEALS: A LITTLE
MOBILITY SCORE: 14
MOVING TO AND FROM BED TO CHAIR: A LITTLE
DAILY ACTIVITIY SCORE: 18
HELP NEEDED FOR BATHING: A LITTLE
TOILETING: A LITTLE

## 2023-10-06 ASSESSMENT — PAIN - FUNCTIONAL ASSESSMENT
PAIN_FUNCTIONAL_ASSESSMENT: 0-10
PAIN_FUNCTIONAL_ASSESSMENT: 0-10

## 2023-10-06 ASSESSMENT — PAIN SCALES - GENERAL
PAINLEVEL_OUTOF10: 7
PAINLEVEL_OUTOF10: 0 - NO PAIN

## 2023-10-06 NOTE — H&P (VIEW-ONLY)
Inpatient consult to Pulmonology  Consult performed by: Dax Pat MD  Consult ordered by: Gold Avila DO        Department of Medicine  Division of Pulmonary, Critical Care, and Sleep Medicine    Reason For Consult  I was asked to evaluate Ms. Christie Arias for respiratory failure    History Of Present Illness  Christie Arias is a 68 y.o. female with history of COPD, CVA admitted with acute hypoxic respiratory failure.  Patient reported worsening dyspnea for about a week before admission, associated with cough mainly dry, chest tightness.  Denied any chest pain, no wheezing, no fever or chills, no hemoptysis.  Has been treated for COPD exacerbation by hypoxia is worsening, needing Airvo today.         Past Medical History:   Diagnosis Date    Personal history of other diseases of the digestive system     History of diverticulitis of colon       Past Surgical History:   Procedure Laterality Date    CT ABDOMEN PELVIS ANGIOGRAM W AND/OR WO IV CONTRAST  4/12/2023    CT ABDOMEN PELVIS ANGIOGRAM W AND/OR WO IV CONTRAST [facility] CT    OTHER SURGICAL HISTORY  07/22/2022    Colectomy    OTHER SURGICAL HISTORY  07/22/2022    Hysterectomy    OTHER SURGICAL HISTORY  07/22/2022    Femorofemoral bypass        Social History     Tobacco Use    Smoking status: Never    Smokeless tobacco: Never       No family history on file.      Allergies  Codeine    Review of Systems  All other systems reviewed and negative otherwise.     Physical Exam  Constitutional:       Comments: Chronically ill looking, alert and oriented   HENT:      Head: Normocephalic and atraumatic.      Nose: Nose normal.      Mouth/Throat:      Pharynx: Oropharynx is clear.   Eyes:      Pupils: Pupils are equal, round, and reactive to light.   Cardiovascular:      Rate and Rhythm: Normal rate and regular rhythm.   Pulmonary:      Comments: Coarse breath sounds bilaterally, no wheezing.  Abdominal:      General: There is no distension.      Palpations:  "Abdomen is soft.      Tenderness: There is no abdominal tenderness.   Musculoskeletal:      Cervical back: Neck supple.      Right lower leg: No edema.      Left lower leg: No edema.   Neurological:      Mental Status: She is oriented to person, place, and time.   Psychiatric:         Mood and Affect: Mood normal.         Behavior: Behavior normal.           Vital Signs  Visit Vitals  BP (!) 131/93 (BP Location: Left arm, Patient Position: Lying)   Pulse 110   Temp 37.1 °C (98.8 °F) (Temporal)   Resp 24   Ht 1.753 m (5' 9\")   Wt 48.3 kg (106 lb 7.7 oz)   SpO2 91%   BMI 15.72 kg/m²   Smoking Status Never   BSA 1.53 m²      Lab Results   Component Value Date    WBC 9.4 10/06/2023    HGB 8.7 (L) 10/06/2023    HCT 28.9 (L) 10/06/2023    MCV 80 10/06/2023     10/06/2023      Lab Results   Component Value Date    GLUCOSE 89 10/06/2023    CALCIUM 8.3 (L) 10/06/2023     10/06/2023    K 3.3 (L) 10/06/2023    CO2 24 10/06/2023     10/06/2023    BUN 23 10/06/2023    CREATININE 1.06 (H) 10/06/2023      Lab Results   Component Value Date    ALT 18 10/02/2023    AST 27 10/02/2023    ALKPHOS 121 10/02/2023    BILITOT 0.5 10/02/2023        Oxygen Therapy  SpO2: 91 %  Oxygen Therapy: Supplemental oxygen  O2 Delivery Method: Other (Comment) (airvo)  FiO2 (%): 80 %    Medications   Scheduled medications  [Held by provider] amLODIPine, 10 mg, oral, Daily  atorvastatin, 80 mg, oral, Nightly  enoxaparin, 30 mg, subcutaneous, q24h  [Held by provider] ferrous sulfate, 65 mg of iron, oral, Daily with breakfast  fluticasone furoate-vilanteroL, 1 puff, inhalation, Daily  ipratropium-albuteroL, 3 mL, nebulization, TID  melatonin, 5 mg, oral, Nightly  mirtazapine, 15 mg, oral, q24h  [START ON 10/7/2023] oxygen, , inhalation, Continuous - 02/gases  pantoprazole, 40 mg, oral, Daily before breakfast  potassium chloride, 20 mEq, intravenous, q2h  QUEtiapine, 12.5 mg, oral, Nightly  sertraline, 25 mg, oral, Daily  tiotropium, 2 " puff, inhalation, Daily      Continuous medications     PRN medications  PRN medications: acetaminophen **OR** [DISCONTINUED] acetaminophen **OR** [DISCONTINUED] acetaminophen, docusate sodium, guaiFENesin, ipratropium-albuteroL, ondansetron ODT **OR** [DISCONTINUED] ondansetron, oxygen, polyethylene glycol     Chest Radiograph   CT angio chest for pulmonary embolism 10/02/2023    Narrative  STUDY:  CT Angiogram of the Chest; 10/2/2023 at 2:14 a.m.  INDICATION:  Pulmonary embolism evaluation.  COMPARISON:  One-view CXR 10/1/2023.  CTA CAP 8/15/2023.  ACCESSION NUMBER(S):  [id number]  ORDERING CLINICIAN:  AZUL TERESA  TECHNIQUE:  CTA of the chest was performed with intravenous contrast.  Images are reviewed and processed at a workstation according to the CT  angiogram protocol with 3-D and/or MIP post processing imaging  generated.  Omnipaque 350 100 mL was administered intravenously.  Automated mA/kV exposure control was utilized and patient examination  was performed in strict accordance with principles of ALARA.  FINDINGS:  Pulmonary arteries are adequately opacified without acute or chronic  filling defects.  Distal thoracic aorta measures 4.3 cm unchanged.  The heart is normal in size without pericardial effusion.  Thoracic  lymph nodes are not enlarged.  There is no pleural effusion, pleural thickening, or pneumothorax.  Improved left lower lobe mucous plugging with residual left basilar  atelectasis.  No evidence of central endobronchial lesion.  Severe  emphysema.  Small amount of right basilar atelectasis.  Lungs are  clear without consolidation, interstitial disease, or suspicious  nodules.  Upper abdomen demonstrates no acute pathology.  There are no acute fractures.  No suspicious bony lesions.    Impression  No acute pulmonary embolism  Distal thoracic aortic aneurysm unchanged measuring 4.3 cm  Improved left lower lobe mucous plugging and postobstructive  atelectasis since the prior.  Signed by  "Son Singh MD      XR chest 1 view 10/01/2023    Narrative  STUDY:  Chest Radiograph;  10/1/2023 11:13PM  INDICATION:  Shortness of breath.  COMPARISON:  XR chest 8/4/2023  ACCESSION NUMBER(S):  [id number]  ORDERING CLINICIAN:  AZUL TERESA  TECHNIQUE:  Frontal chest was obtained at 22:57 hours.  FINDINGS:  CARDIOMEDIASTINAL SILHOUETTE:  Cardiomediastinal silhouette is normal in size and configuration.    LUNGS:  Lungs are clear.  Diffuse chronic parenchymal lung changes are again  apparent.  No large pleural effusion.  No pneumothorax    ABDOMEN:  No remarkable upper abdominal findings.    BONES:  No acute osseous changes.    Impression  No acute pulmonary abnormality.  Signed by Joseph Mcnair MD         Pulmonary Function Tests   Pulmonary Functions Testing Results:  No results found for: \"FEV1\", \"FVC\", \"OWZ6GUM\", \"TLC\", \"DLCO\"         Assessment and Plan / Recommendations   Assessment/Plan   68-year-old female with history of hypertension, CVA, COPD admitted with acute hypoxic respiratory failure    1.  Acute hypoxic respiratory failure due to COPD exacerbation, suspected pneumonia, mucous plugging and atelectasis  2.  COPD exacerbation  3.  Suspected pneumonia/mucous plugging and atelectasis-patient with left lower lobe consolidation and atelectasis persistent/worsening since at least April of this year    -Restart prednisone 40 mg daily  -Continue nebs, Breo  -Incentive spirometry, Acapella, vest, wean O2 as tolerated.  If no improvement will consider bronchoscopy early next week to rule out endobronchial lesion as cause of chronic partial left lower lobe collapse  -If no improvement with bronchial hygiene recommend to start Zosyn for suspected pneumonia            Dax Pat MD    "

## 2023-10-06 NOTE — NURSING NOTE
Called to patient bedside for low sat, 85% on 8L NC. Switched to 12L HF sat continues 85-87%. Placed back on Bipap 15/5 70% Sats 92%

## 2023-10-06 NOTE — PROGRESS NOTES
Christie Arias is a 68 y.o. female on day 4 of admission presenting with Chronic obstructive pulmonary disease (CMS/HCC).    Subjective   Interval History: no fever, intermittent hypoxia        Review of Systems    Objective   Range of Vitals (last 24 hours)  Heart Rate:  []   Temp:  [35.6 °C (96.1 °F)-37.1 °C (98.8 °F)]   Resp:  [20-26]   BP: (102-131)/(72-93)   SpO2:  [91 %-99 %]   Daily Weight  10/02/23 : 48.3 kg (106 lb 7.7 oz)    Body mass index is 15.72 kg/m².    Physical Exam  Constitutional:       Appearance: Normal appearance.   HENT:      Head: Normocephalic and atraumatic.      Mouth/Throat:      Mouth: Mucous membranes are moist.      Pharynx: Oropharynx is clear.   Eyes:      Pupils: Pupils are equal, round, and reactive to light.   Cardiovascular:      Rate and Rhythm: Normal rate and regular rhythm.      Heart sounds: Normal heart sounds.   Pulmonary:      Effort: Pulmonary effort is normal.      Breath sounds: Normal breath sounds.   Abdominal:      General: Abdomen is flat. Bowel sounds are normal.      Palpations: Abdomen is soft.   Musculoskeletal:      Cervical back: Normal range of motion.   Neurological:      Mental Status: She is alert.         Antibiotics  sodium chloride 0.9 % bolus 500 mL  methylPREDNISolone sod succinate (PF) (SOLU-Medrol) injection 125 mg  piperacillin-tazobactam-dextrose (Zosyn) IV 4.5 g  vancomycin in dextrose 5 % (Vancocin) IVPB 1 g  oxygen (O2) therapy  sodium chloride 0.9 % bolus 1,572 mL  iohexol (OMNIPaque) 350 mg iodine/mL injection 100 mL  oxygen (O2) therapy  ondansetron ODT (Zofran-ODT) disintegrating tablet 4 mg  ondansetron (Zofran) injection 4 mg  acetaminophen (Tylenol) tablet 650 mg  acetaminophen (Tylenol) oral liquid 650 mg  acetaminophen (Tylenol) suppository 650 mg  pantoprazole (ProtoNix) injection 80 mg  pantoprazole (ProtoNix) injection 40 mg  ipratropium-albuteroL (Duo-Neb) 0.5-2.5 mg/3 mL nebulizer solution 3  mL  piperacillin-tazobactam-dextrose (Zosyn) IV 3.375 g  predniSONE (Deltasone) tablet 40 mg  vancomycin in dextrose 5 % (Vancocin) IVPB 500 mg  ipratropium-albuteroL (Duo-Neb) 0.5-2.5 mg/3 mL nebulizer solution 3 mL  ipratropium-albuteroL (Duo-Neb) 0.5-2.5 mg/3 mL nebulizer solution 3 mL  ipratropium-albuteroL (Duo-Neb) 0.5-2.5 mg/3 mL nebulizer solution 3 mL  tiotropium (Spiriva Respimat) 2.5 mcg/actuation inhaler 2 puff  fluticasone furoate-vilanteroL (Breo Ellipta) 100-25 mcg/dose inhaler 1 puff  amLODIPine (Norvasc) tablet  aspirin EC tablet      traMADol (Ultram) tablet  pantoprazole (ProtoNix) EC tablet  mirtazapine (Remeron) tablet      nitroglycerin (Nitrostat) SL tablet  potassium chloride SA (Klor-Con M20) ER tablet  sertraline (Zoloft) tablet  acetaminophen (Tylenol) tablet  ondansetron (Zofran) tablet  acetaminophen (Tylenol) tablet 325 mg  amLODIPine (Norvasc) tablet 10 mg  atorvastatin (Lipitor) tablet 80 mg  guaiFENesin (Robitussin) 100 mg/5 mL syrup 100 mg  mirtazapine (Remeron) tablet 15 mg  sertraline (Zoloft) tablet 25 mg  potassium chloride IV 20 mEq  oxygen (O2) therapy  ipratropium-albuteroL (Duo-Neb) 0.5-2.5 mg/3 mL nebulizer solution 3 mL  ferrous sulfate 325 (65 Fe) MG tablet 65 mg of iron  oxygen (O2) therapy  iron sucrose (Venofer) 200 mg in sodium chloride 0.9% 100 mL IVPB  melatonin tablet 5 mg  ipratropium-albuteroL (Duo-Neb) 0.5-2.5 mg/3 mL nebulizer solution 3 mL  enoxaparin (Lovenox) syringe 50 mg  piperacillin-tazobactam-dextrose (Zosyn) IV 4.5 g  enoxaparin (Lovenox) syringe 30 mg  piperacillin-tazobactam-dextrose (Zosyn) IV 3.375 g  vancomycin in dextrose 5 % (Vancocin) IVPB 1,000 mg  enoxaparin (Lovenox) syringe 30 mg  ipratropium-albuteroL (Duo-Neb) 0.5-2.5 mg/3 mL nebulizer solution 3 mL  OLANZapine (ZyPREXA) injection 5 mg  potassium chloride IV 20 mEq  potassium chloride (Klor-Con) packet 20 mEq  metoprolol tartrate (Lopressor) 5 mg/5 mL injection  - Omnicell Override  Pull  metoprolol tartrate (Lopressor) injection 5 mg  oxygen (O2) therapy  polyethylene glycol (Glycolax, Miralax) packet 17 g  docusate sodium (Colace) capsule 100 mg  potassium chloride IV 20 mEq  magnesium oxide (Mag-Ox) tablet 400 mg  OLANZapine (ZyPREXA) injection 5 mg  QUEtiapine (SEROquel) tablet 12.5 mg  pantoprazole (ProtoNix) EC tablet 40 mg  oxygen (O2) therapy  ipratropium-albuteroL (Duo-Neb) 0.5-2.5 mg/3 mL nebulizer solution 3 mL  oxygen (O2) therapy  oxygen (O2) therapy  potassium chloride IV 20 mEq  predniSONE (Deltasone) tablet 40 mg      Relevant Results  Labs  Results from last 72 hours   Lab Units 10/06/23  0614 10/05/23  0603 10/04/23  0558   WBC AUTO x10*3/uL 9.4 10.6 13.6*   HEMOGLOBIN g/dL 8.7* 8.3* 8.5*   HEMATOCRIT % 28.9* 27.3* 27.5*   PLATELETS AUTO x10*3/uL 350 380 411     Results from last 72 hours   Lab Units 10/06/23  0614 10/05/23  0602 10/05/23  0247   SODIUM mmol/L 139 139 136   POTASSIUM mmol/L 3.3* 3.5 3.1*   CHLORIDE mmol/L 104 105 102   CO2 mmol/L 24 25 23   BUN mg/dL 23 17 17   CREATININE mg/dL 1.06* 0.85 0.82   GLUCOSE mg/dL 89 83 103*   CALCIUM mg/dL 8.3* 8.1* 8.4*   ANION GAP mmol/L 14 13 14   EGFR mL/min/1.73m*2 57* 74 77   PHOSPHORUS mg/dL 3.4 2.8 3.2     Results from last 72 hours   Lab Units 10/06/23  0614 10/05/23  0602 10/05/23  0247   ALBUMIN g/dL 3.3* 3.3* 3.6     Estimated Creatinine Clearance: 37.7 mL/min (A) (by C-G formula based on SCr of 1.06 mg/dL (H)).  C-Reactive Protein   Date Value Ref Range Status   10/03/2023 0.84 <1.00 mg/dL Final     Microbiology  Susceptibility data from last 14 days.  Collected Specimen Info Organism   10/02/23 Blood culture from Peripheral Venipuncture Staphylococcus epidermidis   Imaging  Reviewed    Assessment/Plan     Bacteremia, CNS, the repeat BC is negative  Respiratory failure / atelectasis, intermittent hypoxia is likely recurrent atelectasis  Failure to thrive     Recommendations :  Supportive care  Chest PT     I spent   minutes in the professional and overall care of this patient.      Leslye Cortez MD

## 2023-10-06 NOTE — CONSULTS
Inpatient consult to Pulmonology  Consult performed by: Dax Pat MD  Consult ordered by: Gold Avila DO        Department of Medicine  Division of Pulmonary, Critical Care, and Sleep Medicine    Reason For Consult  I was asked to evaluate Ms. Christie Arias for respiratory failure    History Of Present Illness  Christie Arias is a 68 y.o. female with history of COPD, CVA admitted with acute hypoxic respiratory failure.  Patient reported worsening dyspnea for about a week before admission, associated with cough mainly dry, chest tightness.  Denied any chest pain, no wheezing, no fever or chills, no hemoptysis.  Has been treated for COPD exacerbation by hypoxia is worsening, needing Airvo today.         Past Medical History:   Diagnosis Date    Personal history of other diseases of the digestive system     History of diverticulitis of colon       Past Surgical History:   Procedure Laterality Date    CT ABDOMEN PELVIS ANGIOGRAM W AND/OR WO IV CONTRAST  4/12/2023    CT ABDOMEN PELVIS ANGIOGRAM W AND/OR WO IV CONTRAST [facility] CT    OTHER SURGICAL HISTORY  07/22/2022    Colectomy    OTHER SURGICAL HISTORY  07/22/2022    Hysterectomy    OTHER SURGICAL HISTORY  07/22/2022    Femorofemoral bypass        Social History     Tobacco Use    Smoking status: Never    Smokeless tobacco: Never       No family history on file.      Allergies  Codeine    Review of Systems  All other systems reviewed and negative otherwise.     Physical Exam  Constitutional:       Comments: Chronically ill looking, alert and oriented   HENT:      Head: Normocephalic and atraumatic.      Nose: Nose normal.      Mouth/Throat:      Pharynx: Oropharynx is clear.   Eyes:      Pupils: Pupils are equal, round, and reactive to light.   Cardiovascular:      Rate and Rhythm: Normal rate and regular rhythm.   Pulmonary:      Comments: Coarse breath sounds bilaterally, no wheezing.  Abdominal:      General: There is no distension.      Palpations:  "Abdomen is soft.      Tenderness: There is no abdominal tenderness.   Musculoskeletal:      Cervical back: Neck supple.      Right lower leg: No edema.      Left lower leg: No edema.   Neurological:      Mental Status: She is oriented to person, place, and time.   Psychiatric:         Mood and Affect: Mood normal.         Behavior: Behavior normal.           Vital Signs  Visit Vitals  BP (!) 131/93 (BP Location: Left arm, Patient Position: Lying)   Pulse 110   Temp 37.1 °C (98.8 °F) (Temporal)   Resp 24   Ht 1.753 m (5' 9\")   Wt 48.3 kg (106 lb 7.7 oz)   SpO2 91%   BMI 15.72 kg/m²   Smoking Status Never   BSA 1.53 m²      Lab Results   Component Value Date    WBC 9.4 10/06/2023    HGB 8.7 (L) 10/06/2023    HCT 28.9 (L) 10/06/2023    MCV 80 10/06/2023     10/06/2023      Lab Results   Component Value Date    GLUCOSE 89 10/06/2023    CALCIUM 8.3 (L) 10/06/2023     10/06/2023    K 3.3 (L) 10/06/2023    CO2 24 10/06/2023     10/06/2023    BUN 23 10/06/2023    CREATININE 1.06 (H) 10/06/2023      Lab Results   Component Value Date    ALT 18 10/02/2023    AST 27 10/02/2023    ALKPHOS 121 10/02/2023    BILITOT 0.5 10/02/2023        Oxygen Therapy  SpO2: 91 %  Oxygen Therapy: Supplemental oxygen  O2 Delivery Method: Other (Comment) (airvo)  FiO2 (%): 80 %    Medications   Scheduled medications  [Held by provider] amLODIPine, 10 mg, oral, Daily  atorvastatin, 80 mg, oral, Nightly  enoxaparin, 30 mg, subcutaneous, q24h  [Held by provider] ferrous sulfate, 65 mg of iron, oral, Daily with breakfast  fluticasone furoate-vilanteroL, 1 puff, inhalation, Daily  ipratropium-albuteroL, 3 mL, nebulization, TID  melatonin, 5 mg, oral, Nightly  mirtazapine, 15 mg, oral, q24h  [START ON 10/7/2023] oxygen, , inhalation, Continuous - 02/gases  pantoprazole, 40 mg, oral, Daily before breakfast  potassium chloride, 20 mEq, intravenous, q2h  QUEtiapine, 12.5 mg, oral, Nightly  sertraline, 25 mg, oral, Daily  tiotropium, 2 " puff, inhalation, Daily      Continuous medications     PRN medications  PRN medications: acetaminophen **OR** [DISCONTINUED] acetaminophen **OR** [DISCONTINUED] acetaminophen, docusate sodium, guaiFENesin, ipratropium-albuteroL, ondansetron ODT **OR** [DISCONTINUED] ondansetron, oxygen, polyethylene glycol     Chest Radiograph   CT angio chest for pulmonary embolism 10/02/2023    Narrative  STUDY:  CT Angiogram of the Chest; 10/2/2023 at 2:14 a.m.  INDICATION:  Pulmonary embolism evaluation.  COMPARISON:  One-view CXR 10/1/2023.  CTA CAP 8/15/2023.  ACCESSION NUMBER(S):  [id number]  ORDERING CLINICIAN:  AZUL TERESA  TECHNIQUE:  CTA of the chest was performed with intravenous contrast.  Images are reviewed and processed at a workstation according to the CT  angiogram protocol with 3-D and/or MIP post processing imaging  generated.  Omnipaque 350 100 mL was administered intravenously.  Automated mA/kV exposure control was utilized and patient examination  was performed in strict accordance with principles of ALARA.  FINDINGS:  Pulmonary arteries are adequately opacified without acute or chronic  filling defects.  Distal thoracic aorta measures 4.3 cm unchanged.  The heart is normal in size without pericardial effusion.  Thoracic  lymph nodes are not enlarged.  There is no pleural effusion, pleural thickening, or pneumothorax.  Improved left lower lobe mucous plugging with residual left basilar  atelectasis.  No evidence of central endobronchial lesion.  Severe  emphysema.  Small amount of right basilar atelectasis.  Lungs are  clear without consolidation, interstitial disease, or suspicious  nodules.  Upper abdomen demonstrates no acute pathology.  There are no acute fractures.  No suspicious bony lesions.    Impression  No acute pulmonary embolism  Distal thoracic aortic aneurysm unchanged measuring 4.3 cm  Improved left lower lobe mucous plugging and postobstructive  atelectasis since the prior.  Signed by  "Son Singh MD      XR chest 1 view 10/01/2023    Narrative  STUDY:  Chest Radiograph;  10/1/2023 11:13PM  INDICATION:  Shortness of breath.  COMPARISON:  XR chest 8/4/2023  ACCESSION NUMBER(S):  [id number]  ORDERING CLINICIAN:  AZUL TERESA  TECHNIQUE:  Frontal chest was obtained at 22:57 hours.  FINDINGS:  CARDIOMEDIASTINAL SILHOUETTE:  Cardiomediastinal silhouette is normal in size and configuration.    LUNGS:  Lungs are clear.  Diffuse chronic parenchymal lung changes are again  apparent.  No large pleural effusion.  No pneumothorax    ABDOMEN:  No remarkable upper abdominal findings.    BONES:  No acute osseous changes.    Impression  No acute pulmonary abnormality.  Signed by Joseph Mcnair MD         Pulmonary Function Tests   Pulmonary Functions Testing Results:  No results found for: \"FEV1\", \"FVC\", \"TZJ1UPU\", \"TLC\", \"DLCO\"         Assessment and Plan / Recommendations   Assessment/Plan   68-year-old female with history of hypertension, CVA, COPD admitted with acute hypoxic respiratory failure    1.  Acute hypoxic respiratory failure due to COPD exacerbation, suspected pneumonia, mucous plugging and atelectasis  2.  COPD exacerbation  3.  Suspected pneumonia/mucous plugging and atelectasis-patient with left lower lobe consolidation and atelectasis persistent/worsening since at least April of this year    -Restart prednisone 40 mg daily  -Continue nebs, Breo  -Incentive spirometry, Acapella, vest, wean O2 as tolerated.  If no improvement will consider bronchoscopy early next week to rule out endobronchial lesion as cause of chronic partial left lower lobe collapse  -If no improvement with bronchial hygiene recommend to start Zosyn for suspected pneumonia            Dax Pat MD    "

## 2023-10-06 NOTE — PROGRESS NOTES
Subjective   Interval History: OVN, respiratory therapy called to bedsite for low sat, 85% on 8L NC. Switched to 12L HF with sats 85-87%. Placed back on Bipap 15/5 70% with sats 92%. On encounter this AM, patient denies having any anxiety at the time, however does not remember if she was sleeping when she was noted to be desatting. Says that she was SOB until they placed the BiPAP. Weaned to Airvo. Denies fevers, chills, CP, N/V. Endorses constipation (although one stool recorded yesterday).    Objective   Vital signs in last 24 hours:  Temp:  [35.6 °C (96.1 °F)-36.9 °C (98.4 °F)] 36.5 °C (97.7 °F)  Heart Rate:  [] 89  Resp:  [20-26] 25  BP: (102-126)/(72-96) 106/85    Intake/Output last 3 shifts:  I/O last 3 completed shifts:  In: 1160 (24 mL/kg) [P.O.:960; IV Piggyback:200]  Out: 1550 (32.1 mL/kg) [Urine:1150 (0.7 mL/kg/hr); Other:400]  Weight: 48.3 kg   Intake/Output this shift:  No intake/output data recorded.    Physical Exam  Constitutional:       Comments: cachectic   HENT:      Head: Normocephalic and atraumatic.      Mouth/Throat:      Mouth: Mucous membranes are moist.      Pharynx: Oropharynx is clear.   Eyes:      Extraocular Movements: Extraocular movements intact.      Conjunctiva/sclera: Conjunctivae normal.   Cardiovascular:      Rate and Rhythm: Normal rate and regular rhythm.      Heart sounds: Normal heart sounds.   Pulmonary:      Effort: Pulmonary effort is normal.      Comments: Faint wheezing bilaterally  Abdominal:      General: Abdomen is flat. Bowel sounds are normal.      Palpations: Abdomen is soft.   Musculoskeletal:      Cervical back: Normal range of motion.   Neurological:      Mental Status: She is alert and oriented to person, place, and time.   Psychiatric:         Mood and Affect: Mood normal.         Scheduled medications  [Held by provider] amLODIPine, 10 mg, oral, Daily  atorvastatin, 80 mg, oral, Nightly  enoxaparin, 30 mg, subcutaneous, q24h  [Held by provider] ferrous  sulfate, 65 mg of iron, oral, Daily with breakfast  fluticasone furoate-vilanteroL, 1 puff, inhalation, Daily  ipratropium-albuteroL, 3 mL, nebulization, TID  melatonin, 5 mg, oral, Nightly  mirtazapine, 15 mg, oral, q24h  oxygen, , inhalation, Continuous - 02/gases  pantoprazole, 40 mg, oral, Daily before breakfast  potassium chloride, 20 mEq, oral, BID  predniSONE, 40 mg, oral, Daily  QUEtiapine, 12.5 mg, oral, Nightly  sertraline, 25 mg, oral, Daily  tiotropium, 2 puff, inhalation, Daily      Continuous medications     PRN medications  PRN medications: acetaminophen **OR** [DISCONTINUED] acetaminophen **OR** [DISCONTINUED] acetaminophen, docusate sodium, guaiFENesin, ipratropium-albuteroL, ondansetron ODT **OR** [DISCONTINUED] ondansetron, oxygen, polyethylene glycol     Results  Lab Results   Component Value Date    WBC 9.4 10/06/2023    HGB 8.7 (L) 10/06/2023    HCT 28.9 (L) 10/06/2023    MCV 80 10/06/2023     10/06/2023      Lab Results   Component Value Date    GLUCOSE 89 10/06/2023    CALCIUM 8.3 (L) 10/06/2023     10/06/2023    K 3.3 (L) 10/06/2023    CO2 24 10/06/2023     10/06/2023    BUN 23 10/06/2023    CREATININE 1.06 (H) 10/06/2023    MG 1.86 10/06/2023      Assessment/Plan     Christie Arias is a 68 y.o. 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      Acute medical conditions:    #Acute hypoxic and hypercapneic respiratory failure, likely 2/2 COPD exacerbation  #LLL mucous plugging and atelectasis  #R/o PNA  Less likely infection given no fever and interval improvement of LLL opacification/mucous plugging on CT PE. Patient with recurrent episodes during hospitalization of hypoxia, requiring placement on BiPAP with quick weans to NC. Unclear etiology as COPD exacerbation is being addressed. Could consider  CHRISTELLE as these events happen overnight.  - S/p Vanc and Zosyn (10/1 - 10/2) and vanc (10/3 - 10/5), s/p solumedrol in ED  - Procal 0.16, CRP and ESR WNL  Plan  - Continue Prednisone 40mg (10/2 - current)  - Scheduled Duonebs TID and PRN, Spiriva, Breo  - Bronchial hygiene (acapella) and incentive spirometry; chest physiotherapy  - Keep SPO2 above 88%, wean O2 as tolerated  - Follow up repeat CXR 10/6  - Dr. Pat consulted, recs appreciated  - If no improvement in resp status, consider bronchoscopy early next week to rule out endobronchial lesion as cause of chronic partial LLL collapse + consider starting Zosyn for suspected pneumonia    #Acute on chronic anemia likely 2/2 JIE, stable  Less likely UGIB given no clinical signs or MDS given only RBCs affected  - Hgb on admission: 6.0 (baseline 9.0 to 7.8), MCV 74 with FOBT positive   - Iron studies (10/2/2022): iron 18, % sat 4, TIBC 418, ferritin 19  - EGD (11/2022) unremarkable; Colonoscopy (11/2022): diverticulosis in sigmoid colon   - Normal BUN:Cr ratio  - s/p 2 units of blood transfusion, venofer 10/2  - Cont IV protonix 40mg daily  - Hold PO iron supplementation per GI recs as it may produce black stools  - Plan for OP heme onc follow-up     #Failure to thrive  - consult nutrition     Resolved issues    #Positive blood cx, likely contaminant  - Blood cx 1/2 sets (10/2) positive for Staph epidermidis  - CRP, ESR WNL  - Repeat blood cx (10/3) no growth to date  - Consult ID, recs appreciated  - Cont vanc pending repeat cultures (10/3 - current)    #Non-MI Troponin elevation likely 2/2 demand ischemia  - EKG on admission: Sinus tachycardia with no ST or T wave morphologies  - Echo (07/2023): Diastolic dysfunction with LVEF 60%  - Trop: 2074->1201  - Pt on telemetry  - Monitor Hgb and transfuse if < 7.0    Chronic medical conditions  #HTN: hold home amlodipine 10mg given soft BPs  #MDD: cont zoloft and remeron  #Anxiety: start seroquel 12.5mg at bedtime     F: PO  E:  replete as needed  N: regular diet  GI ppx: Protonix  DVT ppx: lovenox  Abx: none     Dispo: Admitted for COPD exacerbation and anemia s/p transfusion. Pulmonology following for persistent hypoxia. Est LOS > 48h.    Principal Problem:    Chronic obstructive pulmonary disease (CMS/HCC)  Active Problems:    Respiratory failure (CMS/HCC)    Severe protein-calorie malnutrition (CMS/HCC)    Iron deficiency anemia    Anxiety    Failure to thrive in adult      Delilah Sandoval MD

## 2023-10-06 NOTE — PROGRESS NOTES
"Speech-Language Pathology    Inpatient Speech-Language Pathology Clinical Swallow Evaluation    Patient Name: Christie Arias  MRN: [id number]  Today's Date: 10/6/2023   Time Calculation  Start Time: 0902  Stop Time: 0927  Time Calculation (min): 25 min         Current Problem:   Patient Active Problem List   Diagnosis    Respiratory failure (CMS/HCC)    Severe protein-calorie malnutrition (CMS/HCC)    Dementia with psychosis (CMS/HCC)    Iron deficiency anemia    Anxiety    Chronic obstructive pulmonary disease (CMS/HCC)    Failure to thrive in adult    HTN (hypertension)    Hyperlipidemia     Copied from medical note: \"Christie Arias is a 68 y.o. 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, stable thoracic aorta aneurysm presented for worsening shortness of breath.\"     Acute medical conditions:     #Acute hypoxic and hypercapneic respiratory failure likely 2/2 COPD exacerbation  #R/o pneumonia    Recommendations:  Risk for Aspiration: Yes  Additional Recommendations: Dysphagia treatment  Solid Diet Recommendations : Regular (IDDSI Level 7) (easy tochew items)  Liquid Diet Recommendations: Thin (IDDSI Level 0)  Medication Administration Recommendations: Whole, With Pureed, With Liquid  Follow up treatments: Diet tolerance monitoring      Assessment:  Assessment Results: Mild oropharyngeal dysphagia characterized by slowed oral management of solids due to edentulous status. Risk for aspiration due to > 30 L/O2 via AIRVO.   Prognosis: Good  Strengths: Cognition      Plan:  Treatment/Interventions: Diet recommendations, Patient/family education  SLP Plan: Skilled SLP  SLP Frequency: 1x per week  Duration: 2 weeks  Solid Consistency: Regular (IDDSI Level 7)  Liquid Consistency: Thin (IDDSI Level 0)    Goals:   ST GOALS:  Patient/Family will verbalize/demonstrate comprehension of dysphagia education, strategies, " "recommendations and POC.  Patient will implement safe swallowing strategies to reduce risk of aspiration given caregiver assistance/cueing as needed.    Pt will participate in a repeat bedside swallow exam during this admission.                      LONG TERM GOALS  Patient will tolerate the least restrictive diet without overt difficulty by time of discharge.    Subjective   Current Problem:  Desaturation this morning and over night resulting in starting AIRVO.       General Visit Information:  Patient Class: Inpatient  Living Environment: Nursing home (skilled/long-term)  Reason for Referral: Concern for dysphagia.  Prior Level of Function: WFL  [employer] Diet: regular consistency with thin liquids  Current Diet : regulr diet with thin liquids  Dysphagia Diagnosis: Mild oral stage dysphagia      Objective       Baseline Assessment:  Respiratory Status: Other (Comment) (50 L at 85% via AIRVO since this morning.)  Behavior/Cognition: Alert, Cooperative  Vision: Functional for self-feeding  Volitional Cough: Congested  Volitional Swallow: Within Functional Limits      Pain:  Pain Assessment: 0-10  Pain Score: 0 - No pain       Oral/Motor Assessment:  Dentition: Edentulous  Oral Motor: Within Functional Limits      Consistencies Trialed:  Regular, pureed and thin liquids.       Clinical Observations:  Patient Positioning: Partially/Semi Reclined  Management of Oral Secretions: Adequate    Outpatient Education:    Education:  Learner patient;    Barriers to Learning none   Method demonstration; verbal; written- (Written \"Swallowing Guidelines\" posted at patient's bedside)   Education - Topic ST provided patient education regarding role of ST, purpose of assessment, clinical impressions, goals of treatment, and plan of care. Patient verbalized full comprehension, consistent with cognitive status. Education will be reinforced. ST further coordinated with RN regarding recommendations and precautions per this assessment, " with RN verbalizing understanding.     Outcome    Verbalized understanding and agreement          Inpatient:  Education Documentation  No documentation found.  Education Comments  No comments found.

## 2023-10-07 LAB
ALBUMIN SERPL BCP-MCNC: 3.4 G/DL (ref 3.4–5)
ANION GAP SERPL CALC-SCNC: 13 MMOL/L (ref 10–20)
BACTERIA BLD AEROBE CULT: ABNORMAL
BACTERIA BLD CULT: ABNORMAL
BUN SERPL-MCNC: 18 MG/DL (ref 6–23)
CALCIUM SERPL-MCNC: 8.1 MG/DL (ref 8.6–10.3)
CHLORIDE SERPL-SCNC: 103 MMOL/L (ref 98–107)
CO2 SERPL-SCNC: 25 MMOL/L (ref 21–32)
CREAT SERPL-MCNC: 0.88 MG/DL (ref 0.5–1.05)
ERYTHROCYTE [DISTWIDTH] IN BLOOD BY AUTOMATED COUNT: 22.7 % (ref 11.5–14.5)
GFR SERPL CREATININE-BSD FRML MDRD: 71 ML/MIN/1.73M*2
GLUCOSE SERPL-MCNC: 84 MG/DL (ref 74–99)
GRAM STAIN: ABNORMAL
GRAM STN SPEC: ABNORMAL
HCT VFR BLD AUTO: 31.1 % (ref 36–46)
HGB BLD-MCNC: 9.3 G/DL (ref 12–16)
MAGNESIUM SERPL-MCNC: 1.65 MG/DL (ref 1.6–2.4)
MCH RBC QN AUTO: 24.1 PG (ref 26–34)
MCHC RBC AUTO-ENTMCNC: 29.9 G/DL (ref 32–36)
MCV RBC AUTO: 81 FL (ref 80–100)
NRBC BLD-RTO: 0 /100 WBCS (ref 0–0)
PHOSPHATE SERPL-MCNC: 3.3 MG/DL (ref 2.5–4.9)
PLATELET # BLD AUTO: 400 X10*3/UL (ref 150–450)
PMV BLD AUTO: 11.1 FL (ref 7.5–11.5)
POTASSIUM SERPL-SCNC: 3.3 MMOL/L (ref 3.5–5.3)
RBC # BLD AUTO: 3.86 X10*6/UL (ref 4–5.2)
SODIUM SERPL-SCNC: 138 MMOL/L (ref 136–145)
WBC # BLD AUTO: 11.3 X10*3/UL (ref 4.4–11.3)

## 2023-10-07 PROCEDURE — 85027 COMPLETE CBC AUTOMATED: CPT | Performed by: INTERNAL MEDICINE

## 2023-10-07 PROCEDURE — 94640 AIRWAY INHALATION TREATMENT: CPT

## 2023-10-07 PROCEDURE — 2500000004 HC RX 250 GENERAL PHARMACY W/ HCPCS (ALT 636 FOR OP/ED)

## 2023-10-07 PROCEDURE — 36415 COLL VENOUS BLD VENIPUNCTURE: CPT | Performed by: INTERNAL MEDICINE

## 2023-10-07 PROCEDURE — 2500000001 HC RX 250 WO HCPCS SELF ADMINISTERED DRUGS (ALT 637 FOR MEDICARE OP)

## 2023-10-07 PROCEDURE — 83735 ASSAY OF MAGNESIUM: CPT | Performed by: INTERNAL MEDICINE

## 2023-10-07 PROCEDURE — 2060000001 HC INTERMEDIATE ICU ROOM DAILY

## 2023-10-07 PROCEDURE — 2500000002 HC RX 250 W HCPCS SELF ADMINISTERED DRUGS (ALT 637 FOR MEDICARE OP, ALT 636 FOR OP/ED): Performed by: INTERNAL MEDICINE

## 2023-10-07 PROCEDURE — 2500000004 HC RX 250 GENERAL PHARMACY W/ HCPCS (ALT 636 FOR OP/ED): Performed by: INTERNAL MEDICINE

## 2023-10-07 PROCEDURE — 99233 SBSQ HOSP IP/OBS HIGH 50: CPT | Performed by: INTERNAL MEDICINE

## 2023-10-07 PROCEDURE — 94660 CPAP INITIATION&MGMT: CPT

## 2023-10-07 PROCEDURE — 99232 SBSQ HOSP IP/OBS MODERATE 35: CPT

## 2023-10-07 PROCEDURE — 94668 MNPJ CHEST WALL SBSQ: CPT

## 2023-10-07 PROCEDURE — 96372 THER/PROPH/DIAG INJ SC/IM: CPT

## 2023-10-07 PROCEDURE — 80069 RENAL FUNCTION PANEL: CPT | Performed by: INTERNAL MEDICINE

## 2023-10-07 RX ORDER — MAGNESIUM SULFATE HEPTAHYDRATE 40 MG/ML
2 INJECTION, SOLUTION INTRAVENOUS ONCE
Status: COMPLETED | OUTPATIENT
Start: 2023-10-07 | End: 2023-10-07

## 2023-10-07 RX ORDER — POTASSIUM CHLORIDE 1.5 G/1.58G
20 POWDER, FOR SOLUTION ORAL 2 TIMES DAILY
Status: DISCONTINUED | OUTPATIENT
Start: 2023-10-07 | End: 2023-10-12 | Stop reason: HOSPADM

## 2023-10-07 RX ADMIN — IPRATROPIUM BROMIDE AND ALBUTEROL SULFATE 3 ML: 2.5; .5 SOLUTION RESPIRATORY (INHALATION) at 15:00

## 2023-10-07 RX ADMIN — TIOTROPIUM BROMIDE INHALATION SPRAY 2 PUFF: 3.12 SPRAY, METERED RESPIRATORY (INHALATION) at 08:15

## 2023-10-07 RX ADMIN — SERTRALINE HYDROCHLORIDE 25 MG: 50 TABLET ORAL at 09:43

## 2023-10-07 RX ADMIN — IPRATROPIUM BROMIDE AND ALBUTEROL SULFATE 3 ML: 2.5; .5 SOLUTION RESPIRATORY (INHALATION) at 08:52

## 2023-10-07 RX ADMIN — Medication 40 L/MIN: at 08:00

## 2023-10-07 RX ADMIN — ENOXAPARIN SODIUM 30 MG: 30 INJECTION SUBCUTANEOUS at 13:32

## 2023-10-07 RX ADMIN — MIRTAZAPINE 15 MG: 15 TABLET, FILM COATED ORAL at 20:29

## 2023-10-07 RX ADMIN — POTASSIUM CHLORIDE 20 MEQ: 1.5 POWDER, FOR SOLUTION ORAL at 20:29

## 2023-10-07 RX ADMIN — PREDNISONE 40 MG: 20 TABLET ORAL at 09:43

## 2023-10-07 RX ADMIN — IPRATROPIUM BROMIDE AND ALBUTEROL SULFATE 3 ML: 2.5; .5 SOLUTION RESPIRATORY (INHALATION) at 19:40

## 2023-10-07 RX ADMIN — ATORVASTATIN CALCIUM 80 MG: 80 TABLET, FILM COATED ORAL at 20:29

## 2023-10-07 RX ADMIN — PANTOPRAZOLE SODIUM 40 MG: 40 TABLET, DELAYED RELEASE ORAL at 08:13

## 2023-10-07 RX ADMIN — FLUTICASONE FUROATE AND VILANTEROL TRIFENATATE 1 PUFF: 100; 25 POWDER RESPIRATORY (INHALATION) at 09:50

## 2023-10-07 RX ADMIN — PIPERACILLIN SODIUM AND TAZOBACTAM SODIUM 4.5 G: 4; .5 INJECTION, SOLUTION INTRAVENOUS at 17:43

## 2023-10-07 RX ADMIN — PIPERACILLIN SODIUM AND TAZOBACTAM SODIUM 4.5 G: 4; .5 INJECTION, SOLUTION INTRAVENOUS at 23:03

## 2023-10-07 RX ADMIN — MAGNESIUM SULFATE HEPTAHYDRATE 2 G: 40 INJECTION, SOLUTION INTRAVENOUS at 09:44

## 2023-10-07 RX ADMIN — POTASSIUM CHLORIDE 20 MEQ: 1.5 POWDER, FOR SOLUTION ORAL at 09:43

## 2023-10-07 RX ADMIN — Medication 5 MG: at 20:29

## 2023-10-07 RX ADMIN — QUETIAPINE FUMARATE 12.5 MG: 25 TABLET ORAL at 20:29

## 2023-10-07 ASSESSMENT — COGNITIVE AND FUNCTIONAL STATUS - GENERAL
MOVING FROM LYING ON BACK TO SITTING ON SIDE OF FLAT BED WITH BEDRAILS: A LITTLE
MOBILITY SCORE: 14
HELP NEEDED FOR BATHING: A LITTLE
PERSONAL GROOMING: A LITTLE
TOILETING: A LITTLE
DAILY ACTIVITIY SCORE: 18
EATING MEALS: A LITTLE
EATING MEALS: A LITTLE
CLIMB 3 TO 5 STEPS WITH RAILING: TOTAL
DRESSING REGULAR UPPER BODY CLOTHING: A LITTLE
CLIMB 3 TO 5 STEPS WITH RAILING: A LOT
DAILY ACTIVITIY SCORE: 18
STANDING UP FROM CHAIR USING ARMS: A LOT
TURNING FROM BACK TO SIDE WHILE IN FLAT BAD: A LITTLE
STANDING UP FROM CHAIR USING ARMS: A LOT
MOBILITY SCORE: 15
WALKING IN HOSPITAL ROOM: A LOT
WALKING IN HOSPITAL ROOM: A LOT
DRESSING REGULAR LOWER BODY CLOTHING: A LITTLE
MOVING TO AND FROM BED TO CHAIR: A LITTLE
DRESSING REGULAR UPPER BODY CLOTHING: A LITTLE
TOILETING: A LITTLE
MOVING TO AND FROM BED TO CHAIR: A LITTLE
PERSONAL GROOMING: A LITTLE
TURNING FROM BACK TO SIDE WHILE IN FLAT BAD: A LITTLE
MOVING FROM LYING ON BACK TO SITTING ON SIDE OF FLAT BED WITH BEDRAILS: A LITTLE
DRESSING REGULAR LOWER BODY CLOTHING: A LITTLE
HELP NEEDED FOR BATHING: A LITTLE

## 2023-10-07 ASSESSMENT — PAIN - FUNCTIONAL ASSESSMENT
PAIN_FUNCTIONAL_ASSESSMENT: 0-10
PAIN_FUNCTIONAL_ASSESSMENT: 0-10

## 2023-10-07 ASSESSMENT — PAIN SCALES - GENERAL
PAINLEVEL_OUTOF10: 0 - NO PAIN
PAINLEVEL_OUTOF10: 0 - NO PAIN

## 2023-10-07 NOTE — PROGRESS NOTES
Internal Medicine - Daily Progress Note    Name: Christie Arias  ROOM: 241/241-A  Nurse:No care team member to display    AGE: 68 y.o.    GENDER: female MRN: [id number]  Code: DNR and No Intubation        Subjective:    Overnight events:   She was noticed to be anxious yesterday evening and required a dose of zyprexa  She was seen and examined at bed side this AM  She appears to be SOB, on high flow ox, which was subsequently changed to bipap/vair 60/90.  She denies chest pain, diaphoresis, LoC, abd pain, changes in speech/vision or bowel/urinary habits    Objective:    Vitals:   Vitals:    10/07/23 0800 10/07/23 0852 10/07/23 1115 10/07/23 1500   BP:       BP Location:       Patient Position:       Pulse:       Resp:       Temp:       TempSrc:       SpO2: 97% 97% 90% 92%   Weight:       Height:            Physical Examination:       Labs:   @LABRCNTIP(WBC:3,HGB:3,MCV:3,PLT:3,HCT:3)  Results from last 7 days   Lab Units 10/07/23  0731 10/06/23  0614 10/05/23  0602 10/03/23  0528 10/02/23  1029 10/02/23  0018 10/02/23  0016   SODIUM mmol/L 138 139 139   < >  --    < >  --    POTASSIUM mmol/L 3.3* 3.3* 3.5   < >  --    < >  --    BUN mg/dL 18 23 17   < >  --    < >  --    CREATININE mg/dL 0.88 1.06* 0.85   < >  --    < >  --    CHLORIDE mmol/L 103 104 105   < >  --    < >  --    POCT HCO3 CALCULATED, VENOUS mmol/L  --   --   --   --  20.0*  --  26.6*   MAGNESIUM mg/dL 1.65 1.86 1.72   < >  --    < >  --    PHOSPHORUS mg/dL 3.3 3.4 2.8   < >  --    < >  --     < > = values in this interval not displayed.     Results from last 7 days   Lab Units 10/05/23  0247 10/02/23  0327 10/02/23  0018   TROPHS ng/L 870* 1,201* 2,074*   BNP pg/mL  --   --  885*       Imaging:   XR chest 1 view  Narrative: Interpreted By:  Liz Huang,   STUDY:  XR CHEST 1 VIEW;  10/6/2023 11:31 am      INDICATION:  Signs/Symptoms:SOB.      COMPARISON:  10/01/2023      ACCESSION NUMBER(S):  [id number]      ORDERING CLINICIAN:  JEWELS ALMARAZ     "  FINDINGS:  Patient is rotated. Right lower chest inadvertently not fully  included. Artifact from overlying monitoring leads. Curvilinear  lucent presumed skin fold overlies the central chest. New homogeneous  opacification of the left upper chest and basilar density with  blunting of the costophrenic angle. Also new hazy right lower chest  opacity. Cardiac silhouette within normal limits for size. Multifocal  presumed vascular calcifications.      Impression: Limited, rotated portable view of the chest. homogeneous left upper  chest infiltrate or loculated pleural effusion and bibasilar  infiltrates and/or pleural effusions, new from 10/01/2023. Continued  follow-up recommended.      MACRO:  None.      Signed by: Liz Huang 10/7/2023 9:25 AM  Dictation workstation:   [id number]      Microbiology:   Susceptibility data from last 90 days.  Collected Specimen Info Organism   10/02/23 Blood culture from Peripheral Venipuncture Staphylococcus epidermidis                    No lab exists for component: \"AGALPCRNB\"   .ID  Lab Results   Component Value Date    URINECULTURE Enterococcus faecalis (A) 04/12/2023    BLOODCULT No growth at 3 days 10/03/2023    BLOODCULT No growth at 3 days 10/03/2023       Medications:    Scheduled Medications:   [Held by provider] amLODIPine, 10 mg, oral, Daily  atorvastatin, 80 mg, oral, Nightly  enoxaparin, 30 mg, subcutaneous, q24h  [Held by provider] ferrous sulfate, 65 mg of iron, oral, Daily with breakfast  fluticasone furoate-vilanteroL, 1 puff, inhalation, Daily  ipratropium-albuteroL, 3 mL, nebulization, TID  labetaloL, 10 mg, intravenous, Once  melatonin, 5 mg, oral, Nightly  mirtazapine, 15 mg, oral, q24h  oxygen, , inhalation, Continuous - 02/gases  pantoprazole, 40 mg, oral, Daily before breakfast  potassium chloride, 20 mEq, oral, BID  predniSONE, 40 mg, oral, Daily  QUEtiapine, 12.5 mg, oral, Nightly  sertraline, 25 mg, oral, Daily  tiotropium, 2 puff, inhalation, " Daily        Continuous Medications:        PRN Medications:   PRN medications: acetaminophen **OR** [DISCONTINUED] acetaminophen **OR** [DISCONTINUED] acetaminophen, docusate sodium, guaiFENesin, ipratropium-albuteroL, ondansetron ODT **OR** [DISCONTINUED] ondansetron, oxygen, polyethylene glycol     Assessment and Plan:    Problem list:  Problem List Items Addressed This Visit       Respiratory failure (CMS/[facility]) - Primary     Other Visit Diagnoses       COPD exacerbation (CMS/[facility])        Pneumonia due to infectious organism, unspecified laterality, unspecified part of lung        Sepsis, due to unspecified organism, unspecified whether acute organ dysfunction present (CMS/[facility])                Christie Arias68 y.o. 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        Acute Issues:   #Acute hypoxic and hypercapneic respiratory failure, likely 2/2 COPD exacerbation  #LLL mucous plugging and atelectasis. Worsening sinc  #R/o PNA  - Worsening SOB requiring bipap/airvo 60/90.   - Less likely infection given no fever and interval improvement of LLL opacification/mucous plugging on CT PE. Patient with recurrent episodes during hospitalization of hypoxia, requiring placement on BiPAP with quick weans to NC. Unclear etiology as COPD exacerbation is being addressed. Could consider CHRISTELLE as these events happen overnight.  - S/p Vanc and Zosyn (10/1 - 10/2) and vanc (10/3 - 10/5), s/p solumedrol in ED, Procal 0.16, CRP and ESR WNL  - Continue Prednisone 40mg (10/2 - current)  - continue Scheduled Duonebs TID and PRN, Spiriva, Breo  - continue Bronchial hygiene (acapella) and incentive spirometry; chest physiotherapy  - Keep SPO2 above 88%, wean O2 as tolerated  - Per Pulm, If no improvement in resp status, repeat CXR if no improvement in left lung  collapse will schedule bronchoscopy.  will Consider re- starting zosyn if symptoms persists. Will possibly have a bronch next week.   - ID consulted advised to continue supportive management, not likely pneumonia     # Hypo magnesemia  -Serum magnesium 1.65- repleted  -Will continue to monitor    #Hypo kalemia  -Serum potassium 3.3- Repleted  -Will continue to monitor    #Acute on chronic anemia likely 2/2 JIE, stable  Less likely UGIB given no clinical signs or MDS given only RBCs affected  - Hgb on admission: 6.0 (baseline 9.0 to 7.8), MCV 74 with FOBT positive   - Iron studies (10/2/2022): iron 18, % sat 4, TIBC 418, ferritin 19  - EGD (11/2022) unremarkable; Colonoscopy (11/2022): diverticulosis in sigmoid colon   - Normal BUN:Cr ratio  - s/p 2 units of blood transfusion, venofer 10/2  - Cont IV protonix 40mg daily  - Hold PO iron supplementation per GI recs as it may produce black stools  - Plan for OP heme onc follow-up     #Failure to thrive  - consult nutrition, PT/OT following.      Resolved Issues:   #Positive blood cx, likely contaminant  - Blood cx 1/2 sets (10/2) positive for Staph epidermidis  - CRP, ESR WNL  - Repeat blood cx (10/3) no growth to date  - Consult ID, recs appreciated  - Cont vanc pending repeat cultures (10/3 - current)     #Non-MI Troponin elevation   - EKG on admission: Sinus tachycardia with no ST or T wave morphologies  - Echo (07/2023): Diastolic dysfunction with LVEF 60%  - Trop: 2074->1201  - Pt on telemetry  - Monitor Hgb and transfuse if < 7.0    Chronic Issues:   #HTN: hold home amlodipine 10mg given soft BPs  #MDD: cont zoloft and remeron  #Anxiety: start seroquel 12.5mg at bedtime    F: None  E: Replete as needed  N: Regular diet  G: Protonix  VTE: Lovenux  Abx; None    Code status: DNR and No Intubation   Disposition: 68 y.o. female who was admitted for Respiratory failure (CMS/[facility]) [J96.90]  COPD exacerbation (CMS/[facility]) [J44.1]  Pneumonia due to infectious organism,  unspecified laterality, unspecified part of lung [J18.9]  Sepsis, due to unspecified organism, unspecified whether acute organ dysfunction present (CMS/[facility]) [A41.9], currently been managed for the above.Still has worsening SOB, being managed steroids and bronchial hygiene. Pulm following, will possible need a bronch.       Quang Corley MD

## 2023-10-07 NOTE — PROGRESS NOTES
Christie Arias is a 68 y.o. female on day 5 of admission presenting with Chronic obstructive pulmonary disease (CMS/HCC).    Subjective   Interval History: no fever, no new complaints        Review of Systems    Objective   Range of Vitals (last 24 hours)  Heart Rate:  []   Temp:  [36.2 °C (97.2 °F)-36.8 °C (98.2 °F)]   Resp:  [20-26]   BP: (116-119)/(83-93)   SpO2:  [90 %-99 %]   Daily Weight  10/02/23 : 48.3 kg (106 lb 7.7 oz)    Body mass index is 15.72 kg/m².    Physical Exam  Constitutional:       Appearance: Normal appearance.   HENT:      Head: Normocephalic and atraumatic.      Mouth/Throat:      Mouth: Mucous membranes are moist.      Pharynx: Oropharynx is clear.   Eyes:      Pupils: Pupils are equal, round, and reactive to light.   Cardiovascular:      Rate and Rhythm: Normal rate and regular rhythm.      Heart sounds: Normal heart sounds.   Pulmonary:      Effort: Pulmonary effort is normal.      Breath sounds: Normal breath sounds.      Comments: Scattered crackles  Abdominal:      General: Abdomen is flat. Bowel sounds are normal.      Palpations: Abdomen is soft.   Musculoskeletal:      Cervical back: Normal range of motion.   Neurological:      Mental Status: She is alert.         Antibiotics  sodium chloride 0.9 % bolus 500 mL  methylPREDNISolone sod succinate (PF) (SOLU-Medrol) injection 125 mg  piperacillin-tazobactam-dextrose (Zosyn) IV 4.5 g  vancomycin in dextrose 5 % (Vancocin) IVPB 1 g  oxygen (O2) therapy  sodium chloride 0.9 % bolus 1,572 mL  iohexol (OMNIPaque) 350 mg iodine/mL injection 100 mL  oxygen (O2) therapy  ondansetron ODT (Zofran-ODT) disintegrating tablet 4 mg  ondansetron (Zofran) injection 4 mg  acetaminophen (Tylenol) tablet 650 mg  acetaminophen (Tylenol) oral liquid 650 mg  acetaminophen (Tylenol) suppository 650 mg  pantoprazole (ProtoNix) injection 80 mg  pantoprazole (ProtoNix) injection 40 mg  ipratropium-albuteroL (Duo-Neb) 0.5-2.5 mg/3 mL nebulizer solution  3 mL  piperacillin-tazobactam-dextrose (Zosyn) IV 3.375 g  predniSONE (Deltasone) tablet 40 mg  vancomycin in dextrose 5 % (Vancocin) IVPB 500 mg  ipratropium-albuteroL (Duo-Neb) 0.5-2.5 mg/3 mL nebulizer solution 3 mL  ipratropium-albuteroL (Duo-Neb) 0.5-2.5 mg/3 mL nebulizer solution 3 mL  ipratropium-albuteroL (Duo-Neb) 0.5-2.5 mg/3 mL nebulizer solution 3 mL  tiotropium (Spiriva Respimat) 2.5 mcg/actuation inhaler 2 puff  fluticasone furoate-vilanteroL (Breo Ellipta) 100-25 mcg/dose inhaler 1 puff  amLODIPine (Norvasc) tablet  aspirin EC tablet      traMADol (Ultram) tablet  pantoprazole (ProtoNix) EC tablet  mirtazapine (Remeron) tablet      nitroglycerin (Nitrostat) SL tablet  potassium chloride SA (Klor-Con M20) ER tablet  sertraline (Zoloft) tablet  acetaminophen (Tylenol) tablet  ondansetron (Zofran) tablet  acetaminophen (Tylenol) tablet 325 mg  amLODIPine (Norvasc) tablet 10 mg  atorvastatin (Lipitor) tablet 80 mg  guaiFENesin (Robitussin) 100 mg/5 mL syrup 100 mg  mirtazapine (Remeron) tablet 15 mg  sertraline (Zoloft) tablet 25 mg  potassium chloride IV 20 mEq  oxygen (O2) therapy  ipratropium-albuteroL (Duo-Neb) 0.5-2.5 mg/3 mL nebulizer solution 3 mL  ferrous sulfate 325 (65 Fe) MG tablet 65 mg of iron  oxygen (O2) therapy  iron sucrose (Venofer) 200 mg in sodium chloride 0.9% 100 mL IVPB  melatonin tablet 5 mg  ipratropium-albuteroL (Duo-Neb) 0.5-2.5 mg/3 mL nebulizer solution 3 mL  enoxaparin (Lovenox) syringe 50 mg  piperacillin-tazobactam-dextrose (Zosyn) IV 4.5 g  enoxaparin (Lovenox) syringe 30 mg  piperacillin-tazobactam-dextrose (Zosyn) IV 3.375 g  vancomycin in dextrose 5 % (Vancocin) IVPB 1,000 mg  enoxaparin (Lovenox) syringe 30 mg  ipratropium-albuteroL (Duo-Neb) 0.5-2.5 mg/3 mL nebulizer solution 3 mL  OLANZapine (ZyPREXA) injection 5 mg  potassium chloride IV 20 mEq  potassium chloride (Klor-Con) packet 20 mEq  metoprolol tartrate (Lopressor) 5 mg/5 mL injection  - Omnicell Override  Pull  metoprolol tartrate (Lopressor) injection 5 mg  oxygen (O2) therapy  polyethylene glycol (Glycolax, Miralax) packet 17 g  docusate sodium (Colace) capsule 100 mg  potassium chloride IV 20 mEq  magnesium oxide (Mag-Ox) tablet 400 mg  OLANZapine (ZyPREXA) injection 5 mg  QUEtiapine (SEROquel) tablet 12.5 mg  pantoprazole (ProtoNix) EC tablet 40 mg  oxygen (O2) therapy  ipratropium-albuteroL (Duo-Neb) 0.5-2.5 mg/3 mL nebulizer solution 3 mL  oxygen (O2) therapy  oxygen (O2) therapy  potassium chloride IV 20 mEq  predniSONE (Deltasone) tablet 40 mg  OLANZapine (ZyPREXA) injection 5 mg  labetaloL (Normodyne,Trandate) injection 10 mg  oxygen (O2) therapy  magnesium sulfate IV 2 g  potassium chloride (Klor-Con) packet 20 mEq      Relevant Results  Labs  Results from last 72 hours   Lab Units 10/07/23  0731 10/06/23  0614 10/05/23  0603   WBC AUTO x10*3/uL 11.3 9.4 10.6   HEMOGLOBIN g/dL 9.3* 8.7* 8.3*   HEMATOCRIT % 31.1* 28.9* 27.3*   PLATELETS AUTO x10*3/uL 400 350 380     Results from last 72 hours   Lab Units 10/07/23  0731 10/06/23  0614 10/05/23  0602   SODIUM mmol/L 138 139 139   POTASSIUM mmol/L 3.3* 3.3* 3.5   CHLORIDE mmol/L 103 104 105   CO2 mmol/L 25 24 25   BUN mg/dL 18 23 17   CREATININE mg/dL 0.88 1.06* 0.85   GLUCOSE mg/dL 84 89 83   CALCIUM mg/dL 8.1* 8.3* 8.1*   ANION GAP mmol/L 13 14 13   EGFR mL/min/1.73m*2 71 57* 74   PHOSPHORUS mg/dL 3.3 3.4 2.8     Results from last 72 hours   Lab Units 10/07/23  0731 10/06/23  0614 10/05/23  0602   ALBUMIN g/dL 3.4 3.3* 3.3*     Estimated Creatinine Clearance: 45.4 mL/min (by C-G formula based on SCr of 0.88 mg/dL).  C-Reactive Protein   Date Value Ref Range Status   10/03/2023 0.84 <1.00 mg/dL Final     Microbiology  Susceptibility data from last 14 days.  Collected Specimen Info Organism   10/02/23 Blood culture from Peripheral Venipuncture Staphylococcus epidermidis   Imaging  Reviewed      Assessment/Plan   Bacteremia, CNS, the repeat BC is  negative  Respiratory failure / atelectasis, intermittent hypoxia is likely recurrent atelectasis  Failure to thrive     Recommendations :  Supportive care        I spent  minutes in the professional and overall care of this patient.         Leslye Cortez MD

## 2023-10-07 NOTE — PROGRESS NOTES
Department of Medicine  Division of Pulmonary, Critical Care, and Sleep Medicine    Christie Arias is a 68 y.o. female on day 5 of admission presenting with Chronic obstructive pulmonary disease (CMS/HCC).    Subjective   Worsening hypoxia, on bipap/airvo today.       Objective     Vital Signs      10/6/2023    12:21 AM 10/6/2023     4:50 AM 10/6/2023     5:59 AM 10/6/2023    10:31 AM 10/6/2023     3:24 PM 10/6/2023     7:37 PM 10/7/2023     5:24 AM   Vitals   Systolic 102  106 131 116 119 119   Diastolic 72  85 93 89 93 83   Heart Rate 83  89 110 99 128 97   Temp 35.6 °C (96.1 °F)  36.5 °C (97.7 °F) 37.1 °C (98.8 °F) 36.8 °C (98.2 °F) 36.2 °C (97.2 °F) 36.3 °C (97.3 °F)   Resp  26 25 24 26 26 20        Physical Exam  Constitutional:       Comments: Acutely ill looking, awake and alert.    HENT:      Head: Normocephalic and atraumatic.      Nose: Nose normal.      Mouth/Throat:      Pharynx: Oropharynx is clear.   Eyes:      Pupils: Pupils are equal, round, and reactive to light.   Cardiovascular:      Rate and Rhythm: Normal rate and regular rhythm.   Pulmonary:      Effort: Pulmonary effort is normal.      Breath sounds: No wheezing or rhonchi.      Comments: Coarse breath sounds bilaterally, no wheezing  Abdominal:      General: There is no distension.      Palpations: Abdomen is soft.      Tenderness: There is no abdominal tenderness.   Musculoskeletal:      Cervical back: Neck supple.      Right lower leg: No edema.      Left lower leg: No edema.   Neurological:      Mental Status: She is oriented to person, place, and time.   Psychiatric:         Mood and Affect: Mood normal.         Behavior: Behavior normal.          Labs:  Lab Results   Component Value Date    WBC 11.3 10/07/2023    HGB 9.3 (L) 10/07/2023    HCT 31.1 (L) 10/07/2023    MCV 81 10/07/2023     10/07/2023      Lab Results   Component Value Date    GLUCOSE 84 10/07/2023    CALCIUM 8.1 (L) 10/07/2023     10/07/2023    K 3.3 (L)  10/07/2023    CO2 25 10/07/2023     10/07/2023    BUN 18 10/07/2023    CREATININE 0.88 10/07/2023      Lab Results   Component Value Date    ALT 18 10/02/2023    AST 27 10/02/2023    ALKPHOS 121 10/02/2023    BILITOT 0.5 10/02/2023        Oxygen Therapy  SpO2: 93 %  Oxygen Therapy: Supplemental oxygen  O2 Delivery Method: High flow nasal cannula (increased pt. to 60L 90% due to pt. desatting to 79%. placed NRB on top, SpO2 back up to 90%)  FiO2 (%):  [80 %-90 %] 80 %    Intake/Output last 3 Shifts:  I/O last 3 completed shifts:  In: 1220 (25.3 mL/kg) [P.O.:1020; IV Piggyback:200]  Out: 1251 (25.9 mL/kg) [Urine:1250 (0.7 mL/kg/hr); Stool:1]  Weight: 48.3 kg       Medications   Scheduled medications  [Held by provider] amLODIPine, 10 mg, oral, Daily  atorvastatin, 80 mg, oral, Nightly  enoxaparin, 30 mg, subcutaneous, q24h  [Held by provider] ferrous sulfate, 65 mg of iron, oral, Daily with breakfast  fluticasone furoate-vilanteroL, 1 puff, inhalation, Daily  ipratropium-albuteroL, 3 mL, nebulization, TID  labetaloL, 10 mg, intravenous, Once  melatonin, 5 mg, oral, Nightly  mirtazapine, 15 mg, oral, q24h  oxygen, , inhalation, Continuous - 02/gases  pantoprazole, 40 mg, oral, Daily before breakfast  potassium chloride, 20 mEq, oral, BID  predniSONE, 40 mg, oral, Daily  QUEtiapine, 12.5 mg, oral, Nightly  sertraline, 25 mg, oral, Daily  tiotropium, 2 puff, inhalation, Daily      Continuous medications     PRN medications  PRN medications: acetaminophen **OR** [DISCONTINUED] acetaminophen **OR** [DISCONTINUED] acetaminophen, docusate sodium, guaiFENesin, ipratropium-albuteroL, ondansetron ODT **OR** [DISCONTINUED] ondansetron, oxygen, polyethylene glycol     Allergies  Codeine    Chest Radiograph   CT angio chest for pulmonary embolism 10/02/2023    Narrative  STUDY:  CT Angiogram of the Chest; 10/2/2023 at 2:14 a.m.  INDICATION:  Pulmonary embolism evaluation.  COMPARISON:  One-view CXR 10/1/2023.  CTA CAP  8/15/2023.  ACCESSION NUMBER(S):  [id number]  ORDERING CLINICIAN:  AZUL TERESA  TECHNIQUE:  CTA of the chest was performed with intravenous contrast.  Images are reviewed and processed at a workstation according to the CT  angiogram protocol with 3-D and/or MIP post processing imaging  generated.  Omnipaque 350 100 mL was administered intravenously.  Automated mA/kV exposure control was utilized and patient examination  was performed in strict accordance with principles of ALARA.  FINDINGS:  Pulmonary arteries are adequately opacified without acute or chronic  filling defects.  Distal thoracic aorta measures 4.3 cm unchanged.  The heart is normal in size without pericardial effusion.  Thoracic  lymph nodes are not enlarged.  There is no pleural effusion, pleural thickening, or pneumothorax.  Improved left lower lobe mucous plugging with residual left basilar  atelectasis.  No evidence of central endobronchial lesion.  Severe  emphysema.  Small amount of right basilar atelectasis.  Lungs are  clear without consolidation, interstitial disease, or suspicious  nodules.  Upper abdomen demonstrates no acute pathology.  There are no acute fractures.  No suspicious bony lesions.    Impression  No acute pulmonary embolism  Distal thoracic aortic aneurysm unchanged measuring 4.3 cm  Improved left lower lobe mucous plugging and postobstructive  atelectasis since the prior.  Signed by Son Singh MD       XR chest 1 view 10/06/2023    Narrative  Interpreted By:  Liz Huang,  STUDY:  XR CHEST 1 VIEW;  10/6/2023 11:31 am    INDICATION:  Signs/Symptoms:SOB.    COMPARISON:  10/01/2023    ACCESSION NUMBER(S):  [id number]    ORDERING CLINICIAN:  JEWELS ALMARAZ    FINDINGS:  Patient is rotated. Right lower chest inadvertently not fully  included. Artifact from overlying monitoring leads. Curvilinear  lucent presumed skin fold overlies the central chest. New homogeneous  opacification of the left upper chest and basilar  density with  blunting of the costophrenic angle. Also new hazy right lower chest  opacity. Cardiac silhouette within normal limits for size. Multifocal  presumed vascular calcifications.    Impression  Limited, rotated portable view of the chest. homogeneous left upper  chest infiltrate or loculated pleural effusion and bibasilar  infiltrates and/or pleural effusions, new from 10/01/2023. Continued  follow-up recommended.    MACRO:  None.    Signed by: Liz Huang 10/7/2023 9:25 AM  Dictation workstation:   [id number]      XR chest 1 view 10/01/2023    Narrative  STUDY:  Chest Radiograph;  10/1/2023 11:13PM  INDICATION:  Shortness of breath.  COMPARISON:  XR chest 8/4/2023  ACCESSION NUMBER(S):  [id number]  ORDERING CLINICIAN:  AZUL TERESA  TECHNIQUE:  Frontal chest was obtained at 22:57 hours.  FINDINGS:  CARDIOMEDIASTINAL SILHOUETTE:  Cardiomediastinal silhouette is normal in size and configuration.    LUNGS:  Lungs are clear.  Diffuse chronic parenchymal lung changes are again  apparent.  No large pleural effusion.  No pneumothorax    ABDOMEN:  No remarkable upper abdominal findings.    BONES:  No acute osseous changes.    Impression  No acute pulmonary abnormality.  Signed by Joseph Mcnair MD         Assessment and Plan / Recommendations   Assessment/Plan   68-year-old female with history of hypertension, CVA, COPD admitted with acute hypoxic respiratory failure     1.  Acute hypoxic respiratory failure due to COPD exacerbation, suspected pneumonia, mucous plugging and atelectasis  2.  COPD exacerbation  3.  Suspected pneumonia/mucous plugging and atelectasis-patient with left lower lobe consolidation and atelectasis persistent/worsening since at least April of this year    Worsening requiring bipap/airvo 60/90.      -Restart prednisone 40 mg daily  -Continue nebs, Breo  -continue bronchial hygiene with Incentive spirometry, Acapella, vest, wean O2 as tolerated.    -repeat CXR if no improvement in left  lung collapse will schedule bronchoscopy.    -start kita Pat MD

## 2023-10-07 NOTE — CARE PLAN
The patient's goals for the shift include more stamina    The clinical goals for the shift include wean O2    Over the shift, the patient did not make progress toward the following goals. Barriers to progression include lack of motivation. Recommendations to address these barriers include encouragement.

## 2023-10-07 NOTE — PROGRESS NOTES
Physical Therapy                 Therapy Communication Note    Patient Name: Christie Arias  MRN: [id number]  Today's Date: 10/7/2023     Discipline: Physical Therapy    Missed Visit Reason: Missed Visit Reason: Other (Comment) (Per nurse (Mara) patient is not appropriate for therapy today. Patient's SPO2 de stats to 84% without activity. Nurse place patient on Bi-pap. Missed visit @ 1236)    Missed Time: Attempt    Comment:

## 2023-10-07 NOTE — CARE PLAN
Problem: Skin  Goal: Prevent/manage excess moisture  10/7/2023 1433 by Mara Grider RN  Outcome: Progressing  10/7/2023 1427 by Mara Grider RN  Outcome: Progressing   The patient's goals for the shift include more stamina    The clinical goals for the shift include wean O2      Problem: Skin  Goal: Participates in plan/prevention/treatment measures  10/7/2023 1433 by Mara Grider RN  Outcome: Progressing  10/7/2023 1427 by Mara Grider RN  Outcome: Progressing

## 2023-10-08 ENCOUNTER — APPOINTMENT (OUTPATIENT)
Dept: RADIOLOGY | Facility: HOSPITAL | Age: 68
DRG: 871 | End: 2023-10-08
Payer: MEDICAID

## 2023-10-08 LAB
ALBUMIN SERPL BCP-MCNC: 3.5 G/DL (ref 3.4–5)
ANION GAP SERPL CALC-SCNC: 15 MMOL/L (ref 10–20)
BACTERIA BLD CULT: NORMAL
BACTERIA BLD CULT: NORMAL
BASE EXCESS BLDV CALC-SCNC: 3.7 MMOL/L (ref -2–3)
BODY TEMPERATURE: 37 DEGREES CELSIUS
BUN SERPL-MCNC: 18 MG/DL (ref 6–23)
CALCIUM SERPL-MCNC: 8.3 MG/DL (ref 8.6–10.3)
CHLORIDE SERPL-SCNC: 98 MMOL/L (ref 98–107)
CO2 SERPL-SCNC: 25 MMOL/L (ref 21–32)
CREAT SERPL-MCNC: 1 MG/DL (ref 0.5–1.05)
ERYTHROCYTE [DISTWIDTH] IN BLOOD BY AUTOMATED COUNT: 22.9 % (ref 11.5–14.5)
FLOW: 50 LPM
GFR SERPL CREATININE-BSD FRML MDRD: 61 ML/MIN/1.73M*2
GLUCOSE SERPL-MCNC: 81 MG/DL (ref 74–99)
HCO3 BLDV-SCNC: 24.7 MMOL/L (ref 22–26)
HCT VFR BLD AUTO: 33.2 % (ref 36–46)
HGB BLD-MCNC: 10 G/DL (ref 12–16)
INHALED O2 CONCENTRATION: 80 %
MCH RBC QN AUTO: 23.9 PG (ref 26–34)
MCHC RBC AUTO-ENTMCNC: 30.1 G/DL (ref 32–36)
MCV RBC AUTO: 79 FL (ref 80–100)
NRBC BLD-RTO: 0 /100 WBCS (ref 0–0)
OXYHGB MFR BLDV: 92.8 % (ref 45–75)
PCO2 BLDV: 27 MM HG (ref 41–51)
PH BLDV: 7.57 PH (ref 7.33–7.43)
PHOSPHATE SERPL-MCNC: 3.5 MG/DL (ref 2.5–4.9)
PLATELET # BLD AUTO: 406 X10*3/UL (ref 150–450)
PMV BLD AUTO: 10.7 FL (ref 7.5–11.5)
PO2 BLDV: 67 MM HG (ref 35–45)
POTASSIUM SERPL-SCNC: 3.1 MMOL/L (ref 3.5–5.3)
RBC # BLD AUTO: 4.19 X10*6/UL (ref 4–5.2)
SAO2 % BLDV: 97 % (ref 45–75)
SODIUM SERPL-SCNC: 135 MMOL/L (ref 136–145)
TEST COMMENT: ABNORMAL
WBC # BLD AUTO: 13.6 X10*3/UL (ref 4.4–11.3)

## 2023-10-08 PROCEDURE — 71045 X-RAY EXAM CHEST 1 VIEW: CPT | Performed by: RADIOLOGY

## 2023-10-08 PROCEDURE — 96372 THER/PROPH/DIAG INJ SC/IM: CPT

## 2023-10-08 PROCEDURE — 2500000002 HC RX 250 W HCPCS SELF ADMINISTERED DRUGS (ALT 637 FOR MEDICARE OP, ALT 636 FOR OP/ED): Performed by: INTERNAL MEDICINE

## 2023-10-08 PROCEDURE — 99233 SBSQ HOSP IP/OBS HIGH 50: CPT | Performed by: INTERNAL MEDICINE

## 2023-10-08 PROCEDURE — 94667 MNPJ CHEST WALL 1ST: CPT

## 2023-10-08 PROCEDURE — 71045 X-RAY EXAM CHEST 1 VIEW: CPT

## 2023-10-08 PROCEDURE — 2500000001 HC RX 250 WO HCPCS SELF ADMINISTERED DRUGS (ALT 637 FOR MEDICARE OP)

## 2023-10-08 PROCEDURE — 2500000004 HC RX 250 GENERAL PHARMACY W/ HCPCS (ALT 636 FOR OP/ED): Performed by: INTERNAL MEDICINE

## 2023-10-08 PROCEDURE — 36415 COLL VENOUS BLD VENIPUNCTURE: CPT

## 2023-10-08 PROCEDURE — 94660 CPAP INITIATION&MGMT: CPT

## 2023-10-08 PROCEDURE — 99232 SBSQ HOSP IP/OBS MODERATE 35: CPT

## 2023-10-08 PROCEDURE — 82805 BLOOD GASES W/O2 SATURATION: CPT

## 2023-10-08 PROCEDURE — 94668 MNPJ CHEST WALL SBSQ: CPT

## 2023-10-08 PROCEDURE — 2500000004 HC RX 250 GENERAL PHARMACY W/ HCPCS (ALT 636 FOR OP/ED)

## 2023-10-08 PROCEDURE — 94640 AIRWAY INHALATION TREATMENT: CPT

## 2023-10-08 PROCEDURE — 80069 RENAL FUNCTION PANEL: CPT

## 2023-10-08 PROCEDURE — 2060000001 HC INTERMEDIATE ICU ROOM DAILY

## 2023-10-08 PROCEDURE — 85027 COMPLETE CBC AUTOMATED: CPT

## 2023-10-08 RX ORDER — POTASSIUM CHLORIDE 14.9 MG/ML
20 INJECTION INTRAVENOUS ONCE
Status: COMPLETED | OUTPATIENT
Start: 2023-10-08 | End: 2023-10-08

## 2023-10-08 RX ADMIN — DOCUSATE SODIUM 100 MG: 100 CAPSULE, LIQUID FILLED ORAL at 21:10

## 2023-10-08 RX ADMIN — POTASSIUM CHLORIDE 20 MEQ: 1.5 POWDER, FOR SOLUTION ORAL at 09:22

## 2023-10-08 RX ADMIN — IPRATROPIUM BROMIDE AND ALBUTEROL SULFATE 3 ML: 2.5; .5 SOLUTION RESPIRATORY (INHALATION) at 08:04

## 2023-10-08 RX ADMIN — PIPERACILLIN SODIUM AND TAZOBACTAM SODIUM 4.5 G: 4; .5 INJECTION, SOLUTION INTRAVENOUS at 23:13

## 2023-10-08 RX ADMIN — PIPERACILLIN SODIUM AND TAZOBACTAM SODIUM 4.5 G: 4; .5 INJECTION, SOLUTION INTRAVENOUS at 06:12

## 2023-10-08 RX ADMIN — PIPERACILLIN SODIUM AND TAZOBACTAM SODIUM 4.5 G: 4; .5 INJECTION, SOLUTION INTRAVENOUS at 17:21

## 2023-10-08 RX ADMIN — PANTOPRAZOLE SODIUM 40 MG: 40 TABLET, DELAYED RELEASE ORAL at 06:11

## 2023-10-08 RX ADMIN — FLUTICASONE FUROATE AND VILANTEROL TRIFENATATE 1 PUFF: 100; 25 POWDER RESPIRATORY (INHALATION) at 09:29

## 2023-10-08 RX ADMIN — PREDNISONE 40 MG: 20 TABLET ORAL at 09:21

## 2023-10-08 RX ADMIN — ATORVASTATIN CALCIUM 80 MG: 80 TABLET, FILM COATED ORAL at 21:08

## 2023-10-08 RX ADMIN — Medication 5 MG: at 21:09

## 2023-10-08 RX ADMIN — PIPERACILLIN SODIUM AND TAZOBACTAM SODIUM 4.5 G: 4; .5 INJECTION, SOLUTION INTRAVENOUS at 11:31

## 2023-10-08 RX ADMIN — POTASSIUM CHLORIDE 20 MEQ: 1.5 POWDER, FOR SOLUTION ORAL at 21:10

## 2023-10-08 RX ADMIN — POTASSIUM CHLORIDE 20 MEQ: 14.9 INJECTION, SOLUTION INTRAVENOUS at 13:52

## 2023-10-08 RX ADMIN — MIRTAZAPINE 15 MG: 15 TABLET, FILM COATED ORAL at 21:09

## 2023-10-08 RX ADMIN — ENOXAPARIN SODIUM 30 MG: 30 INJECTION SUBCUTANEOUS at 13:52

## 2023-10-08 RX ADMIN — IPRATROPIUM BROMIDE AND ALBUTEROL SULFATE 3 ML: 2.5; .5 SOLUTION RESPIRATORY (INHALATION) at 14:43

## 2023-10-08 RX ADMIN — SERTRALINE HYDROCHLORIDE 25 MG: 50 TABLET ORAL at 09:22

## 2023-10-08 RX ADMIN — IPRATROPIUM BROMIDE AND ALBUTEROL SULFATE 3 ML: 2.5; .5 SOLUTION RESPIRATORY (INHALATION) at 19:48

## 2023-10-08 RX ADMIN — QUETIAPINE FUMARATE 12.5 MG: 25 TABLET ORAL at 21:09

## 2023-10-08 RX ADMIN — Medication 50 L/MIN: at 08:04

## 2023-10-08 RX ADMIN — TIOTROPIUM BROMIDE INHALATION SPRAY 2 PUFF: 3.12 SPRAY, METERED RESPIRATORY (INHALATION) at 06:12

## 2023-10-08 ASSESSMENT — COGNITIVE AND FUNCTIONAL STATUS - GENERAL
DRESSING REGULAR LOWER BODY CLOTHING: A LOT
CLIMB 3 TO 5 STEPS WITH RAILING: TOTAL
PERSONAL GROOMING: A LITTLE
MOBILITY SCORE: 15
WALKING IN HOSPITAL ROOM: A LOT
TOILETING: A LOT
DAILY ACTIVITIY SCORE: 15
DRESSING REGULAR UPPER BODY CLOTHING: A LITTLE
MOVING FROM LYING ON BACK TO SITTING ON SIDE OF FLAT BED WITH BEDRAILS: A LITTLE
MOVING TO AND FROM BED TO CHAIR: A LITTLE
PERSONAL GROOMING: A LITTLE
HELP NEEDED FOR BATHING: A LOT
DRESSING REGULAR LOWER BODY CLOTHING: A LITTLE
TOILETING: A LITTLE
TURNING FROM BACK TO SIDE WHILE IN FLAT BAD: A LITTLE
WALKING IN HOSPITAL ROOM: A LITTLE
STANDING UP FROM CHAIR USING ARMS: A LITTLE
MOVING TO AND FROM BED TO CHAIR: A LITTLE
EATING MEALS: A LITTLE
STANDING UP FROM CHAIR USING ARMS: A LOT
MOVING FROM LYING ON BACK TO SITTING ON SIDE OF FLAT BED WITH BEDRAILS: A LITTLE
MOBILITY SCORE: 16
TURNING FROM BACK TO SIDE WHILE IN FLAT BAD: A LITTLE
DAILY ACTIVITIY SCORE: 18
DRESSING REGULAR UPPER BODY CLOTHING: A LITTLE
CLIMB 3 TO 5 STEPS WITH RAILING: A LOT
EATING MEALS: A LITTLE
HELP NEEDED FOR BATHING: A LITTLE

## 2023-10-08 ASSESSMENT — PAIN SCALES - GENERAL
PAINLEVEL_OUTOF10: 0 - NO PAIN
PAINLEVEL_OUTOF10: 0 - NO PAIN

## 2023-10-08 ASSESSMENT — PAIN - FUNCTIONAL ASSESSMENT
PAIN_FUNCTIONAL_ASSESSMENT: 0-10
PAIN_FUNCTIONAL_ASSESSMENT: 0-10

## 2023-10-08 NOTE — CARE PLAN
The patient's goals for the shift include more stamina    The clinical goals for the shift include patient will remain HDS throughout shift.    Patient had uneventful night. At 600 required more supplemental oxygen, placed non rebreather on, reacher 93%, took it off

## 2023-10-08 NOTE — CARE PLAN
The patient's goals for the shift include prevent skin injuries    The clinical goals for the shift include prevent/minimize sheer/friction injuries    Over the shift, the patient was  kept free from sheer/friction injury

## 2023-10-08 NOTE — PROGRESS NOTES
Department of Medicine  Division of Pulmonary, Critical Care, and Sleep Medicine    Christie Arias is a 68 y.o. female on day 6 of admission presenting with Chronic obstructive pulmonary disease (CMS/HCC).    Subjective   Worsening hypoxia, on bipap/airvo today.       Objective     Vital Signs      10/6/2023     7:37 PM 10/7/2023     5:24 AM 10/7/2023     9:00 AM 10/7/2023     4:51 PM 10/7/2023     8:29 PM 10/8/2023     6:29 AM 10/8/2023     9:17 AM   Vitals   Systolic 119 119 114 117  146 91   Diastolic 93 83 79 93  98 73   Heart Rate 128 97 108 103 99 116 97   Temp 36.2 °C (97.2 °F) 36.3 °C (97.3 °F) 36.5 °C (97.7 °F) 36.2 °C (97.2 °F) 37.3 °C (99.1 °F) 36.7 °C (98.1 °F) 37 °C (98.6 °F)   Resp 26 20 24 20 17 27 24        Physical Exam  Constitutional:       Comments: Acutely ill looking, awake and alert.    HENT:      Head: Normocephalic and atraumatic.      Nose: Nose normal.      Mouth/Throat:      Pharynx: Oropharynx is clear.   Eyes:      Pupils: Pupils are equal, round, and reactive to light.   Cardiovascular:      Rate and Rhythm: Normal rate and regular rhythm.   Pulmonary:      Effort: Pulmonary effort is normal.      Breath sounds: No wheezing or rhonchi.      Comments: Coarse breath sounds bilaterally, no wheezing  Abdominal:      General: There is no distension.      Palpations: Abdomen is soft.      Tenderness: There is no abdominal tenderness.   Musculoskeletal:      Cervical back: Neck supple.      Right lower leg: No edema.      Left lower leg: No edema.   Neurological:      Mental Status: She is oriented to person, place, and time.   Psychiatric:         Mood and Affect: Mood normal.         Behavior: Behavior normal.          Labs:  Lab Results   Component Value Date    WBC 13.6 (H) 10/08/2023    HGB 10.0 (L) 10/08/2023    HCT 33.2 (L) 10/08/2023    MCV 79 (L) 10/08/2023     10/08/2023      Lab Results   Component Value Date    GLUCOSE 81 10/08/2023    CALCIUM 8.3 (L) 10/08/2023    NA  135 (L) 10/08/2023    K 3.1 (L) 10/08/2023    CO2 25 10/08/2023    CL 98 10/08/2023    BUN 18 10/08/2023    CREATININE 1.00 10/08/2023      Lab Results   Component Value Date    ALT 18 10/02/2023    AST 27 10/02/2023    ALKPHOS 121 10/02/2023    BILITOT 0.5 10/02/2023        Oxygen Therapy  SpO2: 97 %  Oxygen Therapy: Supplemental oxygen  O2 Delivery Method: High flow nasal cannula (pt. remains on 50L 80%. SpO2 94%. unable to wean pt. at this time.)  FiO2 (%):  [80 %] 80 %    Intake/Output last 3 Shifts:  I/O last 3 completed shifts:  In: 641 (13.3 mL/kg) [P.O.:240; I.V.:201 (4.2 mL/kg); IV Piggyback:200]  Out: 2700 (55.9 mL/kg) [Urine:2700 (1.6 mL/kg/hr)]  Weight: 48.3 kg       Medications   Scheduled medications  [Held by provider] amLODIPine, 10 mg, oral, Daily  atorvastatin, 80 mg, oral, Nightly  enoxaparin, 30 mg, subcutaneous, q24h  [Held by provider] ferrous sulfate, 65 mg of iron, oral, Daily with breakfast  fluticasone furoate-vilanteroL, 1 puff, inhalation, Daily  ipratropium-albuteroL, 3 mL, nebulization, TID  labetaloL, 10 mg, intravenous, Once  melatonin, 5 mg, oral, Nightly  mirtazapine, 15 mg, oral, q24h  oxygen, , inhalation, Continuous - 02/gases  pantoprazole, 40 mg, oral, Daily before breakfast  piperacillin-tazobactam, 4.5 g, intravenous, q6h  potassium chloride, 20 mEq, oral, BID  predniSONE, 40 mg, oral, Daily  QUEtiapine, 12.5 mg, oral, Nightly  sertraline, 25 mg, oral, Daily  tiotropium, 2 puff, inhalation, Daily      Continuous medications     PRN medications  PRN medications: acetaminophen **OR** [DISCONTINUED] acetaminophen **OR** [DISCONTINUED] acetaminophen, docusate sodium, guaiFENesin, ipratropium-albuteroL, ondansetron ODT **OR** [DISCONTINUED] ondansetron, oxygen, polyethylene glycol     Allergies  Codeine    Chest Radiograph   CT angio chest for pulmonary embolism 10/02/2023    Narrative  STUDY:  CT Angiogram of the Chest; 10/2/2023 at 2:14 a.m.  INDICATION:  Pulmonary embolism  evaluation.  COMPARISON:  One-view CXR 10/1/2023.  CTA CAP 8/15/2023.  ACCESSION NUMBER(S):  [id number]  ORDERING CLINICIAN:  AZUL TERESA  TECHNIQUE:  CTA of the chest was performed with intravenous contrast.  Images are reviewed and processed at a workstation according to the CT  angiogram protocol with 3-D and/or MIP post processing imaging  generated.  Omnipaque 350 100 mL was administered intravenously.  Automated mA/kV exposure control was utilized and patient examination  was performed in strict accordance with principles of ALARA.  FINDINGS:  Pulmonary arteries are adequately opacified without acute or chronic  filling defects.  Distal thoracic aorta measures 4.3 cm unchanged.  The heart is normal in size without pericardial effusion.  Thoracic  lymph nodes are not enlarged.  There is no pleural effusion, pleural thickening, or pneumothorax.  Improved left lower lobe mucous plugging with residual left basilar  atelectasis.  No evidence of central endobronchial lesion.  Severe  emphysema.  Small amount of right basilar atelectasis.  Lungs are  clear without consolidation, interstitial disease, or suspicious  nodules.  Upper abdomen demonstrates no acute pathology.  There are no acute fractures.  No suspicious bony lesions.    Impression  No acute pulmonary embolism  Distal thoracic aortic aneurysm unchanged measuring 4.3 cm  Improved left lower lobe mucous plugging and postobstructive  atelectasis since the prior.  Signed by Son Singh MD       XR chest 1 view 10/06/2023    Narrative  Interpreted By:  Liz Huang,  STUDY:  XR CHEST 1 VIEW;  10/6/2023 11:31 am    INDICATION:  Signs/Symptoms:SOB.    COMPARISON:  10/01/2023    ACCESSION NUMBER(S):  [id number]    ORDERING CLINICIAN:  JEWELS ALMARAZ    FINDINGS:  Patient is rotated. Right lower chest inadvertently not fully  included. Artifact from overlying monitoring leads. Curvilinear  lucent presumed skin fold overlies the central chest. New  homogeneous  opacification of the left upper chest and basilar density with  blunting of the costophrenic angle. Also new hazy right lower chest  opacity. Cardiac silhouette within normal limits for size. Multifocal  presumed vascular calcifications.    Impression  Limited, rotated portable view of the chest. homogeneous left upper  chest infiltrate or loculated pleural effusion and bibasilar  infiltrates and/or pleural effusions, new from 10/01/2023. Continued  follow-up recommended.    MACRO:  None.    Signed by: Liz Huang 10/7/2023 9:25 AM  Dictation workstation:   [id number]      XR chest 1 view 10/01/2023    Narrative  STUDY:  Chest Radiograph;  10/1/2023 11:13PM  INDICATION:  Shortness of breath.  COMPARISON:  XR chest 8/4/2023  ACCESSION NUMBER(S):  [id number]  ORDERING CLINICIAN:  AZUL TERESA  TECHNIQUE:  Frontal chest was obtained at 22:57 hours.  FINDINGS:  CARDIOMEDIASTINAL SILHOUETTE:  Cardiomediastinal silhouette is normal in size and configuration.    LUNGS:  Lungs are clear.  Diffuse chronic parenchymal lung changes are again  apparent.  No large pleural effusion.  No pneumothorax    ABDOMEN:  No remarkable upper abdominal findings.    BONES:  No acute osseous changes.    Impression  No acute pulmonary abnormality.  Signed by Joseph Mcnair MD         Assessment and Plan / Recommendations   Assessment/Plan   68-year-old female with history of hypertension, CVA, COPD admitted with acute hypoxic respiratory failure     1.  Acute hypoxic respiratory failure due to COPD exacerbation, suspected pneumonia, mucous plugging and atelectasis  2.  COPD exacerbation  3.  Suspected pneumonia/mucous plugging and atelectasis-patient with left lower lobe consolidation and atelectasis persistent/worsening since at least April of this year    Hypoxia slightly better today, on 50 L / 80% Airvo     -Continue prednisone 40 mg daily  -Continue nebs, Breo  -continue bronchial hygiene with Incentive spirometry,  Acapella, vest, wean O2 as tolerated.  Repeat chest x-ray, if still showing lung collapse we will schedule bronc.  -Continue Alee Pat MD

## 2023-10-08 NOTE — PROGRESS NOTES
Christie Arias is a 68 y.o. female on day 6 of admission presenting with Chronic obstructive pulmonary disease (CMS/HCC).    Subjective   Interval History: no fever, the hx is limited        Review of Systems    Objective   Range of Vitals (last 24 hours)  Heart Rate:  []   Temp:  [36.2 °C (97.2 °F)-37.3 °C (99.1 °F)]   Resp:  [17-27]   BP: ()/(73-98)   SpO2:  [90 %-97 %]   Daily Weight  10/02/23 : 48.3 kg (106 lb 7.7 oz)    Body mass index is 15.72 kg/m².    Physical Exam  Constitutional:       Appearance: Normal appearance.   HENT:      Head: Normocephalic and atraumatic.      Mouth/Throat:      Mouth: Mucous membranes are moist.      Pharynx: Oropharynx is clear.   Eyes:      Pupils: Pupils are equal, round, and reactive to light.   Cardiovascular:      Rate and Rhythm: Normal rate and regular rhythm.      Heart sounds: Normal heart sounds.   Pulmonary:      Effort: Pulmonary effort is normal.      Breath sounds: Normal breath sounds.      Comments: Scattered crackles  Abdominal:      General: Abdomen is flat. Bowel sounds are normal.      Palpations: Abdomen is soft.   Musculoskeletal:      Cervical back: Normal range of motion.   Neurological:      Mental Status: She is alert.         Antibiotics  sodium chloride 0.9 % bolus 500 mL  methylPREDNISolone sod succinate (PF) (SOLU-Medrol) injection 125 mg  piperacillin-tazobactam-dextrose (Zosyn) IV 4.5 g  vancomycin in dextrose 5 % (Vancocin) IVPB 1 g  oxygen (O2) therapy  sodium chloride 0.9 % bolus 1,572 mL  iohexol (OMNIPaque) 350 mg iodine/mL injection 100 mL  oxygen (O2) therapy  ondansetron ODT (Zofran-ODT) disintegrating tablet 4 mg  ondansetron (Zofran) injection 4 mg  acetaminophen (Tylenol) tablet 650 mg  acetaminophen (Tylenol) oral liquid 650 mg  acetaminophen (Tylenol) suppository 650 mg  pantoprazole (ProtoNix) injection 80 mg  pantoprazole (ProtoNix) injection 40 mg  ipratropium-albuteroL (Duo-Neb) 0.5-2.5 mg/3 mL nebulizer solution  3 mL  piperacillin-tazobactam-dextrose (Zosyn) IV 3.375 g  predniSONE (Deltasone) tablet 40 mg  vancomycin in dextrose 5 % (Vancocin) IVPB 500 mg  ipratropium-albuteroL (Duo-Neb) 0.5-2.5 mg/3 mL nebulizer solution 3 mL  ipratropium-albuteroL (Duo-Neb) 0.5-2.5 mg/3 mL nebulizer solution 3 mL  ipratropium-albuteroL (Duo-Neb) 0.5-2.5 mg/3 mL nebulizer solution 3 mL  tiotropium (Spiriva Respimat) 2.5 mcg/actuation inhaler 2 puff  fluticasone furoate-vilanteroL (Breo Ellipta) 100-25 mcg/dose inhaler 1 puff  amLODIPine (Norvasc) tablet  aspirin EC tablet      traMADol (Ultram) tablet  pantoprazole (ProtoNix) EC tablet  mirtazapine (Remeron) tablet      nitroglycerin (Nitrostat) SL tablet  potassium chloride SA (Klor-Con M20) ER tablet  sertraline (Zoloft) tablet  acetaminophen (Tylenol) tablet  ondansetron (Zofran) tablet  acetaminophen (Tylenol) tablet 325 mg  amLODIPine (Norvasc) tablet 10 mg  atorvastatin (Lipitor) tablet 80 mg  guaiFENesin (Robitussin) 100 mg/5 mL syrup 100 mg  mirtazapine (Remeron) tablet 15 mg  sertraline (Zoloft) tablet 25 mg  potassium chloride IV 20 mEq  oxygen (O2) therapy  ipratropium-albuteroL (Duo-Neb) 0.5-2.5 mg/3 mL nebulizer solution 3 mL  ferrous sulfate 325 (65 Fe) MG tablet 65 mg of iron  oxygen (O2) therapy  iron sucrose (Venofer) 200 mg in sodium chloride 0.9% 100 mL IVPB  melatonin tablet 5 mg  ipratropium-albuteroL (Duo-Neb) 0.5-2.5 mg/3 mL nebulizer solution 3 mL  enoxaparin (Lovenox) syringe 50 mg  piperacillin-tazobactam-dextrose (Zosyn) IV 4.5 g  enoxaparin (Lovenox) syringe 30 mg  piperacillin-tazobactam-dextrose (Zosyn) IV 3.375 g  vancomycin in dextrose 5 % (Vancocin) IVPB 1,000 mg  enoxaparin (Lovenox) syringe 30 mg  ipratropium-albuteroL (Duo-Neb) 0.5-2.5 mg/3 mL nebulizer solution 3 mL  OLANZapine (ZyPREXA) injection 5 mg  potassium chloride IV 20 mEq  potassium chloride (Klor-Con) packet 20 mEq  metoprolol tartrate (Lopressor) 5 mg/5 mL injection  - Omnicell Override  Pull  metoprolol tartrate (Lopressor) injection 5 mg  oxygen (O2) therapy  polyethylene glycol (Glycolax, Miralax) packet 17 g  docusate sodium (Colace) capsule 100 mg  potassium chloride IV 20 mEq  magnesium oxide (Mag-Ox) tablet 400 mg  OLANZapine (ZyPREXA) injection 5 mg  QUEtiapine (SEROquel) tablet 12.5 mg  pantoprazole (ProtoNix) EC tablet 40 mg  oxygen (O2) therapy  ipratropium-albuteroL (Duo-Neb) 0.5-2.5 mg/3 mL nebulizer solution 3 mL  oxygen (O2) therapy  oxygen (O2) therapy  potassium chloride IV 20 mEq  predniSONE (Deltasone) tablet 40 mg  OLANZapine (ZyPREXA) injection 5 mg  labetaloL (Normodyne,Trandate) injection 10 mg  oxygen (O2) therapy  magnesium sulfate IV 2 g  potassium chloride (Klor-Con) packet 20 mEq  piperacillin-tazobactam-dextrose (Zosyn) IV 4.5 g      Relevant Results  Labs  Results from last 72 hours   Lab Units 10/08/23  0826 10/07/23  0731 10/06/23  0614   WBC AUTO x10*3/uL 13.6* 11.3 9.4   HEMOGLOBIN g/dL 10.0* 9.3* 8.7*   HEMATOCRIT % 33.2* 31.1* 28.9*   PLATELETS AUTO x10*3/uL 406 400 350     Results from last 72 hours   Lab Units 10/08/23  0826 10/07/23  0731 10/06/23  0614   SODIUM mmol/L 135* 138 139   POTASSIUM mmol/L 3.1* 3.3* 3.3*   CHLORIDE mmol/L 98 103 104   CO2 mmol/L 25 25 24   BUN mg/dL 18 18 23   CREATININE mg/dL 1.00 0.88 1.06*   GLUCOSE mg/dL 81 84 89   CALCIUM mg/dL 8.3* 8.1* 8.3*   ANION GAP mmol/L 15 13 14   EGFR mL/min/1.73m*2 61 71 57*   PHOSPHORUS mg/dL 3.5 3.3 3.4     Results from last 72 hours   Lab Units 10/08/23  0826 10/07/23  0731 10/06/23  0614   ALBUMIN g/dL 3.5 3.4 3.3*     Estimated Creatinine Clearance: 39.9 mL/min (by C-G formula based on SCr of 1 mg/dL).  C-Reactive Protein   Date Value Ref Range Status   10/03/2023 0.84 <1.00 mg/dL Final     Microbiology  Susceptibility data from last 14 days.  Collected Specimen Info Organism   10/02/23 Blood culture from Peripheral Venipuncture Coagulase negative staphylococcus     Imaging      Assessment/Plan    Bacteremia, CNS, the repeat BC is negative  Respiratory failure / atelectasis, intermittent hypoxia is likely recurrent atelectasis, O2 remains a challenge  Failure to thrive     Recommendations :  Rstarted on Zosyn  Will likely need BAL if it can be done     I spent  minutes in the professional and overall care of this patient.      Leslye Cortez MD

## 2023-10-08 NOTE — PROGRESS NOTES
Internal Medicine - Daily Progress Note- Hospital Day: 8     Name: Christie Arias  ROOM: 241/241-A  Nurse:No care team member to display    AGE: 68 y.o.    GENDER: female MRN: [id number]  Code: DNR and No Intubation        Subjective:    Overnight events:     The patient was seen and examined at bedside while she was having breakfast.She has no new complains.   She denies fever, LOC, diaphoresis, changes in speech/vision, chest pain, or changes in bowel/urinary habits  She is still on high flow o2,  VBG overnight showed PH 7.5, Pco2, RT attempted to wean her off o2 yesterday night, she kept sating in the low 80s and was subsequently placed on hgh flow o2.   Also she was restarted on zosyn yesterday as per pulm rec.     Objective:    Vitals:   Vitals:    10/07/23 2233 10/08/23 0330 10/08/23 0629 10/08/23 0804   BP:   (!) 146/98    BP Location:       Patient Position:   Lying    Pulse:   (!) 116    Resp:   (!) 27    Temp:   36.7 °C (98.1 °F)    TempSrc:   Temporal    SpO2: 93% 93% 92% 94%   Weight:       Height:            Intake/Output Summary (Last 24 hours) at 10/8/2023 0903  Last data filed at 10/8/2023 0612  Gross per 24 hour   Intake 401 ml   Output 1950 ml   Net -1549 ml        Physical Examination:   GE; sitting comfortably in bed, in mild resp distress, unable to make full sentences  HEENT; AT/NC, no conjunctival pallor, anicteric sclera  Neck; supple no LAD  CVS s1 s2  Chest; reduced air entry on the Rt  Abd; soft, NT/ND  Ext; no pedal edema  Neuro; Aox3    Labs:   @LABRCNTIP(WBC:3,HGB:3,MCV:3,PLT:3,HCT:3)  Results from last 7 days   Lab Units 10/08/23  0542 10/07/23  0731 10/06/23  0614 10/05/23  0602 10/03/23  0528 10/02/23  1029 10/02/23  0018 10/02/23  0016   SODIUM mmol/L  --  138 139 139   < >  --    < >  --    POTASSIUM mmol/L  --  3.3* 3.3* 3.5   < >  --    < >  --    BUN mg/dL  --  18 23 17   < >  --    < >  --    CREATININE mg/dL  --  0.88 1.06* 0.85   < >  --    < >  --    CHLORIDE mmol/L  --   "103 104 105   < >  --    < >  --    POCT HCO3 CALCULATED, VENOUS mmol/L 24.7  --   --   --   --  20.0*  --  26.6*   MAGNESIUM mg/dL  --  1.65 1.86 1.72   < >  --    < >  --    PHOSPHORUS mg/dL  --  3.3 3.4 2.8   < >  --    < >  --     < > = values in this interval not displayed.     Results from last 7 days   Lab Units 10/05/23  0247 10/02/23  0327 10/02/23  0018   TROPHS ng/L 870* 1,201* 2,074*   BNP pg/mL  --   --  885*       Imaging:   XR chest 1 view  Narrative: Interpreted By:  Liz Huang,   STUDY:  XR CHEST 1 VIEW;  10/6/2023 11:31 am      INDICATION:  Signs/Symptoms:SOB.      COMPARISON:  10/01/2023      ACCESSION NUMBER(S):  [id number]      ORDERING CLINICIAN:  JEWELS ALMARAZ      FINDINGS:  Patient is rotated. Right lower chest inadvertently not fully  included. Artifact from overlying monitoring leads. Curvilinear  lucent presumed skin fold overlies the central chest. New homogeneous  opacification of the left upper chest and basilar density with  blunting of the costophrenic angle. Also new hazy right lower chest  opacity. Cardiac silhouette within normal limits for size. Multifocal  presumed vascular calcifications.      Impression: Limited, rotated portable view of the chest. homogeneous left upper  chest infiltrate or loculated pleural effusion and bibasilar  infiltrates and/or pleural effusions, new from 10/01/2023. Continued  follow-up recommended.      MACRO:  None.      Signed by: Liz Huang 10/7/2023 9:25 AM  Dictation workstation:   [id number]      Microbiology:   Susceptibility data from last 90 days.  Collected Specimen Info Organism   10/02/23 Blood culture from Peripheral Venipuncture Coagulase negative staphylococcus                    No lab exists for component: \"AGALPCRNB\"   .ID  Lab Results   Component Value Date    URINECULTURE Enterococcus faecalis (A) 04/12/2023    BLOODCULT No growth at 4 days -  FINAL REPORT 10/03/2023    BLOODCULT No growth at 4 days -  FINAL REPORT 10/03/2023 "       Medications:    Scheduled Medications:   [Held by provider] amLODIPine, 10 mg, oral, Daily  atorvastatin, 80 mg, oral, Nightly  enoxaparin, 30 mg, subcutaneous, q24h  [Held by provider] ferrous sulfate, 65 mg of iron, oral, Daily with breakfast  fluticasone furoate-vilanteroL, 1 puff, inhalation, Daily  ipratropium-albuteroL, 3 mL, nebulization, TID  labetaloL, 10 mg, intravenous, Once  melatonin, 5 mg, oral, Nightly  mirtazapine, 15 mg, oral, q24h  oxygen, , inhalation, Continuous - 02/gases  pantoprazole, 40 mg, oral, Daily before breakfast  piperacillin-tazobactam, 4.5 g, intravenous, q6h  potassium chloride, 20 mEq, oral, BID  predniSONE, 40 mg, oral, Daily  QUEtiapine, 12.5 mg, oral, Nightly  sertraline, 25 mg, oral, Daily  tiotropium, 2 puff, inhalation, Daily        Continuous Medications:        PRN Medications:   PRN medications: acetaminophen **OR** [DISCONTINUED] acetaminophen **OR** [DISCONTINUED] acetaminophen, docusate sodium, guaiFENesin, ipratropium-albuteroL, ondansetron ODT **OR** [DISCONTINUED] ondansetron, oxygen, polyethylene glycol     Assessment and Plan:    Problem list:  Problem List Items Addressed This Visit       Respiratory failure (CMS/[facility]) - Primary     Other Visit Diagnoses       COPD exacerbation (CMS/[facility])        Pneumonia due to infectious organism, unspecified laterality, unspecified part of lung        Sepsis, due to unspecified organism, unspecified whether acute organ dysfunction present (CMS/[facility])                Christie Arias68 y.o. 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 recently started on antibiotic Zosyn (10/7-).  Pulm following, suggested bronch for tomorrow in the setting of collapse lung.     Acute Issues:   #Acute hypoxic and hypercapneic respiratory failure, likely 2/2 COPD  exacerbation  #LLL mucous plugging and atelectasis. Worsening sinc  #R/o PNA  -  requiring bipap/airvo 60/90à 50L/ 80% Airvo  - Less likely infection given no fever and interval improvement of LLL opacification/mucous plugging on CT PE. Patient with recurrent episodes during hospitalization of hypoxia, requiring placement on BiPAP with quick weans to NC. Unclear etiology as COPD exacerbation is being addressed. Could consider CHRISTELLE as these events happen overnight.  - S/p Vanc and Zosyn (10/1 - 10/2) and vanc (10/3 - 10/5), s/p solumedrol in ED, Procal 0.16, CRP and ESR WNL  - Continue Prednisone 40mg (10/2 - current)  - continue Scheduled Duonebs TID and PRN, Spiriva, Breo  - continue Bronchial hygiene (acapella) and incentive spirometry; chest physiotherapy  - Keep SPO2 above 88%, wean O2 as tolerated  - Per Pulm, Repeat cxr showed lung collapse, the patient is have a bronch tomorrow. NPO at midnight    # Hypokalemia  -Serum potassium 3.1 today, repleted today.   -Will continue to monitor    # Hypomagnesemia  -Serum magnesium 1.65- repleted  -Will continue to monitor     #Acute on chronic anemia likely 2/2 JIE, stable  Less likely UGIB given no clinical signs or MDS given only RBCs affected  - Hgb on admission: 6.0 (baseline 9.0 to 7.8), MCV 74 with FOBT positive   - Iron studies (10/2/2022): iron 18, % sat 4, TIBC 418, ferritin 19  - EGD (11/2022) unremarkable; Colonoscopy (11/2022): diverticulosis in sigmoid colon   - s/p 2 units of blood transfusion, venofer 10/2  - Cont IV protonix 40mg daily  - Hold PO iron supplementation per GI recs as it may produce black stools  - Plan for OP heme onc follow-up after discharge.      #Failure to thrive  - consult nutrition, PT/OT following.     Resolved Issues:   #Positive blood cx, likely contaminant  - Blood cx 1/2 sets (10/2) positive for Staph epidermidis  - CRP, ESR WNL  - Repeat blood cx (10/3) no growth to date  - Consult ID, recs appreciated  - Cont vanc pending  repeat cultures (10/3 - current)     #Non-MI Troponin elevation   - EKG on admission: Sinus tachycardia with no ST or T wave morphologies  - Echo (07/2023): Diastolic dysfunction with LVEF 60%  - Trop: 2074->1201  - Pt on telemetry  - Monitor Hgb and transfuse if < 7.0     Chronic Issues:   #HTN: hold home amlodipine 10mg given soft BPs  #MDD: cont zoloft and remeron  #Anxiety: start seroquel 12.5mg at bedtime    F: None  E: Replete as needed  N: Adult diet Regular   G: None  VTE: Lovenux    Code status: DNR and No Intubation   Disposition: 68 y.o. female who was admitted for Respiratory failure (CMS/[facility]) [J96.90]  COPD exacerbation (CMS/[facility]) [J44.1]  Pneumonia due to infectious organism, unspecified laterality, unspecified part of lung [J18.9]  Sepsis, due to unspecified organism, unspecified whether acute organ dysfunction present (CMS/[facility]) [A41.9]. Pulm follwing,will need probably need Bronch tomorrow.       Quang Corley MD

## 2023-10-08 NOTE — CARE PLAN
The patient's goals for the shift include keep O2 sat 92% and above    The clinical goals for the shift include wean O2     Pt. Remains on Airvo 50L 80% however did not require any supplemental O2 this shift. Sat 94%

## 2023-10-09 ENCOUNTER — ANESTHESIA EVENT (OUTPATIENT)
Dept: OPERATING ROOM | Facility: HOSPITAL | Age: 68
DRG: 871 | End: 2023-10-09
Payer: MEDICAID

## 2023-10-09 ENCOUNTER — ANESTHESIA (OUTPATIENT)
Dept: OPERATING ROOM | Facility: HOSPITAL | Age: 68
DRG: 871 | End: 2023-10-09
Payer: MEDICAID

## 2023-10-09 ENCOUNTER — APPOINTMENT (OUTPATIENT)
Dept: RADIOLOGY | Facility: HOSPITAL | Age: 68
DRG: 871 | End: 2023-10-09
Payer: MEDICAID

## 2023-10-09 ENCOUNTER — APPOINTMENT (OUTPATIENT)
Dept: OPERATING ROOM | Facility: HOSPITAL | Age: 68
DRG: 871 | End: 2023-10-09
Payer: MEDICAID

## 2023-10-09 ENCOUNTER — HOSPITAL ENCOUNTER (OUTPATIENT)
Dept: CARDIOLOGY | Facility: HOSPITAL | Age: 68
Discharge: HOME | End: 2023-10-09
Payer: MEDICAID

## 2023-10-09 PROBLEM — J18.9 PNEUMONIA: Status: ACTIVE | Noted: 2023-10-09

## 2023-10-09 LAB
ACANTHOCYTES BLD QL SMEAR: NORMAL
ALBUMIN SERPL BCP-MCNC: 3.1 G/DL (ref 3.4–5)
ANION GAP BLDV CALCULATED.4IONS-SCNC: 8 MMOL/L (ref 10–25)
ANION GAP SERPL CALC-SCNC: 17 MMOL/L (ref 10–20)
BASE EXCESS BLDV CALC-SCNC: 2.6 MMOL/L (ref -2–3)
BASOPHILS # BLD AUTO: 0.01 X10*3/UL (ref 0–0.1)
BASOPHILS NFR BLD AUTO: 0.1 %
BODY TEMPERATURE: 37 DEGREES CELSIUS
BUN SERPL-MCNC: 24 MG/DL (ref 6–23)
BURR CELLS BLD QL SMEAR: NORMAL
CA-I BLDV-SCNC: 1.1 MMOL/L (ref 1.1–1.33)
CALCIUM SERPL-MCNC: 8.3 MG/DL (ref 8.6–10.3)
CHLORIDE BLDV-SCNC: 103 MMOL/L (ref 98–107)
CHLORIDE SERPL-SCNC: 101 MMOL/L (ref 98–107)
CO2 SERPL-SCNC: 24 MMOL/L (ref 21–32)
CREAT SERPL-MCNC: 1.01 MG/DL (ref 0.5–1.05)
EOSINOPHIL # BLD AUTO: 0.03 X10*3/UL (ref 0–0.7)
EOSINOPHIL NFR BLD AUTO: 0.2 %
ERYTHROCYTE [DISTWIDTH] IN BLOOD BY AUTOMATED COUNT: 22.8 % (ref 11.5–14.5)
FLOW: 40 LPM
GFR SERPL CREATININE-BSD FRML MDRD: 60 ML/MIN/1.73M*2
GLUCOSE BLDV-MCNC: 93 MG/DL (ref 74–99)
GLUCOSE SERPL-MCNC: 87 MG/DL (ref 74–99)
HCO3 BLDV-SCNC: 25.9 MMOL/L (ref 22–26)
HCT VFR BLD AUTO: 28.6 % (ref 36–46)
HCT VFR BLD EST: 27 % (ref 36–46)
HGB BLD-MCNC: 8.7 G/DL (ref 12–16)
HGB BLDV-MCNC: 9.1 G/DL (ref 12–16)
HYPOCHROMIA BLD QL SMEAR: NORMAL
IMM GRANULOCYTES # BLD AUTO: 0.07 X10*3/UL (ref 0–0.7)
IMM GRANULOCYTES NFR BLD AUTO: 0.5 % (ref 0–0.9)
INHALED O2 CONCENTRATION: 70 %
LACTATE BLDV-SCNC: 1.5 MMOL/L (ref 0.4–2)
LYMPHOCYTES # BLD AUTO: 1.7 X10*3/UL (ref 1.2–4.8)
LYMPHOCYTES NFR BLD AUTO: 13 %
MAGNESIUM SERPL-MCNC: 1.8 MG/DL (ref 1.6–2.4)
MCH RBC QN AUTO: 24 PG (ref 26–34)
MCHC RBC AUTO-ENTMCNC: 30.4 G/DL (ref 32–36)
MCV RBC AUTO: 79 FL (ref 80–100)
MONOCYTES # BLD AUTO: 1.31 X10*3/UL (ref 0.1–1)
MONOCYTES NFR BLD AUTO: 10 %
NEUTROPHILS # BLD AUTO: 9.98 X10*3/UL (ref 1.2–7.7)
NEUTROPHILS NFR BLD AUTO: 76.2 %
NRBC BLD-RTO: 0 /100 WBCS (ref 0–0)
OXYHGB MFR BLDV: 96.2 % (ref 45–75)
PCO2 BLDV: 34 MM HG (ref 41–51)
PH BLDV: 7.49 PH (ref 7.33–7.43)
PHOSPHATE SERPL-MCNC: 4.2 MG/DL (ref 2.5–4.9)
PLATELET # BLD AUTO: 349 X10*3/UL (ref 150–450)
PMV BLD AUTO: 11.1 FL (ref 7.5–11.5)
PO2 BLDV: 83 MM HG (ref 35–45)
POCT INTERNATIONAL NORMALIZATION RATIO: 1.1
POCT PROTHROMBIN TIME: NORMAL
POLYCHROMASIA BLD QL SMEAR: NORMAL
POTASSIUM BLDV-SCNC: 3.5 MMOL/L (ref 3.5–5.3)
POTASSIUM SERPL-SCNC: 3.6 MMOL/L (ref 3.5–5.3)
RBC # BLD AUTO: 3.63 X10*6/UL (ref 4–5.2)
RBC MORPH BLD: NORMAL
SAO2 % BLDV: 99 % (ref 45–75)
SCHISTOCYTES BLD QL SMEAR: NORMAL
SODIUM BLDV-SCNC: 133 MMOL/L (ref 136–145)
SODIUM SERPL-SCNC: 138 MMOL/L (ref 136–145)
TARGETS BLD QL SMEAR: NORMAL
WBC # BLD AUTO: 13.1 X10*3/UL (ref 4.4–11.3)

## 2023-10-09 PROCEDURE — 87070 CULTURE OTHR SPECIMN AEROBIC: CPT | Mod: CMCLAB,GEALAB | Performed by: INTERNAL MEDICINE

## 2023-10-09 PROCEDURE — 71045 X-RAY EXAM CHEST 1 VIEW: CPT

## 2023-10-09 PROCEDURE — A31645 PR BRONCHOSCOPY,RX ASPIR PULM TREE: Performed by: ANESTHESIOLOGY

## 2023-10-09 PROCEDURE — 99233 SBSQ HOSP IP/OBS HIGH 50: CPT | Performed by: INTERNAL MEDICINE

## 2023-10-09 PROCEDURE — 2500000005 HC RX 250 GENERAL PHARMACY W/O HCPCS: Performed by: NURSE ANESTHETIST, CERTIFIED REGISTERED

## 2023-10-09 PROCEDURE — A31645 PR BRONCHOSCOPY,RX ASPIR PULM TREE: Performed by: NURSE ANESTHETIST, CERTIFIED REGISTERED

## 2023-10-09 PROCEDURE — 2500000004 HC RX 250 GENERAL PHARMACY W/ HCPCS (ALT 636 FOR OP/ED): Performed by: NURSE ANESTHETIST, CERTIFIED REGISTERED

## 2023-10-09 PROCEDURE — 87102 FUNGUS ISOLATION CULTURE: CPT | Mod: CMCLAB,GEALAB | Performed by: INTERNAL MEDICINE

## 2023-10-09 PROCEDURE — 2060000001 HC INTERMEDIATE ICU ROOM DAILY

## 2023-10-09 PROCEDURE — 2500000002 HC RX 250 W HCPCS SELF ADMINISTERED DRUGS (ALT 637 FOR MEDICARE OP, ALT 636 FOR OP/ED): Performed by: INTERNAL MEDICINE

## 2023-10-09 PROCEDURE — 7100000002 HC RECOVERY ROOM TIME - EACH INCREMENTAL 1 MINUTE

## 2023-10-09 PROCEDURE — 84132 ASSAY OF SERUM POTASSIUM: CPT

## 2023-10-09 PROCEDURE — 87015 SPECIMEN INFECT AGNT CONCNTJ: CPT | Performed by: INTERNAL MEDICINE

## 2023-10-09 PROCEDURE — 3600000007 HC OR TIME - EACH INCREMENTAL 1 MINUTE - PROCEDURE LEVEL TWO

## 2023-10-09 PROCEDURE — 3600000002 HC OR TIME - INITIAL BASE CHARGE - PROCEDURE LEVEL TWO

## 2023-10-09 PROCEDURE — 94640 AIRWAY INHALATION TREATMENT: CPT

## 2023-10-09 PROCEDURE — 36415 COLL VENOUS BLD VENIPUNCTURE: CPT

## 2023-10-09 PROCEDURE — 2500000001 HC RX 250 WO HCPCS SELF ADMINISTERED DRUGS (ALT 637 FOR MEDICARE OP)

## 2023-10-09 PROCEDURE — 2500000004 HC RX 250 GENERAL PHARMACY W/ HCPCS (ALT 636 FOR OP/ED): Performed by: INTERNAL MEDICINE

## 2023-10-09 PROCEDURE — 71045 X-RAY EXAM CHEST 1 VIEW: CPT | Performed by: RADIOLOGY

## 2023-10-09 PROCEDURE — 93005 ELECTROCARDIOGRAM TRACING: CPT

## 2023-10-09 PROCEDURE — 96372 THER/PROPH/DIAG INJ SC/IM: CPT

## 2023-10-09 PROCEDURE — 83735 ASSAY OF MAGNESIUM: CPT

## 2023-10-09 PROCEDURE — 2500000004 HC RX 250 GENERAL PHARMACY W/ HCPCS (ALT 636 FOR OP/ED): Mod: JZ | Performed by: ANESTHESIOLOGY

## 2023-10-09 PROCEDURE — 93010 ELECTROCARDIOGRAM REPORT: CPT | Performed by: INTERNAL MEDICINE

## 2023-10-09 PROCEDURE — 3700000001 HC GENERAL ANESTHESIA TIME - INITIAL BASE CHARGE

## 2023-10-09 PROCEDURE — 3700000002 HC GENERAL ANESTHESIA TIME - EACH INCREMENTAL 1 MINUTE

## 2023-10-09 PROCEDURE — 99232 SBSQ HOSP IP/OBS MODERATE 35: CPT

## 2023-10-09 PROCEDURE — 94660 CPAP INITIATION&MGMT: CPT

## 2023-10-09 PROCEDURE — 85025 COMPLETE CBC W/AUTO DIFF WBC: CPT

## 2023-10-09 PROCEDURE — 7100000001 HC RECOVERY ROOM TIME - INITIAL BASE CHARGE

## 2023-10-09 PROCEDURE — 31645 BRNCHSC W/THER ASPIR 1ST: CPT | Performed by: INTERNAL MEDICINE

## 2023-10-09 PROCEDURE — 2500000004 HC RX 250 GENERAL PHARMACY W/ HCPCS (ALT 636 FOR OP/ED)

## 2023-10-09 PROCEDURE — 2580000001 HC RX 258 IV SOLUTIONS: Performed by: NURSE ANESTHETIST, CERTIFIED REGISTERED

## 2023-10-09 PROCEDURE — 87205 SMEAR GRAM STAIN: CPT | Mod: CMCLAB,GEALAB | Performed by: INTERNAL MEDICINE

## 2023-10-09 PROCEDURE — P9045 ALBUMIN (HUMAN), 5%, 250 ML: HCPCS | Mod: JZ | Performed by: ANESTHESIOLOGY

## 2023-10-09 RX ORDER — PROPOFOL 10 MG/ML
INJECTION, EMULSION INTRAVENOUS CONTINUOUS PRN
Status: DISCONTINUED | OUTPATIENT
Start: 2023-10-09 | End: 2023-10-09

## 2023-10-09 RX ORDER — ALBUMIN HUMAN 50 G/1000ML
12.5 SOLUTION INTRAVENOUS ONCE
Status: COMPLETED | OUTPATIENT
Start: 2023-10-09 | End: 2023-10-09

## 2023-10-09 RX ORDER — PHENYLEPHRINE HCL IN 0.9% NACL 0.4MG/10ML
SYRINGE (ML) INTRAVENOUS AS NEEDED
Status: DISCONTINUED | OUTPATIENT
Start: 2023-10-09 | End: 2023-10-09

## 2023-10-09 RX ORDER — DEXAMETHASONE SODIUM PHOSPHATE 4 MG/ML
INJECTION, SOLUTION INTRA-ARTICULAR; INTRALESIONAL; INTRAMUSCULAR; INTRAVENOUS; SOFT TISSUE AS NEEDED
Status: DISCONTINUED | OUTPATIENT
Start: 2023-10-09 | End: 2023-10-09

## 2023-10-09 RX ORDER — LIDOCAINE HCL/PF 100 MG/5ML
SYRINGE (ML) INTRAVENOUS AS NEEDED
Status: DISCONTINUED | OUTPATIENT
Start: 2023-10-09 | End: 2023-10-09

## 2023-10-09 RX ORDER — SUCCINYLCHOLINE CHLORIDE 20 MG/ML
INJECTION INTRAMUSCULAR; INTRAVENOUS AS NEEDED
Status: DISCONTINUED | OUTPATIENT
Start: 2023-10-09 | End: 2023-10-09

## 2023-10-09 RX ORDER — PROPOFOL 10 MG/ML
INJECTION, EMULSION INTRAVENOUS AS NEEDED
Status: DISCONTINUED | OUTPATIENT
Start: 2023-10-09 | End: 2023-10-09

## 2023-10-09 RX ORDER — LIDOCAINE HYDROCHLORIDE 20 MG/ML
INJECTION, SOLUTION INFILTRATION; PERINEURAL AS NEEDED
Status: DISCONTINUED | OUTPATIENT
Start: 2023-10-09 | End: 2023-10-09

## 2023-10-09 RX ORDER — ONDANSETRON HYDROCHLORIDE 2 MG/ML
INJECTION, SOLUTION INTRAVENOUS AS NEEDED
Status: DISCONTINUED | OUTPATIENT
Start: 2023-10-09 | End: 2023-10-09

## 2023-10-09 RX ADMIN — SUCCINYLCHOLINE CHLORIDE 100 MG: 20 INJECTION, SOLUTION INTRAMUSCULAR; INTRAVENOUS at 14:05

## 2023-10-09 RX ADMIN — PIPERACILLIN SODIUM AND TAZOBACTAM SODIUM 4.5 G: 4; .5 INJECTION, SOLUTION INTRAVENOUS at 05:06

## 2023-10-09 RX ADMIN — TIOTROPIUM BROMIDE INHALATION SPRAY 2 PUFF: 3.12 SPRAY, METERED RESPIRATORY (INHALATION) at 06:31

## 2023-10-09 RX ADMIN — FLUTICASONE FUROATE AND VILANTEROL TRIFENATATE 1 PUFF: 100; 25 POWDER RESPIRATORY (INHALATION) at 08:50

## 2023-10-09 RX ADMIN — Medication 120 MCG: at 14:30

## 2023-10-09 RX ADMIN — Medication 40 L/MIN: at 08:00

## 2023-10-09 RX ADMIN — ALBUMIN HUMAN 12.5 G: 0.05 INJECTION, SOLUTION INTRAVENOUS at 15:09

## 2023-10-09 RX ADMIN — PIPERACILLIN SODIUM AND TAZOBACTAM SODIUM 4.5 G: 4; .5 INJECTION, SOLUTION INTRAVENOUS at 16:53

## 2023-10-09 RX ADMIN — Medication 120 MCG: at 14:25

## 2023-10-09 RX ADMIN — Medication 120 MCG: at 14:21

## 2023-10-09 RX ADMIN — PROPOFOL 120 MCG/KG/MIN: 10 INJECTION, EMULSION INTRAVENOUS at 14:07

## 2023-10-09 RX ADMIN — ONDANSETRON 4 MG: 2 INJECTION INTRAMUSCULAR; INTRAVENOUS at 14:18

## 2023-10-09 RX ADMIN — SODIUM CHLORIDE, SODIUM LACTATE, POTASSIUM CHLORIDE, AND CALCIUM CHLORIDE: 600; 310; 30; 20 INJECTION, SOLUTION INTRAVENOUS at 13:53

## 2023-10-09 RX ADMIN — MIRTAZAPINE 15 MG: 15 TABLET, FILM COATED ORAL at 21:13

## 2023-10-09 RX ADMIN — IPRATROPIUM BROMIDE AND ALBUTEROL SULFATE 3 ML: 2.5; .5 SOLUTION RESPIRATORY (INHALATION) at 07:45

## 2023-10-09 RX ADMIN — PROPOFOL 60 MG: 10 INJECTION, EMULSION INTRAVENOUS at 14:04

## 2023-10-09 RX ADMIN — Medication 120 MCG: at 14:42

## 2023-10-09 RX ADMIN — IPRATROPIUM BROMIDE AND ALBUTEROL SULFATE 3 ML: 2.5; .5 SOLUTION RESPIRATORY (INHALATION) at 20:12

## 2023-10-09 RX ADMIN — PIPERACILLIN SODIUM AND TAZOBACTAM SODIUM 4.5 G: 4; .5 INJECTION, SOLUTION INTRAVENOUS at 11:42

## 2023-10-09 RX ADMIN — DEXAMETHASONE SODIUM PHOSPHATE 8 MG: 4 INJECTION, SOLUTION INTRAMUSCULAR; INTRAVENOUS at 14:11

## 2023-10-09 RX ADMIN — PANTOPRAZOLE SODIUM 40 MG: 40 TABLET, DELAYED RELEASE ORAL at 06:31

## 2023-10-09 RX ADMIN — Medication 80 MCG: at 14:13

## 2023-10-09 RX ADMIN — POTASSIUM CHLORIDE 20 MEQ: 1.5 POWDER, FOR SOLUTION ORAL at 08:49

## 2023-10-09 RX ADMIN — POTASSIUM CHLORIDE 20 MEQ: 1.5 POWDER, FOR SOLUTION ORAL at 21:13

## 2023-10-09 RX ADMIN — PIPERACILLIN SODIUM AND TAZOBACTAM SODIUM 4.5 G: 4; .5 INJECTION, SOLUTION INTRAVENOUS at 23:22

## 2023-10-09 RX ADMIN — ATORVASTATIN CALCIUM 80 MG: 80 TABLET, FILM COATED ORAL at 21:13

## 2023-10-09 RX ADMIN — ENOXAPARIN SODIUM 30 MG: 30 INJECTION SUBCUTANEOUS at 12:31

## 2023-10-09 RX ADMIN — LIDOCAINE HYDROCHLORIDE 50 MG: 20 INJECTION INTRAVENOUS at 14:05

## 2023-10-09 RX ADMIN — IPRATROPIUM BROMIDE AND ALBUTEROL SULFATE 3 ML: 2.5; .5 SOLUTION RESPIRATORY (INHALATION) at 13:48

## 2023-10-09 RX ADMIN — PREDNISONE 40 MG: 20 TABLET ORAL at 08:49

## 2023-10-09 RX ADMIN — Medication 80 MCG: at 14:16

## 2023-10-09 RX ADMIN — Medication 5 MG: at 21:13

## 2023-10-09 RX ADMIN — SERTRALINE HYDROCHLORIDE 25 MG: 50 TABLET ORAL at 08:49

## 2023-10-09 RX ADMIN — QUETIAPINE FUMARATE 12.5 MG: 25 TABLET ORAL at 21:12

## 2023-10-09 RX ADMIN — LIDOCAINE HYDROCHLORIDE 50 MG: 20 INJECTION, SOLUTION INFILTRATION; PERINEURAL at 14:04

## 2023-10-09 SDOH — HEALTH STABILITY: MENTAL HEALTH: CURRENT SMOKER: 0

## 2023-10-09 ASSESSMENT — PAIN SCALES - GENERAL
PAINLEVEL_OUTOF10: 0 - NO PAIN
PAINLEVEL_OUTOF10: 0 - NO PAIN
PAIN_LEVEL: 0
PAINLEVEL_OUTOF10: 0 - NO PAIN

## 2023-10-09 ASSESSMENT — COGNITIVE AND FUNCTIONAL STATUS - GENERAL
STANDING UP FROM CHAIR USING ARMS: A LOT
TOILETING: A LOT
DRESSING REGULAR LOWER BODY CLOTHING: A LOT
MOBILITY SCORE: 14
DRESSING REGULAR UPPER BODY CLOTHING: A LITTLE
MOVING TO AND FROM BED TO CHAIR: A LOT
EATING MEALS: A LITTLE
MOVING TO AND FROM BED TO CHAIR: A LITTLE
TOILETING: A LOT
EATING MEALS: A LITTLE
MOBILITY SCORE: 12
WALKING IN HOSPITAL ROOM: TOTAL
DRESSING REGULAR LOWER BODY CLOTHING: A LOT
PERSONAL GROOMING: A LITTLE
DAILY ACTIVITIY SCORE: 13
STANDING UP FROM CHAIR USING ARMS: A LOT
PERSONAL GROOMING: A LOT
TURNING FROM BACK TO SIDE WHILE IN FLAT BAD: A LITTLE
HELP NEEDED FOR BATHING: A LOT
DRESSING REGULAR UPPER BODY CLOTHING: A LOT
WALKING IN HOSPITAL ROOM: A LOT
MOVING FROM LYING ON BACK TO SITTING ON SIDE OF FLAT BED WITH BEDRAILS: A LITTLE
DAILY ACTIVITIY SCORE: 15
CLIMB 3 TO 5 STEPS WITH RAILING: TOTAL
CLIMB 3 TO 5 STEPS WITH RAILING: TOTAL
HELP NEEDED FOR BATHING: A LOT
TURNING FROM BACK TO SIDE WHILE IN FLAT BAD: A LITTLE
MOVING FROM LYING ON BACK TO SITTING ON SIDE OF FLAT BED WITH BEDRAILS: A LITTLE

## 2023-10-09 ASSESSMENT — PAIN - FUNCTIONAL ASSESSMENT
PAIN_FUNCTIONAL_ASSESSMENT: 0-10

## 2023-10-09 NOTE — PROGRESS NOTES
Physical Therapy                 Therapy Communication Note    Patient Name: Christie Arias  MRN: [id number]  Today's Date: 10/9/2023     Discipline: Physical Therapy    Missed Visit Reason: Missed Visit Reason: Patient in a medical procedure (Per nurse patient off of floor for bronch procedure. Attmpet @ [id number])    Missed Time: Attempt    Comment:

## 2023-10-09 NOTE — ANESTHESIA POSTPROCEDURE EVALUATION
Patient: Christie Arias    Procedure Summary       Date: 10/09/23 Room / Location: [facility] OR    Anesthesia Start: 1352 Anesthesia Stop: 1456    Procedure: BRONCHOSCOPY Diagnosis: Collapse of left lung    Scheduled Providers: Ovidio Beebe DO Responsible Provider: Ivan Mccarty MD    Anesthesia Type: general ASA Status: 4            Anesthesia Type: general    Vitals Value Taken Time   BP 82/62 10/09/23 1548   Temp 36.6 °C (97.9 °F) 10/09/23 1448   Pulse 79 10/09/23 1548   Resp 26 10/09/23 1548   SpO2 93 % 10/09/23 1548       Anesthesia Post Evaluation    Patient location during evaluation: PACU  Patient participation: complete - patient participated  Level of consciousness: awake  Pain score: 0  Multimodal analgesia pain management approach  Airway patency: patent  Two or more strategies used to mitigate risk of obstructive sleep apnea  Cardiovascular status: acceptable  Respiratory status: acceptable  Hydration status: acceptable        No notable events documented.

## 2023-10-09 NOTE — ANESTHESIA PREPROCEDURE EVALUATION
Patient: Christie Arias    Procedure Information       Date/Time: 10/09/23 1400    Scheduled providers: Ovidio Beebe DO    Procedure: BRONCHOSCOPY    Location: [facility] OR            Relevant Problems   Anesthesia (within normal limits)      Cardiovascular   (+) HTN (hypertension)   (+) Hyperlipidemia      Endocrine (within normal limits)      GI (within normal limits)      /Renal (within normal limits)      Neuro/Psych   (+) Anxiety   (+) Dementia with psychosis (CMS/HCC)      Pulmonary   (+) Chronic obstructive pulmonary disease (CMS/HCC)   (+) Pneumonia      Hematology   (+) Iron deficiency anemia       Clinical information reviewed:    Allergies  Meds  Problems              NPO Detail:  NPO/Void Status  Date of Last Liquid: 10/08/23  Time of Last Liquid: 2350  Date of Last Solid: 10/08/23  Time of Last Solid: 2000         Physical Exam    Airway  Mallampati: IV  TM distance: >3 FB     Cardiovascular   Rhythm: regular     Dental   Comments: Extremely poor dentition. Teeth are broken, decaying, discolored.    Pulmonary   (+) rhonchi     Abdominal            Anesthesia Plan    ASA 4     general     The patient is not a current smoker.    intravenous induction   Anesthetic plan and risks discussed with patient.  Use of blood products discussed with patient who.    Plan discussed with attending.

## 2023-10-09 NOTE — CONSULTS
Nutrition Follow-up Note    Reason for Assessment  Reason for Assessment:  (Follow Up)    Energy Intake:  (MARY; unsuccesful attempt to contact [facility]([phone]); pt noted d/ox2 did not interview this date)  Food and Nutrient History: Pt NPO at time of visit.  Pt states she is hungry, reports her appetite has been pretty good    Previous Diet / Nutrition Education / Counseling: Per [place]: Regular diet thin liquids, Boost BID 120mL                   Physical Ability to Self-Feed: Yes (independant)    0-10  Difficulty chewing: none  Difficulty swallowing: none    Objective   Dietary Orders (From admission, onward)       Start     Ordered    10/09/23 0001  NPO Diet; Effective midnight  Diet effective midnight         10/08/23 1507                   Current Facility-Administered Medications   Medication Dose Route Frequency Provider Last Rate Last Admin    acetaminophen (Tylenol) tablet 650 mg  650 mg oral q4h PRN Delilah Sandoval MD   650 mg at 10/06/23 2130    [Held by provider] amLODIPine (Norvasc) tablet 10 mg  10 mg oral Daily Delilah Sandoval MD        atorvastatin (Lipitor) tablet 80 mg  80 mg oral Nightly Delilah Sandoval MD   80 mg at 10/08/23 2108    docusate sodium (Colace) capsule 100 mg  100 mg oral BID PRN Yaz Biswas MD   100 mg at 10/08/23 2110    enoxaparin (Lovenox) syringe 30 mg  30 mg subcutaneous q24h Delilah Sandoval MD   30 mg at 10/09/23 1231    [Held by provider] ferrous sulfate 325 (65 Fe) MG tablet 65 mg of iron  65 mg of iron oral Daily with breakfast Delilah Sandoval MD        fluticasone furoate-vilanteroL (Breo Ellipta) 100-25 mcg/dose inhaler 1 puff  1 puff inhalation Daily Delilah Sandoval MD   1 puff at 10/09/23 0850    guaiFENesin (Robitussin) 100 mg/5 mL syrup 100 mg  100 mg oral 4x daily PRN Delilah Sandoval MD        ipratropium-albuteroL (Duo-Neb) 0.5-2.5 mg/3 mL nebulizer solution 3 mL  3 mL nebulization q2h PRN Delilah Sandoval MD   3 mL at 10/06/23 0320    ipratropium-albuteroL (Duo-Neb) 0.5-2.5  mg/3 mL nebulizer solution 3 mL  3 mL nebulization TID Gold Avila, DO   3 mL at 10/09/23 0745    labetaloL (Normodyne,Trandate) injection 10 mg  10 mg intravenous Once Quang Corley MD        melatonin tablet 5 mg  5 mg oral Nightly Yaz Biswas MD   5 mg at 10/08/23 2109    mirtazapine (Remeron) tablet 15 mg  15 mg oral q24h Delilah Sandoval MD   15 mg at 10/08/23 2109    ondansetron ODT (Zofran-ODT) disintegrating tablet 4 mg  4 mg oral q8h PRN Delilah Sandoval MD   4 mg at 10/06/23 1912    oxygen (O2) therapy   inhalation Continuous - 02/gases Gold Avila, DO   40 L/min at 10/09/23 0800    oxygen (O2) therapy   inhalation Continuous PRN - O2/gases Gold Avila, DO   8 L/min at 10/09/23 1146    pantoprazole (ProtoNix) EC tablet 40 mg  40 mg oral Daily before breakfast Delilah Sandoval MD   40 mg at 10/09/23 0631    piperacillin-tazobactam-dextrose (Zosyn) IV 4.5 g  4.5 g intravenous q6h Gold Avila, DO   Stopped at 10/09/23 1212    polyethylene glycol (Glycolax, Miralax) packet 17 g  17 g oral Daily PRN Yaz Biswas MD        potassium chloride (Klor-Con) packet 20 mEq  20 mEq oral BID Quang Corley MD   20 mEq at 10/09/23 0849    predniSONE (Deltasone) tablet 40 mg  40 mg oral Daily Delilah Sandoval MD   40 mg at 10/09/23 0849    QUEtiapine (SEROquel) tablet 12.5 mg  12.5 mg oral Nightly Delilah Sandoval MD   12.5 mg at 10/08/23 2109    sertraline (Zoloft) tablet 25 mg  25 mg oral Daily Delilah Sandoval MD   25 mg at 10/09/23 0849    tiotropium (Spiriva Respimat) 2.5 mcg/actuation inhaler 2 puff  2 puff inhalation Daily Delilah Sandoval MD   2 puff at 10/09/23 0631     Past Medical History:   Diagnosis Date    Personal history of other diseases of the digestive system     History of diverticulitis of colon     Principal Problem:    Chronic obstructive pulmonary disease (CMS/HCC)  Active Problems:    Respiratory failure (CMS/HCC)    Severe protein-calorie malnutrition (CMS/HCC)    Iron deficiency anemia    Anxiety    Failure to thrive in  "adult    Pneumonia     Allergies: Codeine      Anthropometrics:  Height: 175.3 cm (5' 9\")  Weight: 49.5 kg (109 lb 2 oz)  BMI (Calculated): 16.11    Daily Weight  10/09/23 : 49.5 kg (109 lb 2 oz)  08/31/22 : (!) 42 kg (92 lb 9.6 oz)  07/28/22 : 38.6 kg (85 lb 2 oz)  07/26/22 : 39.1 kg (86 lb 3 oz)  07/19/22 : 39.1 kg (86 lb 3 oz)  06/10/22 : (!) 38.1 kg (84 lb)      Ideal Body Weight: 66kg     Body mass index is 16.12 kg/m².     Estimated Energy Needs: 1485 (30kcal/kg actual)  Estimated Protein Needs: 59-74 (1.2-1.5g/kg actual)  Estimated Fluid Needs: 1485 (1mL/kcal)      Results from last 7 days   Lab Units 10/09/23  0516 10/08/23  0826 10/07/23  0731 10/06/23  0614   GLUCOSE mg/dL 87 81 84 89   SODIUM mmol/L 138 135* 138 139   POTASSIUM mmol/L 3.6 3.1* 3.3* 3.3*   CHLORIDE mmol/L 101 98 103 104   CO2 mmol/L 24 25 25 24   BUN mg/dL 24* 18 18 23   CREATININE mg/dL 1.01 1.00 0.88 1.06*   EGFR mL/min/1.73m*2 60* 61 71 57*   CALCIUM mg/dL 8.3* 8.3* 8.1* 8.3*   PHOSPHORUS mg/dL 4.2 3.5 3.3 3.4   MAGNESIUM mg/dL 1.80  --  1.65 1.86     Lab Results   Component Value Date    HGBA1C 5.2 08/02/2022     Results from last 7 days   Lab Units 10/05/23  0209   POCT GLUCOSE mg/dL 134*                           Nutrition Focused Physical Findings:   Orbital Fat Pads:  (visually noted thin face, no physical assesment complete, not appropriate)    Temporalis:  (visually noted severe muscle wasting, pt very thin)    Edema  Edema: none    Skin: Negative (intact)  Pain Score: 0 - No pain     GI per flowsheet:  Gastrointestinal  Gastrointestinal (WDL): Within Defined Limits  Stool Appearance: Hard  Stool Color: Brown  Last bowel movement documented: 10/06/23    Diagnosis   Patient has Malnutrition Diagnosis: Yes  Diagnosis Status: Ongoing  Malnutrition Diagnosis: Severe malnutrition related to chronic disease or condition  As Evidenced by: severe muscle and fat wasting, BMI = 16.11  Additional Assessment Information: obtain intake " history as available    Interventions/Recommendations      Interventions: Meals and snacks  Meals and Snacks:  ([place] diet, encourage snacks and PO as able)  Individualized Nutrition Prescription Provided for : Ensure HP BID (160 kcal, 16 g protein each)  Magic cup daily = 290 kcal, 9 g protein  Coordination of Care: Ashwini RN      Monitoring and Evaluation   PO intake  Supplement acceptance             Time Spent (min): 60 minutes  Last Date of Nutrition Visit: 10/09/23  Nutrition Follow-Up Needed?: Dietitian to reassess per policy     Attending with

## 2023-10-09 NOTE — PROGRESS NOTES
Christie Arias is a 68 y.o. female on day 7 of admission presenting with Chronic obstructive pulmonary disease (CMS/HCC).    Subjective   Interval History: no fever, exertional dyspnea, no chest pain        Review of Systems    Objective   Range of Vitals (last 24 hours)  Heart Rate:  [82-97]   Temp:  [36.7 °C (98.1 °F)-37.2 °C (99 °F)]   Resp:  [19-29]   BP: (113-144)/()   Weight:  [49.5 kg (109 lb 2 oz)]   SpO2:  [92 %-98 %]   Daily Weight  10/09/23 : 49.5 kg (109 lb 2 oz)    Body mass index is 16.12 kg/m².    Physical Exam  Constitutional:       Appearance: Normal appearance.      Comments: cachectic   HENT:      Head: Normocephalic and atraumatic.      Mouth/Throat:      Mouth: Mucous membranes are moist.      Pharynx: Oropharynx is clear.   Eyes:      Pupils: Pupils are equal, round, and reactive to light.   Cardiovascular:      Rate and Rhythm: Normal rate and regular rhythm.      Heart sounds: Normal heart sounds.   Pulmonary:      Effort: Pulmonary effort is normal.      Breath sounds: Normal breath sounds.   Abdominal:      General: Abdomen is flat. Bowel sounds are normal.      Palpations: Abdomen is soft.   Musculoskeletal:      Cervical back: Normal range of motion.   Neurological:      Mental Status: She is alert.         Antibiotics  sodium chloride 0.9 % bolus 500 mL  methylPREDNISolone sod succinate (PF) (SOLU-Medrol) injection 125 mg  piperacillin-tazobactam-dextrose (Zosyn) IV 4.5 g  vancomycin in dextrose 5 % (Vancocin) IVPB 1 g  oxygen (O2) therapy  sodium chloride 0.9 % bolus 1,572 mL  iohexol (OMNIPaque) 350 mg iodine/mL injection 100 mL  oxygen (O2) therapy  ondansetron ODT (Zofran-ODT) disintegrating tablet 4 mg  ondansetron (Zofran) injection 4 mg  acetaminophen (Tylenol) tablet 650 mg  acetaminophen (Tylenol) oral liquid 650 mg  acetaminophen (Tylenol) suppository 650 mg  pantoprazole (ProtoNix) injection 80 mg  pantoprazole (ProtoNix) injection 40 mg  ipratropium-albuteroL  (Duo-Neb) 0.5-2.5 mg/3 mL nebulizer solution 3 mL  piperacillin-tazobactam-dextrose (Zosyn) IV 3.375 g  predniSONE (Deltasone) tablet 40 mg  vancomycin in dextrose 5 % (Vancocin) IVPB 500 mg  ipratropium-albuteroL (Duo-Neb) 0.5-2.5 mg/3 mL nebulizer solution 3 mL  ipratropium-albuteroL (Duo-Neb) 0.5-2.5 mg/3 mL nebulizer solution 3 mL  ipratropium-albuteroL (Duo-Neb) 0.5-2.5 mg/3 mL nebulizer solution 3 mL  tiotropium (Spiriva Respimat) 2.5 mcg/actuation inhaler 2 puff  fluticasone furoate-vilanteroL (Breo Ellipta) 100-25 mcg/dose inhaler 1 puff  amLODIPine (Norvasc) tablet  aspirin EC tablet      traMADol (Ultram) tablet  pantoprazole (ProtoNix) EC tablet  mirtazapine (Remeron) tablet      nitroglycerin (Nitrostat) SL tablet  potassium chloride SA (Klor-Con M20) ER tablet  sertraline (Zoloft) tablet  acetaminophen (Tylenol) tablet  ondansetron (Zofran) tablet  acetaminophen (Tylenol) tablet 325 mg  amLODIPine (Norvasc) tablet 10 mg  atorvastatin (Lipitor) tablet 80 mg  guaiFENesin (Robitussin) 100 mg/5 mL syrup 100 mg  mirtazapine (Remeron) tablet 15 mg  sertraline (Zoloft) tablet 25 mg  potassium chloride IV 20 mEq  oxygen (O2) therapy  ipratropium-albuteroL (Duo-Neb) 0.5-2.5 mg/3 mL nebulizer solution 3 mL  ferrous sulfate 325 (65 Fe) MG tablet 65 mg of iron  oxygen (O2) therapy  iron sucrose (Venofer) 200 mg in sodium chloride 0.9% 100 mL IVPB  melatonin tablet 5 mg  ipratropium-albuteroL (Duo-Neb) 0.5-2.5 mg/3 mL nebulizer solution 3 mL  enoxaparin (Lovenox) syringe 50 mg  piperacillin-tazobactam-dextrose (Zosyn) IV 4.5 g  enoxaparin (Lovenox) syringe 30 mg  piperacillin-tazobactam-dextrose (Zosyn) IV 3.375 g  vancomycin in dextrose 5 % (Vancocin) IVPB 1,000 mg  enoxaparin (Lovenox) syringe 30 mg  ipratropium-albuteroL (Duo-Neb) 0.5-2.5 mg/3 mL nebulizer solution 3 mL  OLANZapine (ZyPREXA) injection 5 mg  potassium chloride IV 20 mEq  potassium chloride (Klor-Con) packet 20 mEq  metoprolol tartrate (Lopressor)  5 mg/5 mL injection  - Omnicell Override Pull  metoprolol tartrate (Lopressor) injection 5 mg  oxygen (O2) therapy  polyethylene glycol (Glycolax, Miralax) packet 17 g  docusate sodium (Colace) capsule 100 mg  potassium chloride IV 20 mEq  magnesium oxide (Mag-Ox) tablet 400 mg  OLANZapine (ZyPREXA) injection 5 mg  QUEtiapine (SEROquel) tablet 12.5 mg  pantoprazole (ProtoNix) EC tablet 40 mg  oxygen (O2) therapy  ipratropium-albuteroL (Duo-Neb) 0.5-2.5 mg/3 mL nebulizer solution 3 mL  oxygen (O2) therapy  oxygen (O2) therapy  potassium chloride IV 20 mEq  predniSONE (Deltasone) tablet 40 mg  OLANZapine (ZyPREXA) injection 5 mg  labetaloL (Normodyne,Trandate) injection 10 mg  oxygen (O2) therapy  magnesium sulfate IV 2 g  potassium chloride (Klor-Con) packet 20 mEq  piperacillin-tazobactam-dextrose (Zosyn) IV 4.5 g  potassium chloride IVPB 20 mEq  oxygen (O2) therapy  oxygen (O2) therapy      Relevant Results  Labs  Results from last 72 hours   Lab Units 10/09/23  0516 10/08/23  0826 10/07/23  0731   WBC AUTO x10*3/uL 13.1* 13.6* 11.3   HEMOGLOBIN g/dL 8.7* 10.0* 9.3*   HEMATOCRIT % 28.6* 33.2* 31.1*   PLATELETS AUTO x10*3/uL 349 406 400   NEUTROS PCT AUTO % 76.2  --   --    LYMPHS PCT AUTO % 13.0  --   --    MONOS PCT AUTO % 10.0  --   --    EOS PCT AUTO % 0.2  --   --      Results from last 72 hours   Lab Units 10/09/23  0516 10/08/23  0826 10/07/23  0731   SODIUM mmol/L 138 135* 138   POTASSIUM mmol/L 3.6 3.1* 3.3*   CHLORIDE mmol/L 101 98 103   CO2 mmol/L 24 25 25   BUN mg/dL 24* 18 18   CREATININE mg/dL 1.01 1.00 0.88   GLUCOSE mg/dL 87 81 84   CALCIUM mg/dL 8.3* 8.3* 8.1*   ANION GAP mmol/L 17 15 13   EGFR mL/min/1.73m*2 60* 61 71   PHOSPHORUS mg/dL 4.2 3.5 3.3     Results from last 72 hours   Lab Units 10/09/23  0516 10/08/23  0826 10/07/23  0731   ALBUMIN g/dL 3.1* 3.5 3.4     Estimated Creatinine Clearance: 40.5 mL/min (by C-G formula based on SCr of 1.01 mg/dL).  C-Reactive Protein   Date Value Ref Range  Status   10/03/2023 0.84 <1.00 mg/dL Final     Microbiology  Susceptibility data from last 14 days.  Collected Specimen Info Organism   10/02/23 Blood culture from Peripheral Venipuncture Coagulase negative staphylococcus     Imaging  reviewed    Assessment/Plan   Bacteremia, CNS, the repeat BC is negative  Respiratory failure / atelectasis, intermittent hypoxia is likely recurrent atelectasis, O2 remains a challenge  Failure to thrive     Recommendations :  Continue Alee     I spent  minutes in the professional and overall care of this patient.      Leslye Cortez MD

## 2023-10-09 NOTE — PROGRESS NOTES
Department of Medicine  Division of Pulmonary, Critical Care, and Sleep Medicine    Christie Arias is a 68 y.o. female on day 7 of admission presenting with Chronic obstructive pulmonary disease (CMS/HCC).    Subjective   Saw patient this morning. She had coughed up some yellow mucous. Stated she was nervous for her bronchoscopy today. At that time she was satting well on airvo, 30L/min at 70% FIO2.    Complained of frequent bowel movements.       Objective     Vital Signs      10/9/2023    10:54 AM 10/9/2023    12:44 PM 10/9/2023     2:48 PM 10/9/2023     3:03 PM 10/9/2023     3:18 PM 10/9/2023     3:48 PM 10/9/2023     4:27 PM   Vitals   Systolic 128 92 79 80 80 82 107   Diastolic 97 68 61 66 66 62 77   Heart Rate 97 88 81 72 83 79 87   Temp 37 °C (98.6 °F) 36.6 °C (97.9 °F) 36.6 °C (97.9 °F)    36.6 °C (97.9 °F)   Resp 23 20 22 25 27 26         Physical Exam  Constitutional:       Comments: Ill looking, awake and alert.    HENT:      Head: Normocephalic and atraumatic.      Nose: Nose normal.      Mouth/Throat:      Pharynx: Oropharynx is clear.   Eyes:      Pupils: Pupils are equal, round, and reactive to light.   Cardiovascular:      Rate and Rhythm: Normal rate and regular rhythm.   Pulmonary:      Effort: Pulmonary effort is normal.      Breath sounds: No wheezing or rhonchi.      Comments: Coarse breath sounds bilaterally, no wheezing  Abdominal:      General: There is no distension.      Palpations: Abdomen is soft.      Tenderness: There is no abdominal tenderness.   Musculoskeletal:      Cervical back: Neck supple.      Right lower leg: No edema.      Left lower leg: No edema.   Neurological:      Mental Status: She is oriented to person, place, and time.   Psychiatric:         Mood and Affect: Mood normal.         Behavior: Behavior normal.          Labs:  Lab Results   Component Value Date    WBC 13.1 (H) 10/09/2023    HGB 8.7 (L) 10/09/2023    HCT 28.6 (L) 10/09/2023    MCV 79 (L) 10/09/2023      10/09/2023      Lab Results   Component Value Date    GLUCOSE 87 10/09/2023    CALCIUM 8.3 (L) 10/09/2023     10/09/2023    K 3.6 10/09/2023    CO2 24 10/09/2023     10/09/2023    BUN 24 (H) 10/09/2023    CREATININE 1.01 10/09/2023      Lab Results   Component Value Date    ALT 18 10/02/2023    AST 27 10/02/2023    ALKPHOS 121 10/02/2023    BILITOT 0.5 10/02/2023        Oxygen Therapy  SpO2: 98 %  Oxygen Therapy: Supplemental oxygen  O2 Delivery Method: Nasal cannula  FiO2 (%):  [52 %-70 %] 52 %  PEEP/CPAP (cm H2O):  [5 cm H20] 5 cm H20    Intake/Output last 3 Shifts:  I/O last 3 completed shifts:  In: 1571 (31.7 mL/kg) [P.O.:720; I.V.:151 (3.1 mL/kg); IV Piggyback:700]  Out: 1400 (28.3 mL/kg) [Urine:1400 (0.8 mL/kg/hr)]  Weight: 49.5 kg       Medications   Scheduled medications  [Held by provider] amLODIPine, 10 mg, oral, Daily  atorvastatin, 80 mg, oral, Nightly  enoxaparin, 30 mg, subcutaneous, q24h  [Held by provider] ferrous sulfate, 65 mg of iron, oral, Daily with breakfast  fluticasone furoate-vilanteroL, 1 puff, inhalation, Daily  ipratropium-albuteroL, 3 mL, nebulization, TID  labetaloL, 10 mg, intravenous, Once  melatonin, 5 mg, oral, Nightly  mirtazapine, 15 mg, oral, q24h  oxygen, , inhalation, Continuous - 02/gases  pantoprazole, 40 mg, oral, Daily before breakfast  piperacillin-tazobactam, 4.5 g, intravenous, q6h  potassium chloride, 20 mEq, oral, BID  predniSONE, 40 mg, oral, Daily  QUEtiapine, 12.5 mg, oral, Nightly  sertraline, 25 mg, oral, Daily  tiotropium, 2 puff, inhalation, Daily      Continuous medications     PRN medications  PRN medications: acetaminophen **OR** [DISCONTINUED] acetaminophen **OR** [DISCONTINUED] acetaminophen, docusate sodium, guaiFENesin, ipratropium-albuteroL, ondansetron ODT **OR** [DISCONTINUED] ondansetron, oxygen, polyethylene glycol     Allergies  Codeine    Chest Radiograph   XR chest 1 view 10/09/2023    Narrative  Interpreted By:  Rm  Ivory,  STUDY:  XR CHEST 1 VIEW;  10/9/2023 4:04 pm    Impression  Bilateral parenchymal infiltration. Bilateral pleural effusions.  Slight improvement of aeration on the left.    MACRO:  none    Signed by: Ivory Abdalla 10/9/2023 4:19 PM  Dictation workstation:   [id number]       Assessment and Plan / Recommendations   Assessment/Plan   68-year-old female with history of hypertension, CVA, COPD admitted with acute hypoxic respiratory failure     1.  Acute hypoxic respiratory failure due to COPD exacerbation, suspected pneumonia, mucous plugging and atelectasis  2.  COPD exacerbation  3.  Suspected pneumonia/mucous plugging and atelectasis-patient with left lower lobe consolidation and atelectasis persistent/worsening since at least April of this year    Hypoxia slightly better today, on 30 L / 70% Airvo pre bronchoscopy w/ wash     -Continue prednisone 40 mg daily  -Continue nebs, Breo  -continue bronchial hygiene with Incentive spirometry, Acapella, vest, wean O2 as tolerated.  -received bronch today; removed significant amount of mucus plugging from left bronchi.  -repeat cxr shows slightly improved aeration on the left  -Continue Alee Chapman,   Internal Medicine PGY1

## 2023-10-09 NOTE — CONSULTS
"Consults    Reason For Consult      History Of Present Illness  Christie Arias is a 68 y.o. female presenting with .     Past Medical History  Past Medical History:   Diagnosis Date    Personal history of other diseases of the digestive system     History of diverticulitis of colon       Surgical History  Past Surgical History:   Procedure Laterality Date    CT ABDOMEN PELVIS ANGIOGRAM W AND/OR WO IV CONTRAST  4/12/2023    CT ABDOMEN PELVIS ANGIOGRAM W AND/OR WO IV CONTRAST [facility] CT    OTHER SURGICAL HISTORY  07/22/2022    Colectomy    OTHER SURGICAL HISTORY  07/22/2022    Hysterectomy    OTHER SURGICAL HISTORY  07/22/2022    Femorofemoral bypass        Social History  Social History     Tobacco Use    Smoking status: Never    Smokeless tobacco: Never       Family History  No family history on file.    Occupational & Environmental History   Prior irradiaAllergies  Codeine    Review of Systems     Physical Exam     Vital Signs  Blood pressure 92/68, pulse 88, temperature 36.6 °C (97.9 °F), resp. rate 20, height 1.753 m (5' 9\"), weight 49.5 kg (109 lb 2 oz), SpO2 92 %.  Oxygen Therapy  SpO2: 92 %  Oxygen Therapy: Supplemental oxygen (*8 liters, from floor,NC.)  O2 Delivery Method: Nasal cannula  FiO2 (%): 52 %    Relevant Results  XR chest 1 view 10/08/2023    Narrative  Interpreted By:  Clay Marshall,  STUDY:  XR CHEST 1 VIEW  10/8/2023 12:22 pm    INDICATION:  Signs/Symptoms:Left lung collapse    COMPARISON:  10/06/2023    ACCESSION NUMBER(S):  [id number]    ORDERING CLINICIAN:  JARETH STARKS    TECHNIQUE:  A single AP portable radiograph of the chest was obtained.    FINDINGS:  Multiple cardiac monitoring leads are seen over the chest.  There is  near complete opacification of the left hemithorax, which can be seen  with large pleural effusion, atelectasis and/or pneumonia. Right  basilar airspace consolidation is seen and may represent small  pleural effusion, atelectasis and/or pneumonia. Diffuse " interstitial  infiltrates are seen in the right lung, and may represent edema  and/or pneumonia. No pneumothorax is identified. The cardiac  silhouette is within normal limits for size.    Impression  Bilateral airspace consolidations and diffuse interstitial  infiltrates bilaterally, as above. Clinical correlation and continued  follow-up until clearing is recommended.    MACRO:  None.    Signed by: Clay Marshall 10/8/2023 12:38 PM  Dictation workstation:   [id number]         Assessment/Plan         I spent  minutes in the professional and overall care of this patient.      Dax Pat MD

## 2023-10-09 NOTE — CARE PLAN
The patient's goals for the shift include keep O2 sat 92% and above    The clinical goals for the shift include wean 02 by end of shift.

## 2023-10-09 NOTE — PROGRESS NOTES
Christie Arias is a 68 y.o. female on day 7 of admission presenting with Chronic obstructive pulmonary disease (CMS/HCC).      Subjective   No acute events overnight. Patient was able to be weaned down to 30L, 70% FiO2 and resting comfortably at 93-94%. Repeat CXR taken yesterday continued to show almost complete opacification of the left lung, so plan is to receive bronchoscopy.       Objective     Last Recorded Vitals  BP 92/68   Pulse 88   Temp 36.6 °C (97.9 °F)   Resp 20   Wt 49.5 kg (109 lb 2 oz)   SpO2 92%   Intake/Output last 3 Shifts:    Intake/Output Summary (Last 24 hours) at 10/9/2023 1440  Last data filed at 10/9/2023 1212  Gross per 24 hour   Intake 1080 ml   Output 800 ml   Net 280 ml       Admission Weight  Weight: 52.4 kg (115 lb 8.3 oz) (10/01/23 2306)    Daily Weight  10/09/23 : 49.5 kg (109 lb 2 oz)    Image Results  XR chest 1 view  Narrative: Interpreted By:  Clay Marshall,   STUDY:  XR CHEST 1 VIEW  10/8/2023 12:22 pm      INDICATION:  Signs/Symptoms:Left lung collapse      COMPARISON:  10/06/2023      ACCESSION NUMBER(S):  [id number]      ORDERING CLINICIAN:  JARETH STARKS      TECHNIQUE:  A single AP portable radiograph of the chest was obtained.      FINDINGS:  Multiple cardiac monitoring leads are seen over the chest.  There is  near complete opacification of the left hemithorax, which can be seen  with large pleural effusion, atelectasis and/or pneumonia. Right  basilar airspace consolidation is seen and may represent small  pleural effusion, atelectasis and/or pneumonia. Diffuse interstitial  infiltrates are seen in the right lung, and may represent edema  and/or pneumonia. No pneumothorax is identified. The cardiac  silhouette is within normal limits for size.      Impression: Bilateral airspace consolidations and diffuse interstitial  infiltrates bilaterally, as above. Clinical correlation and continued  follow-up until clearing is recommended.      MACRO:  None.      Signed  by: Clay Jasminnuris 10/8/2023 12:38 PM  Dictation workstation:   [id number]      Physical Exam  Vitals reviewed.   Constitutional:       General: She is not in acute distress.     Appearance: She is underweight. She is not ill-appearing.   Cardiovascular:      Rate and Rhythm: Regular rhythm. Tachycardia present.      Pulses: Normal pulses.      Heart sounds: Normal heart sounds. No murmur heard.  Pulmonary:      Effort: Pulmonary effort is normal. No respiratory distress.      Breath sounds: Normal breath sounds. No wheezing.   Chest:      Chest wall: No tenderness.   Abdominal:      General: Bowel sounds are normal. There is no distension.      Palpations: Abdomen is soft.      Tenderness: There is no abdominal tenderness.   Musculoskeletal:      Right lower leg: No edema.      Left lower leg: No edema.   Neurological:      Mental Status: She is alert and oriented to person, place, and time. Mental status is at baseline.         Relevant Results  Scheduled medications  [Held by provider] amLODIPine, 10 mg, oral, Daily  atorvastatin, 80 mg, oral, Nightly  enoxaparin, 30 mg, subcutaneous, q24h  [Held by provider] ferrous sulfate, 65 mg of iron, oral, Daily with breakfast  fluticasone furoate-vilanteroL, 1 puff, inhalation, Daily  ipratropium-albuteroL, 3 mL, nebulization, TID  labetaloL, 10 mg, intravenous, Once  melatonin, 5 mg, oral, Nightly  mirtazapine, 15 mg, oral, q24h  oxygen, , inhalation, Continuous - 02/gases  pantoprazole, 40 mg, oral, Daily before breakfast  piperacillin-tazobactam, 4.5 g, intravenous, q6h  potassium chloride, 20 mEq, oral, BID  predniSONE, 40 mg, oral, Daily  QUEtiapine, 12.5 mg, oral, Nightly  sertraline, 25 mg, oral, Daily  tiotropium, 2 puff, inhalation, Daily      Continuous medications     PRN medications  PRN medications: acetaminophen **OR** [DISCONTINUED] acetaminophen **OR** [DISCONTINUED] acetaminophen, docusate sodium, guaiFENesin, ipratropium-albuteroL, ondansetron ODT **OR**  [DISCONTINUED] ondansetron, oxygen, polyethylene glycol    Results for orders placed or performed during the hospital encounter of 10/01/23 (from the past 24 hour(s))   CBC and Auto Differential   Result Value Ref Range    WBC 13.1 (H) 4.4 - 11.3 x10*3/uL    nRBC 0.0 0.0 - 0.0 /100 WBCs    RBC 3.63 (L) 4.00 - 5.20 x10*6/uL    Hemoglobin 8.7 (L) 12.0 - 16.0 g/dL    Hematocrit 28.6 (L) 36.0 - 46.0 %    MCV 79 (L) 80 - 100 fL    MCH 24.0 (L) 26.0 - 34.0 pg    MCHC 30.4 (L) 32.0 - 36.0 g/dL    RDW 22.8 (H) 11.5 - 14.5 %    Platelets 349 150 - 450 x10*3/uL    MPV 11.1 7.5 - 11.5 fL    Neutrophils % 76.2 40.0 - 80.0 %    Immature Granulocytes %, Automated 0.5 0.0 - 0.9 %    Lymphocytes % 13.0 13.0 - 44.0 %    Monocytes % 10.0 2.0 - 10.0 %    Eosinophils % 0.2 0.0 - 6.0 %    Basophils % 0.1 0.0 - 2.0 %    Neutrophils Absolute 9.98 (H) 1.20 - 7.70 x10*3/uL    Immature Granulocytes Absolute, Automated 0.07 0.00 - 0.70 x10*3/uL    Lymphocytes Absolute 1.70 1.20 - 4.80 x10*3/uL    Monocytes Absolute 1.31 (H) 0.10 - 1.00 x10*3/uL    Eosinophils Absolute 0.03 0.00 - 0.70 x10*3/uL    Basophils Absolute 0.01 0.00 - 0.10 x10*3/uL   Renal function panel   Result Value Ref Range    Glucose 87 74 - 99 mg/dL    Sodium 138 136 - 145 mmol/L    Potassium 3.6 3.5 - 5.3 mmol/L    Chloride 101 98 - 107 mmol/L    Bicarbonate 24 21 - 32 mmol/L    Anion Gap 17 10 - 20 mmol/L    Urea Nitrogen 24 (H) 6 - 23 mg/dL    Creatinine 1.01 0.50 - 1.05 mg/dL    eGFR 60 (L) >60 mL/min/1.73m*2    Calcium 8.3 (L) 8.6 - 10.3 mg/dL    Phosphorus 4.2 2.5 - 4.9 mg/dL    Albumin 3.1 (L) 3.4 - 5.0 g/dL   Magnesium   Result Value Ref Range    Magnesium 1.80 1.60 - 2.40 mg/dL   Morphology   Result Value Ref Range    RBC Morphology See Below     Polychromasia Mild     Hypochromia Mild     RBC Fragments Few     Target Cells Few     [place] Cells Few     Acanthocytes Few    Blood Gas Venous Full Panel Unsolicited   Result Value Ref Range    POCT pH, Venous 7.49 (H) 7.33 -  7.43 pH    POCT pCO2, Venous 34 (L) 41 - 51 mm Hg    POCT pO2, Venous 83 (H) 35 - 45 mm Hg    POCT SO2, Venous 99 (H) 45 - 75 %    POCT Oxy Hemoglobin, Venous 96.2 (H) 45.0 - 75.0 %    POCT Hematocrit Calculated, Venous 27.0 (L) 36.0 - 46.0 %    POCT Sodium, Venous 133 (L) 136 - 145 mmol/L    POCT Potassium, Venous 3.5 3.5 - 5.3 mmol/L    POCT Chloride, Venous 103 98 - 107 mmol/L    POCT Ionized Calicum, Venous 1.10 1.10 - 1.33 mmol/L    POCT Glucose, Venous 93 74 - 99 mg/dL    POCT Lactate, Venous 1.5 0.4 - 2.0 mmol/L    POCT Base Excess, Venous 2.6 -2.0 - 3.0 mmol/L    POCT HCO3 Calculated, Venous 25.9 22.0 - 26.0 mmol/L    POCT Hemoglobin, Venous 9.1 (L) 12.0 - 16.0 g/dL    POCT Anion Gap, Venous 8.0 (L) 10.0 - 25.0 mmol/L    Patient Temperature 37.0 degrees Celsius    FiO2 70 %    Flow 40.0 LPM   POCT PT/INR   Result Value Ref Range    POCT Prothrombin time      POCT INR 1.1        Assessment/Plan     Christie Arias68 y.o. 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 recently started on antibiotic Zosyn (10/7-).  Pulm following, plan for bronch today     Acute Issues:   #Acute hypoxic and hypercapneic respiratory failure, likely 2/2 COPD exacerbation  #LLL mucous plugging and atelectasis.  #R/o PNA  - Able to wean down Airvo to 30L/70% FiO2, wean as tolerated  - VBG this AM showed pH 7.49, pCO2 34, pO2 83, Bicarb 25.9, Lactate 1.5. Improvement in respiratory alkalosis from yesterday.  - Continue Prednisone 40mg (10/2 - current)  - continue Scheduled Duonebs TID and PRN, Spiriva, Breo  - continue Bronchial hygiene (acapella) and incentive spirometry; chest physiotherapy  - Continue Zosyn (10/7 - )  - Pulm to do bronch today    #Hypokalemia and hypomagnesemia (resolved)  - This AM, K+ 3.6 and Mg2+ 1.80 and stable, replete as necessary      #Acute on chronic anemia likely 2/2 JIE, stable  - Cont IV protonix 40mg daily  - Hold PO iron supplementation per GI recs as it may produce black stools  - Plan for OP heme onc follow-up after discharge.      #Failure to thrive  - consult nutrition, PT/OT following.      Chronic Issues:   #HTN: hold home amlodipine 10mg given soft Bps  #CVA: Continue atorvastatin 80 mg  #MDD: cont zoloft and remeron  #Anxiety: start seroquel 12.5mg at bedtime     F: None  E: Replete as needed  N: NPO currently due to bronch, will restart regular diet upon return  G: Protonix  VTE: Lovenox     Code status: DNR and No Intubation   Disposition: 68 y.o. female who was admitted for acute hypoxic respiratory failure 2/2 COPD exacerbation. Pulm following, will get bronch today.      Kita Lr MD

## 2023-10-09 NOTE — ANESTHESIA PROCEDURE NOTES
Airway  Date/Time: 10/9/2023 2:06 PM  Urgency: elective    Airway not difficult    Staffing  Performed: CRNA   Authorized by: Ivan Mccarty MD    Performed by: ROSALINDA Giang-AVTAR  Patient location during procedure: OR    Indications and Patient Condition  Indications for airway management: anesthesia  Spontaneous Ventilation: absent  Sedation level: deep  Preoxygenated: yes  Patient position: sniffing  Mask difficulty assessment: 1 - vent by mask    Final Airway Details  Final airway type: endotracheal airway      Successful airway: ETT  Cuffed: yes   Successful intubation technique: video laryngoscopy (brown 3)  Blade size: #3  ETT size (mm): 8.5  Cormack-Lehane Classification: grade I - full view of glottis  Placement verified by: chest auscultation, bronchoscopy and capnometry   Cuff volume (mL): 6  Measured from: lips  Number of attempts at approach: 1

## 2023-10-09 NOTE — CARE PLAN
The patient's goals for the shift include keep O2 sat 92% and above    The clinical goals for the shift include wean 02 by end of shift.   Pt airvo decreased to 40 liters 70% sats maintained in the upper 90s through shift. All questions answered regarding bronchoscopy planned for today  Problem: Skin  Goal: Participates in plan/prevention/treatment measures  Outcome: Progressing     Problem: Skin  Goal: Prevent/manage excess moisture  Outcome: Progressing     Problem: Skin  Goal: Prevent/minimize sheer/friction injuries  Outcome: Progressing     Problem: Skin  Goal: Promote/optimize nutrition  Outcome: Progressing

## 2023-10-09 NOTE — DISCHARGE INSTRUCTIONS
Dear Mrs. Arias,    You were admitted to the hospital for worsening SOB and was found to have respiratory failure 2/2 a COPD exacerbation with concern for pneumonia. You were found to have significant mucus plugging which was resolved by a bronchoscopy. You were also found to have a pleural effusion, which was drained off. Your respiratory status is appropriate enough for discharge at 1-2L of continuous oxygen. We will discharge you on the inhalers we've been giving you here because they have helped out your case. We have also discharged you on a Medrol Dospak, which is a steroid taper, make sure to follow the instructions as stated in the packet. Continue to take your home medications as prescribed. Please make sure to follow up with Dr. Walton in a week for a follow up visit.     It was a pleasure taking care of you!

## 2023-10-10 ENCOUNTER — APPOINTMENT (OUTPATIENT)
Dept: RADIOLOGY | Facility: HOSPITAL | Age: 68
DRG: 871 | End: 2023-10-10
Payer: MEDICAID

## 2023-10-10 LAB
ALBUMIN SERPL BCP-MCNC: 3.1 G/DL (ref 3.4–5)
ANION GAP SERPL CALC-SCNC: 15 MMOL/L (ref 10–20)
BUN SERPL-MCNC: 20 MG/DL (ref 6–23)
CALCIUM SERPL-MCNC: 7.7 MG/DL (ref 8.6–10.3)
CHLORIDE SERPL-SCNC: 102 MMOL/L (ref 98–107)
CLARITY FLD: CLEAR
CO2 SERPL-SCNC: 26 MMOL/L (ref 21–32)
COLOR FLD: NORMAL
CREAT SERPL-MCNC: 1.06 MG/DL (ref 0.5–1.05)
ERYTHROCYTE [DISTWIDTH] IN BLOOD BY AUTOMATED COUNT: 22.8 % (ref 11.5–14.5)
GFR SERPL CREATININE-BSD FRML MDRD: 57 ML/MIN/1.73M*2
GLUCOSE SERPL-MCNC: 84 MG/DL (ref 74–99)
HCT VFR BLD AUTO: 27.1 % (ref 36–46)
HGB BLD-MCNC: 8.2 G/DL (ref 12–16)
LDH SERPL L TO P-CCNC: 130 U/L (ref 84–246)
MAGNESIUM SERPL-MCNC: 1.74 MG/DL (ref 1.6–2.4)
MCH RBC QN AUTO: 23.8 PG (ref 26–34)
MCHC RBC AUTO-ENTMCNC: 30.3 G/DL (ref 32–36)
MCV RBC AUTO: 79 FL (ref 80–100)
NRBC BLD-RTO: 0 /100 WBCS (ref 0–0)
PHOSPHATE SERPL-MCNC: 4.7 MG/DL (ref 2.5–4.9)
PLATELET # BLD AUTO: 336 X10*3/UL (ref 150–450)
PMV BLD AUTO: 11 FL (ref 7.5–11.5)
POTASSIUM SERPL-SCNC: 3 MMOL/L (ref 3.5–5.3)
PROT SERPL-MCNC: 5.6 G/DL (ref 6.4–8.2)
RBC # BLD AUTO: 3.44 X10*6/UL (ref 4–5.2)
RBC # FLD AUTO: 86 /UL
SODIUM SERPL-SCNC: 140 MMOL/L (ref 136–145)
WBC # BLD AUTO: 9.7 X10*3/UL (ref 4.4–11.3)
WBC # FLD AUTO: 199 /UL

## 2023-10-10 PROCEDURE — 97112 NEUROMUSCULAR REEDUCATION: CPT | Mod: GP

## 2023-10-10 PROCEDURE — 80069 RENAL FUNCTION PANEL: CPT

## 2023-10-10 PROCEDURE — 2500000001 HC RX 250 WO HCPCS SELF ADMINISTERED DRUGS (ALT 637 FOR MEDICARE OP)

## 2023-10-10 PROCEDURE — 2060000001 HC INTERMEDIATE ICU ROOM DAILY

## 2023-10-10 PROCEDURE — 0B9J8ZX DRAINAGE OF LEFT LOWER LUNG LOBE, VIA NATURAL OR ARTIFICIAL OPENING ENDOSCOPIC, DIAGNOSTIC: ICD-10-PCS | Performed by: INTERNAL MEDICINE

## 2023-10-10 PROCEDURE — 2500000004 HC RX 250 GENERAL PHARMACY W/ HCPCS (ALT 636 FOR OP/ED)

## 2023-10-10 PROCEDURE — 85027 COMPLETE CBC AUTOMATED: CPT

## 2023-10-10 PROCEDURE — 99233 SBSQ HOSP IP/OBS HIGH 50: CPT

## 2023-10-10 PROCEDURE — 87070 CULTURE OTHR SPECIMN AEROBIC: CPT | Mod: GEALAB | Performed by: NURSE PRACTITIONER

## 2023-10-10 PROCEDURE — 0W9B30Z DRAINAGE OF LEFT PLEURAL CAVITY WITH DRAINAGE DEVICE, PERCUTANEOUS APPROACH: ICD-10-PCS | Performed by: INTERNAL MEDICINE

## 2023-10-10 PROCEDURE — 96372 THER/PROPH/DIAG INJ SC/IM: CPT

## 2023-10-10 PROCEDURE — 88112 CYTOPATH CELL ENHANCE TECH: CPT | Mod: TC,GEALAB | Performed by: NURSE PRACTITIONER

## 2023-10-10 PROCEDURE — 5A09357 ASSISTANCE WITH RESPIRATORY VENTILATION, LESS THAN 24 CONSECUTIVE HOURS, CONTINUOUS POSITIVE AIRWAY PRESSURE: ICD-10-PCS | Performed by: INTERNAL MEDICINE

## 2023-10-10 PROCEDURE — 87116 MYCOBACTERIA CULTURE: CPT | Mod: GEALAB | Performed by: NURSE PRACTITIONER

## 2023-10-10 PROCEDURE — 71045 X-RAY EXAM CHEST 1 VIEW: CPT | Performed by: RADIOLOGY

## 2023-10-10 PROCEDURE — 82945 GLUCOSE OTHER FLUID: CPT | Mod: GEALAB | Performed by: NURSE PRACTITIONER

## 2023-10-10 PROCEDURE — 2500000002 HC RX 250 W HCPCS SELF ADMINISTERED DRUGS (ALT 637 FOR MEDICARE OP, ALT 636 FOR OP/ED): Performed by: INTERNAL MEDICINE

## 2023-10-10 PROCEDURE — 88305 TISSUE EXAM BY PATHOLOGIST: CPT | Performed by: PATHOLOGY

## 2023-10-10 PROCEDURE — 94640 AIRWAY INHALATION TREATMENT: CPT

## 2023-10-10 PROCEDURE — 36415 COLL VENOUS BLD VENIPUNCTURE: CPT

## 2023-10-10 PROCEDURE — 83986 ASSAY PH BODY FLUID NOS: CPT | Mod: GEALAB | Performed by: NURSE PRACTITIONER

## 2023-10-10 PROCEDURE — 94668 MNPJ CHEST WALL SBSQ: CPT

## 2023-10-10 PROCEDURE — 92526 ORAL FUNCTION THERAPY: CPT | Mod: GN

## 2023-10-10 PROCEDURE — 89050 BODY FLUID CELL COUNT: CPT | Performed by: NURSE PRACTITIONER

## 2023-10-10 PROCEDURE — 36415 COLL VENOUS BLD VENIPUNCTURE: CPT | Performed by: NURSE PRACTITIONER

## 2023-10-10 PROCEDURE — 83615 LACTATE (LD) (LDH) ENZYME: CPT | Performed by: NURSE PRACTITIONER

## 2023-10-10 PROCEDURE — 99232 SBSQ HOSP IP/OBS MODERATE 35: CPT

## 2023-10-10 PROCEDURE — 87015 SPECIMEN INFECT AGNT CONCNTJ: CPT | Mod: GEALAB | Performed by: NURSE PRACTITIONER

## 2023-10-10 PROCEDURE — 88305 TISSUE EXAM BY PATHOLOGIST: CPT | Mod: TC,MCY,CMCLAB,GEALAB | Performed by: NURSE PRACTITIONER

## 2023-10-10 PROCEDURE — 83615 LACTATE (LD) (LDH) ENZYME: CPT | Mod: CMCLAB,GEALAB | Performed by: NURSE PRACTITIONER

## 2023-10-10 PROCEDURE — 32555 ASPIRATE PLEURA W/ IMAGING: CPT | Performed by: NURSE PRACTITIONER

## 2023-10-10 PROCEDURE — 83735 ASSAY OF MAGNESIUM: CPT

## 2023-10-10 PROCEDURE — 84155 ASSAY OF PROTEIN SERUM: CPT | Performed by: NURSE PRACTITIONER

## 2023-10-10 PROCEDURE — 71045 X-RAY EXAM CHEST 1 VIEW: CPT

## 2023-10-10 PROCEDURE — 84157 ASSAY OF PROTEIN OTHER: CPT | Mod: GEALAB | Performed by: NURSE PRACTITIONER

## 2023-10-10 PROCEDURE — 2500000004 HC RX 250 GENERAL PHARMACY W/ HCPCS (ALT 636 FOR OP/ED): Performed by: INTERNAL MEDICINE

## 2023-10-10 PROCEDURE — 87102 FUNGUS ISOLATION CULTURE: CPT | Mod: CMCLAB,GEALAB | Performed by: NURSE PRACTITIONER

## 2023-10-10 PROCEDURE — 88112 CYTOPATH CELL ENHANCE TECH: CPT | Performed by: PATHOLOGY

## 2023-10-10 RX ORDER — POTASSIUM CHLORIDE 1.5 G/1.58G
40 POWDER, FOR SOLUTION ORAL ONCE
Status: COMPLETED | OUTPATIENT
Start: 2023-10-10 | End: 2023-10-10

## 2023-10-10 RX ORDER — POTASSIUM CHLORIDE 20 MEQ/1
20 TABLET, EXTENDED RELEASE ORAL ONCE
Status: DISCONTINUED | OUTPATIENT
Start: 2023-10-10 | End: 2023-10-10

## 2023-10-10 RX ORDER — POTASSIUM CHLORIDE 20 MEQ/1
40 TABLET, EXTENDED RELEASE ORAL ONCE
Status: DISCONTINUED | OUTPATIENT
Start: 2023-10-10 | End: 2023-10-10

## 2023-10-10 RX ADMIN — PREDNISONE 40 MG: 20 TABLET ORAL at 08:29

## 2023-10-10 RX ADMIN — Medication 3 L/MIN: at 08:40

## 2023-10-10 RX ADMIN — POTASSIUM CHLORIDE 20 MEQ: 1.5 POWDER, FOR SOLUTION ORAL at 08:29

## 2023-10-10 RX ADMIN — IPRATROPIUM BROMIDE AND ALBUTEROL SULFATE 3 ML: 2.5; .5 SOLUTION RESPIRATORY (INHALATION) at 13:51

## 2023-10-10 RX ADMIN — SERTRALINE HYDROCHLORIDE 25 MG: 50 TABLET ORAL at 08:29

## 2023-10-10 RX ADMIN — PANTOPRAZOLE SODIUM 40 MG: 40 TABLET, DELAYED RELEASE ORAL at 06:16

## 2023-10-10 RX ADMIN — PIPERACILLIN SODIUM AND TAZOBACTAM SODIUM 4.5 G: 4; .5 INJECTION, SOLUTION INTRAVENOUS at 10:51

## 2023-10-10 RX ADMIN — TIOTROPIUM BROMIDE INHALATION SPRAY 2 PUFF: 3.12 SPRAY, METERED RESPIRATORY (INHALATION) at 08:22

## 2023-10-10 RX ADMIN — ENOXAPARIN SODIUM 30 MG: 30 INJECTION SUBCUTANEOUS at 13:45

## 2023-10-10 RX ADMIN — POTASSIUM CHLORIDE 40 MEQ: 1.5 POWDER, FOR SOLUTION ORAL at 10:50

## 2023-10-10 RX ADMIN — Medication 2 L/MIN: at 20:02

## 2023-10-10 RX ADMIN — PIPERACILLIN SODIUM AND TAZOBACTAM SODIUM 4.5 G: 4; .5 INJECTION, SOLUTION INTRAVENOUS at 06:14

## 2023-10-10 RX ADMIN — MIRTAZAPINE 15 MG: 15 TABLET, FILM COATED ORAL at 22:10

## 2023-10-10 RX ADMIN — PIPERACILLIN SODIUM AND TAZOBACTAM SODIUM 4.5 G: 4; .5 INJECTION, SOLUTION INTRAVENOUS at 22:52

## 2023-10-10 RX ADMIN — FLUTICASONE FUROATE AND VILANTEROL TRIFENATATE 1 PUFF: 100; 25 POWDER RESPIRATORY (INHALATION) at 08:26

## 2023-10-10 RX ADMIN — PIPERACILLIN SODIUM AND TAZOBACTAM SODIUM 4.5 G: 4; .5 INJECTION, SOLUTION INTRAVENOUS at 17:05

## 2023-10-10 RX ADMIN — ATORVASTATIN CALCIUM 80 MG: 80 TABLET, FILM COATED ORAL at 22:10

## 2023-10-10 RX ADMIN — POTASSIUM CHLORIDE 20 MEQ: 1.5 POWDER, FOR SOLUTION ORAL at 22:15

## 2023-10-10 RX ADMIN — IPRATROPIUM BROMIDE AND ALBUTEROL SULFATE 3 ML: 2.5; .5 SOLUTION RESPIRATORY (INHALATION) at 20:02

## 2023-10-10 RX ADMIN — QUETIAPINE FUMARATE 12.5 MG: 25 TABLET ORAL at 22:10

## 2023-10-10 RX ADMIN — Medication 5 MG: at 22:10

## 2023-10-10 RX ADMIN — IPRATROPIUM BROMIDE AND ALBUTEROL SULFATE 3 ML: 2.5; .5 SOLUTION RESPIRATORY (INHALATION) at 08:04

## 2023-10-10 ASSESSMENT — COGNITIVE AND FUNCTIONAL STATUS - GENERAL
DRESSING REGULAR UPPER BODY CLOTHING: A LOT
WALKING IN HOSPITAL ROOM: TOTAL
HELP NEEDED FOR BATHING: A LOT
WALKING IN HOSPITAL ROOM: TOTAL
HELP NEEDED FOR BATHING: A LOT
PERSONAL GROOMING: A LITTLE
STANDING UP FROM CHAIR USING ARMS: TOTAL
STANDING UP FROM CHAIR USING ARMS: TOTAL
DAILY ACTIVITIY SCORE: 13
CLIMB 3 TO 5 STEPS WITH RAILING: TOTAL
DRESSING REGULAR UPPER BODY CLOTHING: A LOT
TOILETING: TOTAL
MOBILITY SCORE: 11
TURNING FROM BACK TO SIDE WHILE IN FLAT BAD: A LITTLE
MOVING TO AND FROM BED TO CHAIR: A LOT
DRESSING REGULAR LOWER BODY CLOTHING: TOTAL
EATING MEALS: A LITTLE
DRESSING REGULAR LOWER BODY CLOTHING: A LOT
CLIMB 3 TO 5 STEPS WITH RAILING: TOTAL
MOBILITY SCORE: 7
WALKING IN HOSPITAL ROOM: TOTAL
MOVING TO AND FROM BED TO CHAIR: A LOT
MOBILITY SCORE: 10
CLIMB 3 TO 5 STEPS WITH RAILING: TOTAL
TURNING FROM BACK TO SIDE WHILE IN FLAT BAD: A LOT
PERSONAL GROOMING: A LOT
TOILETING: A LOT
STANDING UP FROM CHAIR USING ARMS: A LOT
MOVING FROM LYING ON BACK TO SITTING ON SIDE OF FLAT BED WITH BEDRAILS: A LOT
MOVING FROM LYING ON BACK TO SITTING ON SIDE OF FLAT BED WITH BEDRAILS: A LITTLE
MOVING FROM LYING ON BACK TO SITTING ON SIDE OF FLAT BED WITH BEDRAILS: A LOT
DAILY ACTIVITIY SCORE: 13
TURNING FROM BACK TO SIDE WHILE IN FLAT BAD: TOTAL
MOVING TO AND FROM BED TO CHAIR: TOTAL

## 2023-10-10 ASSESSMENT — PAIN SCALES - GENERAL
PAINLEVEL_OUTOF10: 0 - NO PAIN
PAINLEVEL_OUTOF10: 0 - NO PAIN

## 2023-10-10 ASSESSMENT — PAIN - FUNCTIONAL ASSESSMENT
PAIN_FUNCTIONAL_ASSESSMENT: 0-10
PAIN_FUNCTIONAL_ASSESSMENT: 0-10

## 2023-10-10 NOTE — PROGRESS NOTES
Speech-Language Pathology    Inpatient  Speech-Language Pathology Treatment     Patient Name: Christie Arias  MRN: [id number]  Today's Date: 10/10/2023  Time Calculation  Start Time: 0938  Stop Time: 0943  Time Calculation (min): 5 min         Current Problem:   Patient Active Problem List   Diagnosis    Respiratory failure (CMS/HCC)    Severe protein-calorie malnutrition (CMS/HCC)    Dementia with psychosis (CMS/HCC)    Iron deficiency anemia    Anxiety    Chronic obstructive pulmonary disease (CMS/HCC)    Failure to thrive in adult    HTN (hypertension)    Hyperlipidemia    Pneumonia         SLP Assessment:  Managing current diet without observable dysphagia.  She was assessed as she fed herself breakfast. Slow but adequate mastication of solids. Timely AP propulsion of liquids. No coughing/ choking/ change in vocal quality after any swallows.     Swallowing appears to be at baseline.        Plan:  SLP Frequency: 1x per week  Duration: 2 weeks  ST GOALS:  Patient will implement safe swallowing strategies to reduce risk of aspiration given caregiver assistance/cueing as needed.    Pt will participate in a repeat bedside swallow exam during this admission.                      LONG TERM GOAL  Patient will tolerate the least restrictive diet without overt difficulty by time of discharge.- GOALS ACHIEVED    Subjective :    Most Recent Visit:  SLP Received On: 10/05/23    General Visit Information:   Reason for Referral: Concern for dysphagia.      Pain Assessment:   Pain Assessment: 0-10  Pain Score: 0 - No pain      Objective       Therapeutic Swallow:  Liquid Diet Recommendations: Thin (IDDSI Level 0)  Solid Diet Recommendations: Regular consistency diet/ IDDSI level 7      Inpatient:  Education Documentation  No documentation found.  Education Comments  No comments found.

## 2023-10-10 NOTE — PROGRESS NOTES
Physical Therapy    Physical Therapy Treatment    Patient Name: Christie Arias  MRN: [id number]  Today's Date: 10/10/2023  Time Calculation  Start Time: 1510  Stop Time: 1533  Time Calculation (min): 23 min       Assessment/Plan   PT Assessment  PT Assessment Results: Decreased strength, Decreased endurance, Impaired balance, Decreased mobility, Decreased coordination, Decreased cognition  Rehab Prognosis: Fair  Barriers to Discharge: Cognition, anxious behaviors  Evaluation/Treatment Tolerance: Patient limited by fatigue  Medical Staff Made Aware: Yes  Strengths: Housing layout, Support of Caregivers  End of Session Communication: Bedside nurse  End of Session Patient Position: Bed, 3 rail up, Alarm on  PT Plan  Inpatient/Swing Bed or Outpatient: Inpatient  PT Plan  Treatment/Interventions: Bed mobility, Transfer training, Balance training  PT Plan: Skilled PT  PT Frequency: 3 times per week  Equipment Recommended upon Discharge: Wheeled walker  PT Recommended Transfer Status: Assist x2      General Visit Information:   PT  Visit  PT Received On: 10/10/23  General  Family/Caregiver Present: No  Prior to Session Communication: Bedside nurse  Patient Position Received: Bed, 3 rail up  Preferred Learning Style: kinesthetic  General Comment:  (Left hemiparesis)    Subjective   Precautions:  Precautions  Medical Precautions: Fall precautions, Oxygen therapy device and L/min (4L O2, IV, purewick)  Vital Signs:  Vital Signs  SpO2: 96 % (on2L o2 with slight d4esat when sitting EOB to 91-92%)    Objective   Pain:  Pain Assessment  Pain Assessment: 0-10  Pain Score: 0 - No pain  Cognition:  Cognition  Overall Cognitive Status: Within Functional Limits (communication appears limited, is silent with yes ansers appropriately but verbalize appropriate words when asked,)  Orientation Level: Oriented X4  Postural Control:  Postural Control  Postural Control: Impaired (unable to sit EOB with bilat UE support and Min A without  leaning/falling to the right side and retro-leaning)  Trunk Control:  (limited and unable)  Righting Reactions:  (poor)  Extremity/Trunk Assessments:    Activity Tolerance:  Activity Tolerance  Endurance: Tolerates 10 - 20 min exercise with multiple rests  Treatments:  Therapeutic Exercise  Therapeutic Exercise Performed: Yes  Therapeutic Exercise Activity 1: Supine (AAROM bilat LE's ankle pumps. quad sets, heel slides and reistive hip/knee extension all x10 reps)    Therapeutic Activity  Therapeutic Activity Performed: Yes  Therapeutic Activity 1: sitting EOB (MOD A x1 to sit EOB [id number] with and tihtout bilat UE support, leaning heavily to right, was not righting self would try with VC but unable to comlplete without assistance.)              Bed Mobility 1  Bed Mobility 1: Supine to sitting, Sitting to supine (Max A x1 with VC for safety and technique)  Level of Assistance 1: Maximum assistance, Maximum verbal cues, Maximum tactile cues  Bed Mobility Comments 1:  (log roll with vc for safety and technique)    Outcome Measures:  [facility] Basic Mobility  Turning from your back to your side while in a flat bed without using bedrails: A lot  Moving from lying on your back to sitting on the side of a flat bed without using bedrails: A lot  Moving to and from bed to chair (including a wheelchair): A lot  Standing up from a chair using your arms (e.g. wheelchair or bedside chair): A lot  To walk in hospital room: Total  Climbing 3-5 steps with railing: Total  Basic Mobility - Total Score: 10    Education Documentation  Precautions, taught by Misty Samuels PTA at 10/10/2023  3:56 PM.  Learner: Patient  Readiness: Acceptance  Method: Explanation  Response: Needs Reinforcement    Mobility Training, taught by Misty Samuels PTA at 10/10/2023  3:56 PM.  Learner: Patient  Readiness: Acceptance  Method: Explanation  Response: Needs Reinforcement    Education Comments  No comments found.        OP  EDUCATION:  Education  Individual(s) Educated: Patient    Encounter Problems       Encounter Problems (Active)       Balance       STG - Maintains dynamic sitting balance with upper extremity support x 10' supervision  (Progressing)       Start:  10/03/23    Expected End:  10/17/23       INTERVENTIONS:  1. Practice sitting on the edge of a bed/mat with minimal support.  2. Educate patient about maintining total hip precautions while maintaining balance.  3. Educate patient about pressure relief.  4. Educate patient about use of assistive device.            Pain - Adult          Transfers       STG - Patient will perform bed mobility CGA  (Progressing)       Start:  10/03/23    Expected End:  10/17/23            STG - Patient will stand pivot and sit<>stand transfers min A  (Progressing)       Start:  10/03/23    Expected End:  10/17/23

## 2023-10-10 NOTE — PROGRESS NOTES
Christie Arias is a 68 y.o. female on day 8 of admission presenting with Chronic obstructive pulmonary disease (CMS/HCC).      Subjective     No acute events overnight. Patient has improvement of respiratory status after the bronchoscopy. Patient also received thoracentesis today. No acute complaints at this time.      Objective     Last Recorded Vitals  BP 94/63   Pulse 71   Temp 36.7 °C (98.1 °F)   Resp 22   Wt 52.4 kg (115 lb 9.6 oz)   SpO2 93%   Intake/Output last 3 Shifts:    Intake/Output Summary (Last 24 hours) at 10/10/2023 1456  Last data filed at 10/10/2023 1253  Gross per 24 hour   Intake 1615.54 ml   Output 500 ml   Net 1115.54 ml       Admission Weight  Weight: 52.4 kg (115 lb 8.3 oz) (10/01/23 2306)    Daily Weight  10/10/23 : 52.4 kg (115 lb 9.6 oz)    Image Results  XR chest 1 view  Narrative: Interpreted By:  Valentín Sanders,   STUDY:  XR CHEST 1 VIEW;  10/10/2023 1:45 pm      INDICATION:  Signs/Symptoms:S/P LEFT THORACENTESIS.      COMPARISON:  10/09/2023 and 10/08/2023 portable AP chest x-rays and the 10/02/2023  thoracic CTA.      ACCESSION NUMBER(S):  [id number]      ORDERING CLINICIAN:  REN MCLEOD      FINDINGS:  Portable AP upright chest x-ray 10/10/2023:              CARDIOMEDIASTINAL SILHOUETTE:  Cardiomediastinal silhouette is normal in size and configuration  allowing for the AP lordotic projection. Extensive vascular  calcification including coronary artery calcification is noted. A  thoracoabdominal junction aortic aneurysm demonstrated on CT is  difficult to appreciate.      LUNGS:  Marked improvement in the dense opacification of the mid and lower  left chest present yesterday. Dense left inferomedial thoracic  opacity has a sharp lateral margin suggesting lower lobe collapse and  consolidation, but a left pleural effusion can not be ruled out.  Lateral costophrenic angle blunting and basilar haziness on the right  suggest a small pleural effusion. Emphysema with  pulmonary  hyperinflation as reported on CT.      ABDOMEN:  No remarkable upper abdominal findings.      BONES:  No acute osseous changes. Osteoporosis and a T7 vertebral body  compression fracture are noted.      Impression: 1. Much better aeration of the left lung than yesterday. Left  retrocardiac opacity suggests collapse and dense consolidation of the  left lower lobe.  2. Small right and perhaps left pleural effusions.  3. Emphysema.  4. Marked coronary artery calcification.              MACRO:  None      Signed by: Valentín Sanders 10/10/2023 2:24 PM  Dictation workstation:   [id number]  Thoracentesis  BROWN Salmeron     10/10/2023  1:14 PM  Thoracentesis    Date/Time: 10/10/2023 1:08 PM    Performed by: BROWN Salmeron  Authorized by: Dax Pat MD    Consent:     Consent obtained:  Written    Consent given by:  Patient    Risks, benefits, and alternatives were discussed: yes      Risks discussed:  Bleeding, infection, pain, incomplete drainage and   pneumothorax    Alternatives discussed:  No treatment  Universal protocol:     Procedure explained and questions answered to patient or proxy's   satisfaction: yes      Relevant documents present and verified: yes      Test results available: yes      Imaging studies available: yes      Required blood products, implants, devices, and special equipment   available: yes      Site/side marked: yes      Immediately prior to procedure, a time out was called: yes      Patient identity confirmed:  Verbally with patient and arm band  Sedation:     Sedation type:  None  Anesthesia:     Anesthesia method:  Local infiltration    Local anesthetic:  Lidocaine 1% w/o epi  Procedure details:     Preparation: Patient was prepped and draped in usual sterile fashion      Patient position:  Sitting    Location:  L posterior    Puncture method:  Over-the-needle catheter    Ultrasound guidance: yes      Indwelling catheter placed: no      Needle gauge:   18    Catheter size:  8 Fr    Number of attempts:  1    Drainage characteristics:  Clear  Post-procedure details:     Chest x-ray performed: yes      Procedure completion:  Tolerated well, no immediate complications  Comments:      Labs and CXR ordered post procedure, a total of 950cc of clear pale   yellow fluid was removed.      Physical Exam  Constitutional:       General: She is not in acute distress.     Appearance: She is underweight.   Cardiovascular:      Rate and Rhythm: Normal rate and regular rhythm.      Pulses: Normal pulses.      Heart sounds: Normal heart sounds. No murmur heard.  Pulmonary:      Effort: Pulmonary effort is normal. No respiratory distress.      Breath sounds: Normal breath sounds. No wheezing.   Abdominal:      General: Abdomen is flat. Bowel sounds are normal. There is no distension.      Palpations: Abdomen is soft.      Tenderness: There is no abdominal tenderness.   Musculoskeletal:      Right lower leg: No edema.      Left lower leg: No edema.   Neurological:      General: No focal deficit present.      Mental Status: She is alert and oriented to person, place, and time. Mental status is at baseline.         Relevant Results  Scheduled medications  atorvastatin, 80 mg, oral, Nightly  enoxaparin, 30 mg, subcutaneous, q24h  fluticasone furoate-vilanteroL, 1 puff, inhalation, Daily  ipratropium-albuteroL, 3 mL, nebulization, TID  melatonin, 5 mg, oral, Nightly  mirtazapine, 15 mg, oral, q24h  pantoprazole, 40 mg, oral, Daily before breakfast  piperacillin-tazobactam, 4.5 g, intravenous, q6h  potassium chloride, 20 mEq, oral, BID  predniSONE, 40 mg, oral, Daily  QUEtiapine, 12.5 mg, oral, Nightly  sertraline, 25 mg, oral, Daily  tiotropium, 2 puff, inhalation, Daily      Continuous medications     PRN medications  PRN medications: acetaminophen **OR** [DISCONTINUED] acetaminophen **OR** [DISCONTINUED] acetaminophen, docusate sodium, guaiFENesin, ipratropium-albuteroL, ondansetron ODT  **OR** [DISCONTINUED] ondansetron, oxygen, polyethylene glycol    Results for orders placed or performed during the hospital encounter of 10/01/23 (from the past 24 hour(s))   CBC   Result Value Ref Range    WBC 9.7 4.4 - 11.3 x10*3/uL    nRBC 0.0 0.0 - 0.0 /100 WBCs    RBC 3.44 (L) 4.00 - 5.20 x10*6/uL    Hemoglobin 8.2 (L) 12.0 - 16.0 g/dL    Hematocrit 27.1 (L) 36.0 - 46.0 %    MCV 79 (L) 80 - 100 fL    MCH 23.8 (L) 26.0 - 34.0 pg    MCHC 30.3 (L) 32.0 - 36.0 g/dL    RDW 22.8 (H) 11.5 - 14.5 %    Platelets 336 150 - 450 x10*3/uL    MPV 11.0 7.5 - 11.5 fL   Renal function panel   Result Value Ref Range    Glucose 84 74 - 99 mg/dL    Sodium 140 136 - 145 mmol/L    Potassium 3.0 (L) 3.5 - 5.3 mmol/L    Chloride 102 98 - 107 mmol/L    Bicarbonate 26 21 - 32 mmol/L    Anion Gap 15 10 - 20 mmol/L    Urea Nitrogen 20 6 - 23 mg/dL    Creatinine 1.06 (H) 0.50 - 1.05 mg/dL    eGFR 57 (L) >60 mL/min/1.73m*2    Calcium 7.7 (L) 8.6 - 10.3 mg/dL    Phosphorus 4.7 2.5 - 4.9 mg/dL    Albumin 3.1 (L) 3.4 - 5.0 g/dL   Magnesium   Result Value Ref Range    Magnesium 1.74 1.60 - 2.40 mg/dL   Body Fluid Cell Count   Result Value Ref Range    Color, Fluid Straw Colorless, Straw, Yellow    Clarity, Fluid Clear Clear    WBC, Fluid 199 See Comment /uL    RBC, Fluid 86 0  /uL /uL   Protein, Total   Result Value Ref Range    Total Protein 5.6 (L) 6.4 - 8.2 g/dL   Lactate Dehydrogenase   Result Value Ref Range     84 - 246 U/L       Assessment/Plan     Christie Arias is a 68 y.o. 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 recently started on antibiotic Zosyn (10/7-).  Pulm following and underwent thoracentesis today.    Acute Issues:   #Acute hypoxic and hypercapneic respiratory failure, likely 2/2 COPD exacerbation  #LLL mucous plugging and  atelectasis.  #Left pleural effusion  #R/o PNA  - Able to wean down from Airvo to 3L NC s/p bronchoscopy where complete mucus obstruction of left side was able to be removed.   - This afternoon, the patient underwent a left thoracentesis draining 950 mL of straw colored fluid. Repeat CXR s/p thora showed significant improvement aeration of the left lung  - Continue Prednisone 40mg (10/2 - current)  - continue Scheduled Duonebs TID and PRN, Spiriva, Breo  - continue Bronchial hygiene (acapella) and incentive spirometry; chest physiotherapy  - Continue Zosyn (10/7 - )     #Hypokalemia   - This AM, K+ 3.0, given one time dose of K-Cammie 40 mEq  - Continue 20 mEq K-Cammie packets BID     #Acute on chronic anemia likely 2/2 JIE, stable  - Cont IV protonix 40mg daily  - Hold PO iron supplementation per GI recs as it may produce black stools  - Plan for OP heme onc follow-up after discharge.      #Failure to thrive  - consult nutrition, PT/OT following.      Chronic Issues:   #HTN: Holding home amlodipine 10 mg  #CVA: Continue atorvastatin 80 mg  #MDD: cont zoloft and remeron  #Anxiety: start seroquel 12.5mg at bedtime     F: None  E: Replete as needed  N: Regular diet with magic cup supplementation  G: Protonix  VTE: Lovenox     Code status: DNR and No Intubation   Disposition: 68 y.o. female who was admitted for acute hypoxic respiratory failure 2/2 COPD exacerbation. Patient continues to show improvement in respiratory status with bronchoscopy yesterday and thoracentesis today. Continuing Zosyn, steroids, and breathing treatments. Continuing to be able to wean down NC O2.     Kita Lr MD

## 2023-10-10 NOTE — PROGRESS NOTES
Christie Arias is a 68 y.o. female on day 8 of admission presenting with Chronic obstructive pulmonary disease (CMS/HCC).    Subjective   Interval History: no fever, no new complaints         Review of Systems    Objective   Range of Vitals (last 24 hours)  Heart Rate:  [66-97]   Temp:  [36.1 °C (97 °F)-36.8 °C (98.2 °F)]   Resp:  [18-27]   BP: ()/(61-77)   Weight:  [52.4 kg (115 lb 9.6 oz)]   SpO2:  [91 %-100 %]   Daily Weight  10/10/23 : 52.4 kg (115 lb 9.6 oz)    Body mass index is 17.07 kg/m².    Physical Exam  Constitutional:       Appearance: Normal appearance.   HENT:      Head: Normocephalic and atraumatic.      Mouth/Throat:      Mouth: Mucous membranes are moist.      Pharynx: Oropharynx is clear.   Eyes:      Pupils: Pupils are equal, round, and reactive to light.   Cardiovascular:      Rate and Rhythm: Normal rate and regular rhythm.      Heart sounds: Normal heart sounds.   Pulmonary:      Effort: Pulmonary effort is normal.      Breath sounds: Normal breath sounds.   Abdominal:      General: Abdomen is flat. Bowel sounds are normal.      Palpations: Abdomen is soft.   Musculoskeletal:      Cervical back: Normal range of motion.   Neurological:      Mental Status: She is alert.         Antibiotics  sodium chloride 0.9 % bolus 500 mL  methylPREDNISolone sod succinate (PF) (SOLU-Medrol) injection 125 mg  piperacillin-tazobactam-dextrose (Zosyn) IV 4.5 g  vancomycin in dextrose 5 % (Vancocin) IVPB 1 g  oxygen (O2) therapy  sodium chloride 0.9 % bolus 1,572 mL  iohexol (OMNIPaque) 350 mg iodine/mL injection 100 mL  oxygen (O2) therapy  ondansetron ODT (Zofran-ODT) disintegrating tablet 4 mg  ondansetron (Zofran) injection 4 mg  acetaminophen (Tylenol) tablet 650 mg  acetaminophen (Tylenol) oral liquid 650 mg  acetaminophen (Tylenol) suppository 650 mg  pantoprazole (ProtoNix) injection 80 mg  pantoprazole (ProtoNix) injection 40 mg  ipratropium-albuteroL (Duo-Neb) 0.5-2.5 mg/3 mL nebulizer  solution 3 mL  piperacillin-tazobactam-dextrose (Zosyn) IV 3.375 g  predniSONE (Deltasone) tablet 40 mg  vancomycin in dextrose 5 % (Vancocin) IVPB 500 mg  ipratropium-albuteroL (Duo-Neb) 0.5-2.5 mg/3 mL nebulizer solution 3 mL  ipratropium-albuteroL (Duo-Neb) 0.5-2.5 mg/3 mL nebulizer solution 3 mL  ipratropium-albuteroL (Duo-Neb) 0.5-2.5 mg/3 mL nebulizer solution 3 mL  tiotropium (Spiriva Respimat) 2.5 mcg/actuation inhaler 2 puff  fluticasone furoate-vilanteroL (Breo Ellipta) 100-25 mcg/dose inhaler 1 puff  amLODIPine (Norvasc) tablet  aspirin EC tablet      traMADol (Ultram) tablet  pantoprazole (ProtoNix) EC tablet  mirtazapine (Remeron) tablet      nitroglycerin (Nitrostat) SL tablet  potassium chloride SA (Klor-Con M20) ER tablet  sertraline (Zoloft) tablet  acetaminophen (Tylenol) tablet  ondansetron (Zofran) tablet  acetaminophen (Tylenol) tablet 325 mg  amLODIPine (Norvasc) tablet 10 mg  atorvastatin (Lipitor) tablet 80 mg  guaiFENesin (Robitussin) 100 mg/5 mL syrup 100 mg  mirtazapine (Remeron) tablet 15 mg  sertraline (Zoloft) tablet 25 mg  potassium chloride IV 20 mEq  oxygen (O2) therapy  ipratropium-albuteroL (Duo-Neb) 0.5-2.5 mg/3 mL nebulizer solution 3 mL  ferrous sulfate 325 (65 Fe) MG tablet 65 mg of iron  oxygen (O2) therapy  iron sucrose (Venofer) 200 mg in sodium chloride 0.9% 100 mL IVPB  melatonin tablet 5 mg  ipratropium-albuteroL (Duo-Neb) 0.5-2.5 mg/3 mL nebulizer solution 3 mL  enoxaparin (Lovenox) syringe 50 mg  piperacillin-tazobactam-dextrose (Zosyn) IV 4.5 g  enoxaparin (Lovenox) syringe 30 mg  piperacillin-tazobactam-dextrose (Zosyn) IV 3.375 g  vancomycin in dextrose 5 % (Vancocin) IVPB 1,000 mg  enoxaparin (Lovenox) syringe 30 mg  ipratropium-albuteroL (Duo-Neb) 0.5-2.5 mg/3 mL nebulizer solution 3 mL  OLANZapine (ZyPREXA) injection 5 mg  potassium chloride IV 20 mEq  potassium chloride (Klor-Con) packet 20 mEq  metoprolol tartrate (Lopressor) 5 mg/5 mL injection  - Omnicell  Override Pull  metoprolol tartrate (Lopressor) injection 5 mg  oxygen (O2) therapy  polyethylene glycol (Glycolax, Miralax) packet 17 g  docusate sodium (Colace) capsule 100 mg  potassium chloride IV 20 mEq  magnesium oxide (Mag-Ox) tablet 400 mg  OLANZapine (ZyPREXA) injection 5 mg  QUEtiapine (SEROquel) tablet 12.5 mg  pantoprazole (ProtoNix) EC tablet 40 mg  oxygen (O2) therapy  ipratropium-albuteroL (Duo-Neb) 0.5-2.5 mg/3 mL nebulizer solution 3 mL  oxygen (O2) therapy  oxygen (O2) therapy  potassium chloride IV 20 mEq  predniSONE (Deltasone) tablet 40 mg  OLANZapine (ZyPREXA) injection 5 mg  labetaloL (Normodyne,Trandate) injection 10 mg  oxygen (O2) therapy  magnesium sulfate IV 2 g  potassium chloride (Klor-Con) packet 20 mEq  piperacillin-tazobactam-dextrose (Zosyn) IV 4.5 g  potassium chloride IVPB 20 mEq  oxygen (O2) therapy  oxygen (O2) therapy  propofol (Diprivan) 10 mg/mL injection  - Omnicell Override Pull  lidocaine (cardiac) (Xylocaine) 100 mg/5 mL (2 %) injection  - Omnicell Override Pull  dexAMETHasone (Decadron) 4 mg/mL injection  - Omnicell Override Pull  rocuronium (ZeMuron) 10 mg/mL injection  - Omnicell Override Pull  succinylcholine (Anectine) 20 mg/mL injection  - Omnicell Override Pull  ondansetron (Zofran) 4 mg/2 mL injection  - Omnicell Override Pull  ondansetron (Zofran) 4 mg/2 mL injection  - Omnicell Override Pull  phenylephrine HCl in 0.9% NaCl 0.4 mg/10 mL (40 mcg/mL) syringe  - Omnicell Override Pull  phenylephrine HCl in 0.9% NaCl 0.4 mg/10 mL (40 mcg/mL) syringe  - Omnicell Override Pull  albumin human 5 % infusion 12.5 g  albumin human 5 % infusion  - Omnicell Override Pull  potassium chloride CR (Klor-Con M20) ER tablet 40 mEq  potassium chloride CR (Klor-Con M20) ER tablet 20 mEq  potassium chloride (Klor-Con) packet 40 mEq  oxygen (O2) therapy      Relevant Results  Labs  Results from last 72 hours   Lab Units 10/10/23  0502 10/09/23  0516 10/08/23  0826   WBC AUTO x10*3/uL  9.7 13.1* 13.6*   HEMOGLOBIN g/dL 8.2* 8.7* 10.0*   HEMATOCRIT % 27.1* 28.6* 33.2*   PLATELETS AUTO x10*3/uL 336 349 406   NEUTROS PCT AUTO %  --  76.2  --    LYMPHS PCT AUTO %  --  13.0  --    MONOS PCT AUTO %  --  10.0  --    EOS PCT AUTO %  --  0.2  --      Results from last 72 hours   Lab Units 10/10/23  0502 10/09/23  0516 10/08/23  0826   SODIUM mmol/L 140 138 135*   POTASSIUM mmol/L 3.0* 3.6 3.1*   CHLORIDE mmol/L 102 101 98   CO2 mmol/L 26 24 25   BUN mg/dL 20 24* 18   CREATININE mg/dL 1.06* 1.01 1.00   GLUCOSE mg/dL 84 87 81   CALCIUM mg/dL 7.7* 8.3* 8.3*   ANION GAP mmol/L 15 17 15   EGFR mL/min/1.73m*2 57* 60* 61   PHOSPHORUS mg/dL 4.7 4.2 3.5     Results from last 72 hours   Lab Units 10/10/23  0502 10/09/23  0516 10/08/23  0826   ALBUMIN g/dL 3.1* 3.1* 3.5     Estimated Creatinine Clearance: 40.9 mL/min (A) (by C-G formula based on SCr of 1.06 mg/dL (H)).  C-Reactive Protein   Date Value Ref Range Status   10/03/2023 0.84 <1.00 mg/dL Final     Microbiology  Susceptibility data from last 14 days.  Collected Specimen Info Organism   10/02/23 Blood culture from Peripheral Venipuncture Coagulase negative staphylococcus     Imaging  Reviewed      Assessment/Plan   Bacteremia, CNS, the repeat BC is negative  Respiratory failure / atelectasis, intermittent hypoxia is likely recurrent atelectasis, less O2   Failure to thrive     Recommendations :  Continue Zosyn  Up in the chair      I spent minutes in the professional and overall care of this patient.      Leslye Cortez MD

## 2023-10-10 NOTE — CARE PLAN
Problem: Skin  Goal: Participates in plan/prevention/treatment measures  10/10/2023 1501 by Trudy Colunga RN  Outcome: Progressing  10/10/2023 1500 by Trudy Colunga RN  Outcome: Progressing  10/10/2023 1457 by Trudy Colunga RN  Outcome: Progressing  Goal: Prevent/manage excess moisture  10/10/2023 1501 by Trudy Colunga RN  Outcome: Progressing  10/10/2023 1500 by Trudy Colunga RN  Outcome: Progressing  10/10/2023 1457 by Trudy Colunga RN  Outcome: Progressing  Goal: Prevent/minimize sheer/friction injuries  10/10/2023 1501 by Trudy Colunga RN  Outcome: Progressing  10/10/2023 1500 by Trudy Colunga RN  Outcome: Progressing  10/10/2023 1457 by Trudy Colunga RN  Outcome: Progressing  Goal: Promote/optimize nutrition  10/10/2023 1501 by Trudy Colunga RN  Outcome: Progressing  10/10/2023 1500 by Trudy Colunga RN  Outcome: Progressing  10/10/2023 1457 by Trudy Colunga RN  Outcome: Progressing   The patient's goals for the shift include keep O2 sat 92% and above    The clinical goals for the shift include wean o2

## 2023-10-10 NOTE — CARE PLAN
Problem: Skin  Goal: Participates in plan/prevention/treatment measures  10/10/2023 1500 by Trudy Colunga RN  Outcome: Progressing  10/10/2023 1457 by Trudy Colunga RN  Outcome: Progressing  Goal: Prevent/manage excess moisture  10/10/2023 1500 by Trudy Colunga RN  Outcome: Progressing  10/10/2023 1457 by Trudy Colunga RN  Outcome: Progressing  Goal: Prevent/minimize sheer/friction injuries  10/10/2023 1500 by Trudy Colunga RN  Outcome: Progressing  10/10/2023 1457 by Trudy Colunga RN  Outcome: Progressing  Goal: Promote/optimize nutrition  10/10/2023 1500 by Trudy Colunga RN  Outcome: Progressing  10/10/2023 1457 by Trudy Colunga RN  Outcome: Progressing   The patient's goals for the shift include keep O2 sat 92% and above    The clinical goals for the shift include wean o2    Over the shift, the patient did not make progress.

## 2023-10-10 NOTE — PROGRESS NOTES
Department of Medicine  Division of Pulmonary, Critical Care, and Sleep Medicine    Christie Arias is a 68 y.o. female on day 8 of admission presenting with Chronic obstructive pulmonary disease (CMS/HCC).    Subjective   Patient on 3L NC this morning. Spoke to patient about getting a thoracentesis today to remove the fluid around her lung to which she agreed. Thoracentesis was completed bedside and patient tolerated well. No new complaints at this time.       Objective     Vital Signs      10/9/2023     3:18 PM 10/9/2023     3:48 PM 10/9/2023     4:27 PM 10/9/2023     8:02 PM 10/10/2023     5:44 AM 10/10/2023     9:54 AM 10/10/2023     1:00 PM   Vitals   Systolic 80 82 107 101 100 93 94   Diastolic 66 62 77 73 67 68 63   Heart Rate 83 79 87 66 74 97 71   Temp   36.6 °C (97.9 °F) 36.1 °C (97 °F) 36.8 °C (98.2 °F) 36.6 °C (97.9 °F) 36.7 °C (98.1 °F)   Resp 27 26  18 20 22 22   Weight (lb)     115.6     BMI     17.07 kg/m2     BSA (m2)     1.6 m2          Physical Exam  Constitutional:       Comments: Ill looking, awake and alert.    HENT:      Head: Normocephalic and atraumatic.      Nose: Nose normal.      Mouth/Throat:      Pharynx: Oropharynx is clear.   Eyes:      Pupils: Pupils are equal, round, and reactive to light.   Cardiovascular:      Rate and Rhythm: Normal rate and regular rhythm.   Pulmonary:      Effort: Pulmonary effort is normal.      Breath sounds: No wheezing or rhonchi.      Comments: Breath sounds improved from yesterday.  Abdominal:      General: There is no distension.      Palpations: Abdomen is soft.      Tenderness: There is no abdominal tenderness.   Musculoskeletal:      Cervical back: Neck supple.      Right lower leg: No edema.      Left lower leg: No edema.   Neurological:      Mental Status: She is oriented to person, place, and time.   Psychiatric:         Mood and Affect: Mood normal.         Behavior: Behavior normal.          Labs:  Lab Results   Component Value Date    WBC  9.7 10/10/2023    HGB 8.2 (L) 10/10/2023    HCT 27.1 (L) 10/10/2023    MCV 79 (L) 10/10/2023     10/10/2023      Lab Results   Component Value Date    GLUCOSE 84 10/10/2023    CALCIUM 7.7 (L) 10/10/2023     10/10/2023    K 3.0 (L) 10/10/2023    CO2 26 10/10/2023     10/10/2023    BUN 20 10/10/2023    CREATININE 1.06 (H) 10/10/2023      Lab Results   Component Value Date    ALT 18 10/02/2023    AST 27 10/02/2023    ALKPHOS 121 10/02/2023    BILITOT 0.5 10/02/2023        Oxygen Therapy  SpO2: 97 %  Oxygen Therapy: Supplemental oxygen  O2 Delivery Method: Nasal cannula  PEEP/CPAP (cm H2O):  [5 cm H20] 5 cm H20    Intake/Output last 3 Shifts:  I/O last 3 completed shifts:  In: 1995.5 (38.1 mL/kg) [P.O.:480; I.V.:15.5 (0.3 mL/kg); Blood:100; IV Piggyback:1400]  Out: 1100 (21 mL/kg) [Urine:1100 (0.6 mL/kg/hr)]  Weight: 52.4 kg       Medications   Scheduled medications  [Held by provider] amLODIPine, 10 mg, oral, Daily  atorvastatin, 80 mg, oral, Nightly  enoxaparin, 30 mg, subcutaneous, q24h  [Held by provider] ferrous sulfate, 65 mg of iron, oral, Daily with breakfast  fluticasone furoate-vilanteroL, 1 puff, inhalation, Daily  ipratropium-albuteroL, 3 mL, nebulization, TID  melatonin, 5 mg, oral, Nightly  mirtazapine, 15 mg, oral, q24h  [START ON 10/11/2023] oxygen, , inhalation, Continuous - 02/gases  pantoprazole, 40 mg, oral, Daily before breakfast  piperacillin-tazobactam, 4.5 g, intravenous, q6h  potassium chloride, 20 mEq, oral, BID  predniSONE, 40 mg, oral, Daily  QUEtiapine, 12.5 mg, oral, Nightly  sertraline, 25 mg, oral, Daily  tiotropium, 2 puff, inhalation, Daily      Continuous medications     PRN medications  PRN medications: acetaminophen **OR** [DISCONTINUED] acetaminophen **OR** [DISCONTINUED] acetaminophen, docusate sodium, guaiFENesin, ipratropium-albuteroL, ondansetron ODT **OR** [DISCONTINUED] ondansetron, oxygen, polyethylene glycol     Allergies  Codeine    Chest Radiograph   XR  chest 1 view 10/09/2023    Narrative  Interpreted By:  Ivory Abdalla,  STUDY:  XR CHEST 1 VIEW;  10/9/2023 4:04 pm    Impression  Bilateral parenchymal infiltration. Bilateral pleural effusions.  Slight improvement of aeration on the left.    MACRO:  none    Signed by: Ivory Abdalla 10/9/2023 4:19 PM  Dictation workstation:   [id number]       Assessment and Plan / Recommendations   Assessment/Plan   68-year-old female with history of hypertension, CVA, COPD admitted with acute hypoxic respiratory failure     1.  Acute hypoxic respiratory failure due to COPD exacerbation, suspected pneumonia, mucous plugging and atelectasis  2.  COPD exacerbation  3.  Suspected pneumonia/mucous plugging and atelectasis-patient with left lower lobe consolidation and atelectasis persistent/worsening since at least April of this year    Hypoxia better today, on 3L NC.  Thoracentesis today with 950cc of clear pale yellow fluid removed.     -Continue prednisone 40 mg daily  -Continue nebs, Breo  -continue bronchial hygiene with Incentive spirometry, Acapella, vest, wean O2 as tolerated.  -Continue Zosyn  -repeat CXR today to evaluate s/p thoracentesis    Ed Chapman, DO  Internal Medicine PGY1

## 2023-10-10 NOTE — PROCEDURES
Thoracentesis    Date/Time: 10/10/2023 1:08 PM    Performed by: ROSALINDA Salmeron-CNP  Authorized by: Dax Pat MD    Consent:     Consent obtained:  Written    Consent given by:  Patient    Risks, benefits, and alternatives were discussed: yes      Risks discussed:  Bleeding, infection, pain, incomplete drainage and pneumothorax    Alternatives discussed:  No treatment  Universal protocol:     Procedure explained and questions answered to patient or proxy's satisfaction: yes      Relevant documents present and verified: yes      Test results available: yes      Imaging studies available: yes      Required blood products, implants, devices, and special equipment available: yes      Site/side marked: yes      Immediately prior to procedure, a time out was called: yes      Patient identity confirmed:  Verbally with patient and arm band  Sedation:     Sedation type:  None  Anesthesia:     Anesthesia method:  Local infiltration    Local anesthetic:  Lidocaine 1% w/o epi  Procedure details:     Preparation: Patient was prepped and draped in usual sterile fashion      Patient position:  Sitting    Location:  L posterior    Puncture method:  Over-the-needle catheter    Ultrasound guidance: yes      Indwelling catheter placed: no      Needle gauge:  18    Catheter size:  8 Fr    Number of attempts:  1    Drainage characteristics:  Clear  Post-procedure details:     Chest x-ray performed: yes      Procedure completion:  Tolerated well, no immediate complications  Comments:      Labs and CXR ordered post procedure, a total of 950cc of clear pale yellow fluid was removed.

## 2023-10-11 ENCOUNTER — APPOINTMENT (OUTPATIENT)
Dept: RADIOLOGY | Facility: HOSPITAL | Age: 68
DRG: 871 | End: 2023-10-11
Payer: MEDICAID

## 2023-10-11 LAB
ALBUMIN SERPL BCP-MCNC: 3.3 G/DL (ref 3.4–5)
ANION GAP SERPL CALC-SCNC: 16 MMOL/L (ref 10–20)
APPARATUS: ABNORMAL
BASE EXCESS BLDV CALC-SCNC: -1.1 MMOL/L (ref -2–3)
BODY TEMPERATURE: 37 DEGREES CELSIUS
BUN SERPL-MCNC: 23 MG/DL (ref 6–23)
CALCIUM SERPL-MCNC: 8 MG/DL (ref 8.6–10.3)
CHLORIDE SERPL-SCNC: 100 MMOL/L (ref 98–107)
CO2 SERPL-SCNC: 25 MMOL/L (ref 21–32)
CREAT SERPL-MCNC: 1.09 MG/DL (ref 0.5–1.05)
ERYTHROCYTE [DISTWIDTH] IN BLOOD BY AUTOMATED COUNT: 23.1 % (ref 11.5–14.5)
FLOW: 6 LPM
GFR SERPL CREATININE-BSD FRML MDRD: 55 ML/MIN/1.73M*2
GLUCOSE FLD-MCNC: 129 MG/DL
GLUCOSE SERPL-MCNC: 87 MG/DL (ref 74–99)
HCO3 BLDV-SCNC: 24.3 MMOL/L (ref 22–26)
HCT VFR BLD AUTO: 29.7 % (ref 36–46)
HGB BLD-MCNC: 9 G/DL (ref 12–16)
LABORATORY COMMENT REPORT: NORMAL
LABORATORY COMMENT REPORT: NORMAL
LDH FLD L TO P-CCNC: 47 U/L
MAGNESIUM SERPL-MCNC: 1.65 MG/DL (ref 1.6–2.4)
MCH RBC QN AUTO: 23.8 PG (ref 26–34)
MCHC RBC AUTO-ENTMCNC: 30.3 G/DL (ref 32–36)
MCV RBC AUTO: 79 FL (ref 80–100)
NRBC BLD-RTO: 0 /100 WBCS (ref 0–0)
OXYHGB MFR BLDV: 95.5 % (ref 45–75)
PATH REPORT.FINAL DX SPEC: NORMAL
PATH REPORT.GROSS SPEC: NORMAL
PATH REPORT.RELEVANT HX SPEC: NORMAL
PATH REPORT.TOTAL CANCER: NORMAL
PCO2 BLDV: 42 MM HG (ref 41–51)
PH BLDV: 7.37 PH (ref 7.33–7.43)
PH FLD: 7.68 [PH]
PHOSPHATE SERPL-MCNC: 4 MG/DL (ref 2.5–4.9)
PLATELET # BLD AUTO: 360 X10*3/UL (ref 150–450)
PMV BLD AUTO: 10.9 FL (ref 7.5–11.5)
PO2 BLDV: 84 MM HG (ref 35–45)
POTASSIUM SERPL-SCNC: 3.1 MMOL/L (ref 3.5–5.3)
PROT FLD-MCNC: 1.6 G/DL
RBC # BLD AUTO: 3.78 X10*6/UL (ref 4–5.2)
SAO2 % BLDV: 97 % (ref 45–75)
SODIUM SERPL-SCNC: 138 MMOL/L (ref 136–145)
TEST COMMENT: ABNORMAL
WBC # BLD AUTO: 12.6 X10*3/UL (ref 4.4–11.3)

## 2023-10-11 PROCEDURE — 96372 THER/PROPH/DIAG INJ SC/IM: CPT

## 2023-10-11 PROCEDURE — 2500000001 HC RX 250 WO HCPCS SELF ADMINISTERED DRUGS (ALT 637 FOR MEDICARE OP)

## 2023-10-11 PROCEDURE — 2060000001 HC INTERMEDIATE ICU ROOM DAILY

## 2023-10-11 PROCEDURE — 80069 RENAL FUNCTION PANEL: CPT

## 2023-10-11 PROCEDURE — 85027 COMPLETE CBC AUTOMATED: CPT

## 2023-10-11 PROCEDURE — 2500000004 HC RX 250 GENERAL PHARMACY W/ HCPCS (ALT 636 FOR OP/ED)

## 2023-10-11 PROCEDURE — 83735 ASSAY OF MAGNESIUM: CPT

## 2023-10-11 PROCEDURE — 94668 MNPJ CHEST WALL SBSQ: CPT

## 2023-10-11 PROCEDURE — 71045 X-RAY EXAM CHEST 1 VIEW: CPT

## 2023-10-11 PROCEDURE — 99233 SBSQ HOSP IP/OBS HIGH 50: CPT | Performed by: INTERNAL MEDICINE

## 2023-10-11 PROCEDURE — 82805 BLOOD GASES W/O2 SATURATION: CPT

## 2023-10-11 PROCEDURE — 2500000002 HC RX 250 W HCPCS SELF ADMINISTERED DRUGS (ALT 637 FOR MEDICARE OP, ALT 636 FOR OP/ED): Performed by: INTERNAL MEDICINE

## 2023-10-11 PROCEDURE — 94640 AIRWAY INHALATION TREATMENT: CPT

## 2023-10-11 PROCEDURE — 2500000004 HC RX 250 GENERAL PHARMACY W/ HCPCS (ALT 636 FOR OP/ED): Performed by: INTERNAL MEDICINE

## 2023-10-11 PROCEDURE — 99232 SBSQ HOSP IP/OBS MODERATE 35: CPT

## 2023-10-11 PROCEDURE — 36415 COLL VENOUS BLD VENIPUNCTURE: CPT

## 2023-10-11 PROCEDURE — 71045 X-RAY EXAM CHEST 1 VIEW: CPT | Performed by: RADIOLOGY

## 2023-10-11 RX ORDER — POTASSIUM CHLORIDE 1.5 G/1.58G
40 POWDER, FOR SOLUTION ORAL ONCE
Status: COMPLETED | OUTPATIENT
Start: 2023-10-11 | End: 2023-10-11

## 2023-10-11 RX ORDER — FUROSEMIDE 10 MG/ML
20 INJECTION INTRAMUSCULAR; INTRAVENOUS ONCE
Status: COMPLETED | OUTPATIENT
Start: 2023-10-11 | End: 2023-10-11

## 2023-10-11 RX ADMIN — PREDNISONE 40 MG: 20 TABLET ORAL at 08:57

## 2023-10-11 RX ADMIN — PANTOPRAZOLE SODIUM 40 MG: 40 TABLET, DELAYED RELEASE ORAL at 06:45

## 2023-10-11 RX ADMIN — IPRATROPIUM BROMIDE AND ALBUTEROL SULFATE 3 ML: 2.5; .5 SOLUTION RESPIRATORY (INHALATION) at 14:52

## 2023-10-11 RX ADMIN — FUROSEMIDE 20 MG: 10 INJECTION, SOLUTION INTRAMUSCULAR; INTRAVENOUS at 04:39

## 2023-10-11 RX ADMIN — Medication 5 MG: at 20:39

## 2023-10-11 RX ADMIN — FLUTICASONE FUROATE AND VILANTEROL TRIFENATATE 1 PUFF: 100; 25 POWDER RESPIRATORY (INHALATION) at 08:59

## 2023-10-11 RX ADMIN — FUROSEMIDE 20 MG: 10 INJECTION, SOLUTION INTRAMUSCULAR; INTRAVENOUS at 11:35

## 2023-10-11 RX ADMIN — QUETIAPINE FUMARATE 12.5 MG: 25 TABLET ORAL at 20:39

## 2023-10-11 RX ADMIN — SERTRALINE HYDROCHLORIDE 25 MG: 50 TABLET ORAL at 08:57

## 2023-10-11 RX ADMIN — PIPERACILLIN SODIUM AND TAZOBACTAM SODIUM 4.5 G: 4; .5 INJECTION, SOLUTION INTRAVENOUS at 04:40

## 2023-10-11 RX ADMIN — ENOXAPARIN SODIUM 30 MG: 30 INJECTION SUBCUTANEOUS at 13:37

## 2023-10-11 RX ADMIN — PIPERACILLIN SODIUM AND TAZOBACTAM SODIUM 4.5 G: 4; .5 INJECTION, SOLUTION INTRAVENOUS at 17:07

## 2023-10-11 RX ADMIN — Medication 2 L/MIN: at 19:43

## 2023-10-11 RX ADMIN — ACETAMINOPHEN 650 MG: 325 TABLET ORAL at 13:42

## 2023-10-11 RX ADMIN — IPRATROPIUM BROMIDE AND ALBUTEROL SULFATE 3 ML: 2.5; .5 SOLUTION RESPIRATORY (INHALATION) at 08:30

## 2023-10-11 RX ADMIN — POTASSIUM CHLORIDE 20 MEQ: 1.5 POWDER, FOR SOLUTION ORAL at 20:40

## 2023-10-11 RX ADMIN — MIRTAZAPINE 15 MG: 15 TABLET, FILM COATED ORAL at 20:40

## 2023-10-11 RX ADMIN — TIOTROPIUM BROMIDE INHALATION SPRAY 2 PUFF: 3.12 SPRAY, METERED RESPIRATORY (INHALATION) at 06:46

## 2023-10-11 RX ADMIN — IPRATROPIUM BROMIDE AND ALBUTEROL SULFATE 3 ML: 2.5; .5 SOLUTION RESPIRATORY (INHALATION) at 19:41

## 2023-10-11 RX ADMIN — PIPERACILLIN SODIUM AND TAZOBACTAM SODIUM 4.5 G: 4; .5 INJECTION, SOLUTION INTRAVENOUS at 11:35

## 2023-10-11 RX ADMIN — POTASSIUM CHLORIDE 40 MEQ: 1.5 POWDER, FOR SOLUTION ORAL at 11:25

## 2023-10-11 RX ADMIN — ATORVASTATIN CALCIUM 80 MG: 80 TABLET, FILM COATED ORAL at 20:40

## 2023-10-11 RX ADMIN — POTASSIUM CHLORIDE 20 MEQ: 1.5 POWDER, FOR SOLUTION ORAL at 08:56

## 2023-10-11 RX ADMIN — PIPERACILLIN SODIUM AND TAZOBACTAM SODIUM 4.5 G: 4; .5 INJECTION, SOLUTION INTRAVENOUS at 23:27

## 2023-10-11 SDOH — ECONOMIC STABILITY: FOOD INSECURITY: WITHIN THE PAST 12 MONTHS, THE FOOD YOU BOUGHT JUST DIDN'T LAST AND YOU DIDN'T HAVE MONEY TO GET MORE.: NEVER TRUE

## 2023-10-11 SDOH — ECONOMIC STABILITY: INCOME INSECURITY: IN THE PAST 12 MONTHS, HAS THE ELECTRIC, GAS, OIL, OR WATER COMPANY THREATENED TO SHUT OFF SERVICE IN YOUR HOME?: NO

## 2023-10-11 SDOH — ECONOMIC STABILITY: INCOME INSECURITY: IN THE LAST 12 MONTHS, WAS THERE A TIME WHEN YOU WERE NOT ABLE TO PAY THE MORTGAGE OR RENT ON TIME?: NO

## 2023-10-11 SDOH — ECONOMIC STABILITY: INCOME INSECURITY: HOW HARD IS IT FOR YOU TO PAY FOR THE VERY BASICS LIKE FOOD, HOUSING, MEDICAL CARE, AND HEATING?: NOT HARD AT ALL

## 2023-10-11 SDOH — ECONOMIC STABILITY: FOOD INSECURITY: WITHIN THE PAST 12 MONTHS, YOU WORRIED THAT YOUR FOOD WOULD RUN OUT BEFORE YOU GOT MONEY TO BUY MORE.: NEVER TRUE

## 2023-10-11 ASSESSMENT — PAIN SCALES - GENERAL
PAINLEVEL_OUTOF10: 0 - NO PAIN
PAINLEVEL_OUTOF10: 3

## 2023-10-11 ASSESSMENT — COGNITIVE AND FUNCTIONAL STATUS - GENERAL
PERSONAL GROOMING: A LOT
TURNING FROM BACK TO SIDE WHILE IN FLAT BAD: A LITTLE
TOILETING: A LOT
MOVING TO AND FROM BED TO CHAIR: A LOT
CLIMB 3 TO 5 STEPS WITH RAILING: TOTAL
WALKING IN HOSPITAL ROOM: TOTAL
DRESSING REGULAR LOWER BODY CLOTHING: A LOT
CLIMB 3 TO 5 STEPS WITH RAILING: TOTAL
EATING MEALS: A LITTLE
DAILY ACTIVITIY SCORE: 13
EATING MEALS: A LITTLE
WALKING IN HOSPITAL ROOM: TOTAL
MOVING FROM LYING ON BACK TO SITTING ON SIDE OF FLAT BED WITH BEDRAILS: A LITTLE
TOILETING: A LOT
STANDING UP FROM CHAIR USING ARMS: A LOT
TURNING FROM BACK TO SIDE WHILE IN FLAT BAD: A LITTLE
STANDING UP FROM CHAIR USING ARMS: A LOT
DRESSING REGULAR UPPER BODY CLOTHING: A LITTLE
HELP NEEDED FOR BATHING: A LOT
DRESSING REGULAR UPPER BODY CLOTHING: A LOT
DAILY ACTIVITIY SCORE: 15
HELP NEEDED FOR BATHING: A LOT
PERSONAL GROOMING: A LITTLE
MOBILITY SCORE: 12
MOVING FROM LYING ON BACK TO SITTING ON SIDE OF FLAT BED WITH BEDRAILS: A LITTLE
DRESSING REGULAR LOWER BODY CLOTHING: A LOT
MOBILITY SCORE: 12
MOVING TO AND FROM BED TO CHAIR: A LOT

## 2023-10-11 ASSESSMENT — PAIN - FUNCTIONAL ASSESSMENT
PAIN_FUNCTIONAL_ASSESSMENT: 0-10
PAIN_FUNCTIONAL_ASSESSMENT: 0-10

## 2023-10-11 ASSESSMENT — ACTIVITIES OF DAILY LIVING (ADL): LACK_OF_TRANSPORTATION: NO

## 2023-10-11 NOTE — PROGRESS NOTES
Department of Medicine  Division of Pulmonary, Critical Care, and Sleep Medicine    Christie Arias is a 68 y.o. female on day 9 of admission presenting with Chronic obstructive pulmonary disease (CMS/HCC).    Subjective   Feeling better, on 2 L nasal cannula today, no acute events.       Objective     Vital Signs      10/10/2023     9:54 AM 10/10/2023     1:00 PM 10/10/2023     4:54 PM 10/10/2023     8:43 PM 10/11/2023     4:59 AM 10/11/2023     8:09 AM 10/11/2023    10:00 AM   Vitals   Systolic 93 94 115 95 141 114 118   Diastolic 68 63 87 76 110 78 85   Heart Rate 97 71 77 89 90  85   Temp 36.6 °C (97.9 °F) 36.7 °C (98.1 °F) 37 °C (98.6 °F) 36.5 °C (97.7 °F) 35.8 °C (96.4 °F)  37.5 °C (99.5 °F)   Resp 22 22 23 22 22  20   Weight (lb)     113.76     BMI     16.8 kg/m2     BSA (m2)     1.58 m2          Physical Exam  Constitutional:       Appearance: Normal appearance.   HENT:      Head: Normocephalic and atraumatic.      Nose: Nose normal.      Mouth/Throat:      Pharynx: Oropharynx is clear.   Eyes:      Pupils: Pupils are equal, round, and reactive to light.   Cardiovascular:      Rate and Rhythm: Normal rate and regular rhythm.   Pulmonary:      Effort: Pulmonary effort is normal.      Breath sounds: No wheezing or rhonchi.   Abdominal:      General: There is no distension.      Palpations: Abdomen is soft.      Tenderness: There is no abdominal tenderness.   Musculoskeletal:      Cervical back: Neck supple.      Right lower leg: No edema.      Left lower leg: No edema.   Neurological:      Mental Status: She is alert and oriented to person, place, and time.   Psychiatric:         Mood and Affect: Mood normal.         Behavior: Behavior normal.          Labs:  Lab Results   Component Value Date    WBC 12.6 (H) 10/11/2023    HGB 9.0 (L) 10/11/2023    HCT 29.7 (L) 10/11/2023    MCV 79 (L) 10/11/2023     10/11/2023      Lab Results   Component Value Date    GLUCOSE 87 10/11/2023    CALCIUM 8.0 (L)  10/11/2023     10/11/2023    K 3.1 (L) 10/11/2023    CO2 25 10/11/2023     10/11/2023    BUN 23 10/11/2023    CREATININE 1.09 (H) 10/11/2023      Lab Results   Component Value Date    ALT 18 10/02/2023    AST 27 10/02/2023    ALKPHOS 121 10/02/2023    BILITOT 0.5 10/02/2023        Oxygen Therapy  SpO2: 93 %  Oxygen Therapy: Supplemental oxygen  O2 Delivery Method: Nasal cannula  FiO2 (%):  [28 %] 28 %    Intake/Output last 3 Shifts:  I/O last 3 completed shifts:  In: 1595.5 (30.9 mL/kg) [P.O.:1080; I.V.:15.5 (0.3 mL/kg); IV Piggyback:500]  Out: 800 (15.5 mL/kg) [Urine:800 (0.4 mL/kg/hr)]  Weight: 51.6 kg       Medications   Scheduled medications  atorvastatin, 80 mg, oral, Nightly  enoxaparin, 30 mg, subcutaneous, q24h  fluticasone furoate-vilanteroL, 1 puff, inhalation, Daily  ipratropium-albuteroL, 3 mL, nebulization, TID  melatonin, 5 mg, oral, Nightly  mirtazapine, 15 mg, oral, q24h  pantoprazole, 40 mg, oral, Daily before breakfast  piperacillin-tazobactam, 4.5 g, intravenous, q6h  potassium chloride, 20 mEq, oral, BID  potassium chloride, 40 mEq, oral, Once  predniSONE, 40 mg, oral, Daily  QUEtiapine, 12.5 mg, oral, Nightly  sertraline, 25 mg, oral, Daily  tiotropium, 2 puff, inhalation, Daily      Continuous medications     PRN medications  PRN medications: acetaminophen **OR** [DISCONTINUED] acetaminophen **OR** [DISCONTINUED] acetaminophen, docusate sodium, guaiFENesin, ipratropium-albuteroL, ondansetron ODT **OR** [DISCONTINUED] ondansetron, oxygen, polyethylene glycol     Allergies  Codeine    Chest Radiograph   CT angio chest for pulmonary embolism 10/02/2023    Narrative  STUDY:  CT Angiogram of the Chest; 10/2/2023 at 2:14 a.m.  INDICATION:  Pulmonary embolism evaluation.  COMPARISON:  One-view CXR 10/1/2023.  CTA CAP 8/15/2023.  ACCESSION NUMBER(S):  [id number]  ORDERING CLINICIAN:  AZUL TERESA  TECHNIQUE:  CTA of the chest was performed with intravenous contrast.  Images are reviewed  and processed at a workstation according to the CT  angiogram protocol with 3-D and/or MIP post processing imaging  generated.  Omnipaque 350 100 mL was administered intravenously.  Automated mA/kV exposure control was utilized and patient examination  was performed in strict accordance with principles of ALARA.  FINDINGS:  Pulmonary arteries are adequately opacified without acute or chronic  filling defects.  Distal thoracic aorta measures 4.3 cm unchanged.  The heart is normal in size without pericardial effusion.  Thoracic  lymph nodes are not enlarged.  There is no pleural effusion, pleural thickening, or pneumothorax.  Improved left lower lobe mucous plugging with residual left basilar  atelectasis.  No evidence of central endobronchial lesion.  Severe  emphysema.  Small amount of right basilar atelectasis.  Lungs are  clear without consolidation, interstitial disease, or suspicious  nodules.  Upper abdomen demonstrates no acute pathology.  There are no acute fractures.  No suspicious bony lesions.    Impression  No acute pulmonary embolism  Distal thoracic aortic aneurysm unchanged measuring 4.3 cm  Improved left lower lobe mucous plugging and postobstructive  atelectasis since the prior.  Signed by Son Singh MD       XR chest 1 view 10/11/2023    Narrative  Interpreted By:  Mark Walters,  STUDY:  XR CHEST 1 VIEW;  10/11/2023 3:52 am    INDICATION:  Signs/Symptoms:increased O2 demand.    COMPARISON:  None.    ACCESSION NUMBER(S):  [id number]    ORDERING CLINICIAN:  EZIO DAVILA    FINDINGS:  There is stable cardiomegaly. There is pulmonary vascular congestive  change and edema. There are pleural effusions and bilateral airspace  disease. There is pulmonary emphysema. No pneumothorax.    Impression  Cardiomegaly with vascular congestive change and pleural effusions  and bilateral airspace disease which is increased in comparison to  prior examination.    MACRO:  None    Signed by: Mark Walters  10/11/2023 4:05 AM  Dictation workstation:   [id number]      XR chest 1 view 10/10/2023    Narrative  Interpreted By:  Valentín Sanders,  STUDY:  XR CHEST 1 VIEW;  10/10/2023 1:45 pm    INDICATION:  Signs/Symptoms:S/P LEFT THORACENTESIS.    COMPARISON:  10/09/2023 and 10/08/2023 portable AP chest x-rays and the 10/02/2023  thoracic CTA.    ACCESSION NUMBER(S):  [id number]    ORDERING CLINICIAN:  REN MCLEOD    FINDINGS:  Portable AP upright chest x-ray 10/10/2023:        CARDIOMEDIASTINAL SILHOUETTE:  Cardiomediastinal silhouette is normal in size and configuration  allowing for the AP lordotic projection. Extensive vascular  calcification including coronary artery calcification is noted. A  thoracoabdominal junction aortic aneurysm demonstrated on CT is  difficult to appreciate.    LUNGS:  Marked improvement in the dense opacification of the mid and lower  left chest present yesterday. Dense left inferomedial thoracic  opacity has a sharp lateral margin suggesting lower lobe collapse and  consolidation, but a left pleural effusion can not be ruled out.  Lateral costophrenic angle blunting and basilar haziness on the right  suggest a small pleural effusion. Emphysema with pulmonary  hyperinflation as reported on CT.    ABDOMEN:  No remarkable upper abdominal findings.    BONES:  No acute osseous changes. Osteoporosis and a T7 vertebral body  compression fracture are noted.    Impression  1. Much better aeration of the left lung than yesterday. Left  retrocardiac opacity suggests collapse and dense consolidation of the  left lower lobe.  2. Small right and perhaps left pleural effusions.  3. Emphysema.  4. Marked coronary artery calcification.        MACRO:  None    Signed by: Valentín Sanders 10/10/2023 2:24 PM  Dictation workstation:   [id number]      XR chest 1 view 10/09/2023    Narrative  Interpreted By:  Ivory Abdalla,  STUDY:  XR CHEST 1 VIEW;  10/9/2023 4:04 pm    INDICATION:  Signs/Symptoms:Lung  collapse post bronch.    COMPARISON:  10/08/2023    ACCESSION NUMBER(S):  [id number]    ORDERING CLINICIAN:  DAX PAT    FINDINGS:  Left heart border is obscured. There appears to be loss of volume on  the left.    There is bilateral parenchymal infiltration. There are bilateral  pleural effusions.    There are dense aortic calcifications.    Bones are osteoporotic. The patient is scoliotic.    COMPARISON OF FINDING:  There is improved aeration on the left.    Impression  Bilateral parenchymal infiltration. Bilateral pleural effusions.  Slight improvement of aeration on the left.    MACRO:  none    Signed by: Ivory Abdalla 10/9/2023 4:19 PM  Dictation workstation:   [id number]         Assessment and Plan / Recommendations   Assessment/Plan   68-year-old female with history of hypertension, CVA, COPD admitted with acute hypoxic respiratory failure     1.  Acute hypoxic respiratory failure due to COPD exacerbation, pneumonia, mucous plugging and atelectasis  2.  COPD exacerbation  3.  pneumonia/mucous plugging and atelectasis     post bronchoscopy 10/09 with suctioning of copious mucous plugscausing complete obstruction of left bronchial tree.  Thoracentesis 10/10, 980 cc of clear yellow fluid drained.  Post Thora chest x-ray with significant improvement in left lung collapse.  Fluid analysis consistent with transudate, follow-up cytology and cultures.     -Continue prednisone 40 mg daily, taper over a week  -Continue nebs, Breo  -continue bronchial hygiene with Incentive spirometry, Acapella, vest, wean O2 as tolerated, on 2 L nasal cannula today  -Continue Zosyn 7 days course for pneumonia    We will sign off, please contact us with any questions.       Dax Pat MD

## 2023-10-11 NOTE — PROGRESS NOTES
Christie Arias is a 68 y.o. female on day 9 of admission presenting with Chronic obstructive pulmonary disease (CMS/HCC).      Subjective   Overnight, the patient had acutely worsening O2 status, requiring non-rebreather. Patient's HR was found to be in the 140s. Immediate CXR taken at that time showed worsening pulmonary vascular congestion, edema, and pleural effusion as well. Patient was given a dose of IV Lasix 20 mg with improvement in symptoms. This morning, the patient was able to rest comfortably on 2L O2, and denies any worsening of breathing status since then.       Objective     Last Recorded Vitals  /85 (BP Location: Left arm, Patient Position: Sitting)   Pulse 85   Temp 37.5 °C (99.5 °F) (Temporal)   Resp 20   Wt 51.6 kg (113 lb 12.1 oz)   SpO2 93%   Intake/Output last 3 Shifts:    Intake/Output Summary (Last 24 hours) at 10/11/2023 1326  Last data filed at 10/11/2023 1320  Gross per 24 hour   Intake 1045 ml   Output 2750 ml   Net -1705 ml       Admission Weight  Weight: 52.4 kg (115 lb 8.3 oz) (10/01/23 2306)    Daily Weight  10/11/23 : 51.6 kg (113 lb 12.1 oz)    Image Results  XR chest 1 view  Narrative: Interpreted By:  Mark Walters,   STUDY:  XR CHEST 1 VIEW;  10/11/2023 3:52 am      INDICATION:  Signs/Symptoms:increased O2 demand.      COMPARISON:  None.      ACCESSION NUMBER(S):  [id number]      ORDERING CLINICIAN:  EZIO DAVILA      FINDINGS:  There is stable cardiomegaly. There is pulmonary vascular congestive  change and edema. There are pleural effusions and bilateral airspace  disease. There is pulmonary emphysema. No pneumothorax.      Impression: Cardiomegaly with vascular congestive change and pleural effusions  and bilateral airspace disease which is increased in comparison to  prior examination.      MACRO:  None      Signed by: Mark Walters 10/11/2023 4:05 AM  Dictation workstation:   [id number]      Physical Exam  Vitals reviewed.   Constitutional:       General:  She is not in acute distress.     Appearance: Normal appearance. She is underweight.   Cardiovascular:      Rate and Rhythm: Normal rate and regular rhythm.      Pulses: Normal pulses.      Heart sounds: Normal heart sounds. No murmur heard.  Pulmonary:      Effort: No respiratory distress.      Breath sounds: No wheezing.      Comments: Crackles heard bilaterally  Chest:      Chest wall: No tenderness.   Abdominal:      General: Abdomen is flat. Bowel sounds are normal. There is no distension.      Palpations: Abdomen is soft.      Tenderness: There is no abdominal tenderness.   Musculoskeletal:      Right lower leg: No edema.      Left lower leg: No edema.   Neurological:      General: No focal deficit present.      Mental Status: She is alert and oriented to person, place, and time. Mental status is at baseline.         Relevant Results  Scheduled medications  atorvastatin, 80 mg, oral, Nightly  enoxaparin, 30 mg, subcutaneous, q24h  fluticasone furoate-vilanteroL, 1 puff, inhalation, Daily  ipratropium-albuteroL, 3 mL, nebulization, TID  melatonin, 5 mg, oral, Nightly  mirtazapine, 15 mg, oral, q24h  pantoprazole, 40 mg, oral, Daily before breakfast  piperacillin-tazobactam, 4.5 g, intravenous, q6h  potassium chloride, 20 mEq, oral, BID  predniSONE, 40 mg, oral, Daily  QUEtiapine, 12.5 mg, oral, Nightly  sertraline, 25 mg, oral, Daily  tiotropium, 2 puff, inhalation, Daily      Continuous medications     PRN medications  PRN medications: acetaminophen **OR** [DISCONTINUED] acetaminophen **OR** [DISCONTINUED] acetaminophen, docusate sodium, guaiFENesin, ipratropium-albuteroL, ondansetron ODT **OR** [DISCONTINUED] ondansetron, oxygen, polyethylene glycol    Results for orders placed or performed during the hospital encounter of 10/01/23 (from the past 24 hour(s))   Blood Gas Venous Unsolicited   Result Value Ref Range    POCT pH, Venous 7.37 7.33 - 7.43 pH    POCT pCO2, Venous 42 41 - 51 mm Hg    POCT pO2, Venous  84 (H) 35 - 45 mm Hg    POCT SO2, Venous 97 (H) 45 - 75 %    POCT Oxy Hemoglobin, Venous 95.5 (H) 45.0 - 75.0 %    POCT Base Excess, Venous -1.1 -2.0 - 3.0 mmol/L    POCT HCO3 Calculated, Venous 24.3 22.0 - 26.0 mmol/L    Patient Temperature 37.0 degrees Celsius    Apparatus CANNULA     Flow 6.0 LPM    Test Comment G 2W GEM    CBC   Result Value Ref Range    WBC 12.6 (H) 4.4 - 11.3 x10*3/uL    nRBC 0.0 0.0 - 0.0 /100 WBCs    RBC 3.78 (L) 4.00 - 5.20 x10*6/uL    Hemoglobin 9.0 (L) 12.0 - 16.0 g/dL    Hematocrit 29.7 (L) 36.0 - 46.0 %    MCV 79 (L) 80 - 100 fL    MCH 23.8 (L) 26.0 - 34.0 pg    MCHC 30.3 (L) 32.0 - 36.0 g/dL    RDW 23.1 (H) 11.5 - 14.5 %    Platelets 360 150 - 450 x10*3/uL    MPV 10.9 7.5 - 11.5 fL   Renal Function Panel   Result Value Ref Range    Glucose 87 74 - 99 mg/dL    Sodium 138 136 - 145 mmol/L    Potassium 3.1 (L) 3.5 - 5.3 mmol/L    Chloride 100 98 - 107 mmol/L    Bicarbonate 25 21 - 32 mmol/L    Anion Gap 16 10 - 20 mmol/L    Urea Nitrogen 23 6 - 23 mg/dL    Creatinine 1.09 (H) 0.50 - 1.05 mg/dL    eGFR 55 (L) >60 mL/min/1.73m*2    Calcium 8.0 (L) 8.6 - 10.3 mg/dL    Phosphorus 4.0 2.5 - 4.9 mg/dL    Albumin 3.3 (L) 3.4 - 5.0 g/dL   Magnesium   Result Value Ref Range    Magnesium 1.65 1.60 - 2.40 mg/dL         Assessment/Plan     Christie Arias is a 68 y.o. 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 recently started on antibiotic Zosyn (10/7-).       Acute Issues:   #Acute hypoxic and hypercapneic respiratory failure, likely 2/2 COPD exacerbation  #LLL mucous plugging and atelectasis.  #Left pleural effusion  #Pulmonary edema  - Overnight, the patient had an episode of likely flash pulmonary edema which was resolved by a dose of IV Lasix 20 mg.  - Given another dose of IV Lasix 20 mg this morning.  - Pulmonology  signed off, recommending to taper the prednisone for a week, continuing her inhalers, IS, Acapella, vest and to finish a weeklong course of Zosyn (10/7 - 10/13)  - Wean O2 as tolerated    #Hypokalemia   - This AM, K+ 3.1, given one time dose of K-Cammie 40 mEq  - Continue 20 mEq K-Cammie packets BID     #Acute on chronic anemia likely 2/2 JIE, stable  - Cont IV protonix 40mg daily  - Hold PO iron supplementation per GI recs as it may produce black stools  - Plan for OP heme onc follow-up after discharge.      #Failure to thrive  - consult nutrition, PT/OT following.      Chronic Issues:   #HTN: Holding home amlodipine 10 mg due to lower BP, can consider restarting at a lower dose prior to discharge  #CVA: Continue atorvastatin 80 mg  #MDD: cont zoloft and remeron  #Anxiety: Seroquel 12.5mg at bedtime     F: None  E: Replete as needed  N: Regular diet with magic cup supplementation  G: Protonix  VTE: Lovenox     Code status: DNR and No Intubation   Disposition: 68 y.o. female who was admitted for acute hypoxic respiratory failure 2/2 COPD exacerbation. Had an episode of likely flash pulmonary edema which was resolved with IV Lasix 20 mg. Given another dose this morning. Pulm signed off, recommending steroid taper over a week and to finish a weeklong course of Zosyn. Wean O2 as tolerated    Kita Lr MD

## 2023-10-11 NOTE — PROGRESS NOTES
10/12/23 @ 1525: Patient medically ready to discharge today back to [place]. Orders complete. Facility willing to accept. Not a precert. Transport confirmed for 1730 pickup. Patient, son ( Jason), RN, and facility notified of discharge time. Number for nurse to nurse report provided to SG Maradiaga. Patient discharging on 1 liter 02.    10/11/23 1306   Discharge Planning   Living Arrangements Other (Comment)  ([facility])   Support Systems Other (Comment)   Assistance Needed LTC at [place] requiring assistance for transfers , room air baseline   Type of Residence Nursing home/residential care   Care Facility Name [facility]   Home or Post Acute Services Post acute facilities (Rehab/SNF/etc)   Type of Post Acute Facility Services Long term care   Patient expects to be discharged to: [facility], referral sent, no precert, not medically ready due to IV antibiotics, 2 liters 02, steroids   Does the patient need discharge transport arranged? Yes   RoundTrip coordination needed? Yes   Has discharge transport been arranged? No   Financial Resource Strain   How hard is it for you to pay for the very basics like food, housing, medical care, and heating? Not hard   Housing Stability   In the last 12 months, was there a time when you were not able to pay the mortgage or rent on time? N   In the last 12 months, how many places have you lived? 1   In the last 12 months, was there a time when you did not have a steady place to sleep or slept in a shelter (including now)? N   Transportation Needs   In the past 12 months, has lack of transportation kept you from medical appointments or from getting medications? no   In the past 12 months, has lack of transportation kept you from meetings, work, or from getting things needed for daily living? No   Patient Choice   Patient / Family choosing to utilize agency / facility established prior to hospitalization Yes

## 2023-10-11 NOTE — CARE PLAN
The patient's goals for the shift include keep O2 sat 92% and above    The clinical goals for the shift include wean o2 by end of shift    Over the shift, the patient did not make progress toward the following goals. Barriers to progression include effective cough effort and anxiety. Recommendations to address these barriers include encourage deep breathing and cough.    Problem: Respiratory  Goal: Minimize anxiety/maximize coping throughout shift  Outcome: Not Progressing  Goal: Minimal/no exertional discomfort or dyspnea this shift  Outcome: Not Progressing  Goal: No signs of respiratory distress (eg. Use of accessory muscles. Peds grunting)  Outcome: Not Progressing  Goal: Verbalize decreased shortness of breath this shift  Outcome: Not Progressing  Goal: Wean oxygen to maintain O2 saturation per order/standard this shift  Outcome: Not Progressing  Goal: Increase self care and/or family involvement in next 24 hours  Outcome: Not Progressing     0320 pt voiced sob, noted increased wob, tachypnea, audible rhonchi, heart rate noted 130s dr porter notified, n.o for cxr, vbg, and one time dose of lasix ivp.  Pt maintained pulses ox % on 6L

## 2023-10-12 ENCOUNTER — PHARMACY VISIT (OUTPATIENT)
Dept: PHARMACY | Facility: CLINIC | Age: 68
End: 2023-10-12

## 2023-10-12 VITALS
BODY MASS INDEX: 15.93 KG/M2 | OXYGEN SATURATION: 96 % | TEMPERATURE: 97.5 F | DIASTOLIC BLOOD PRESSURE: 88 MMHG | WEIGHT: 107.58 LBS | RESPIRATION RATE: 22 BRPM | SYSTOLIC BLOOD PRESSURE: 129 MMHG | HEIGHT: 69 IN | HEART RATE: 81 BPM

## 2023-10-12 PROBLEM — J18.9 PNEUMONIA: Status: RESOLVED | Noted: 2023-10-09 | Resolved: 2023-10-12

## 2023-10-12 PROBLEM — Z09 HOSPITAL DISCHARGE FOLLOW-UP: Status: ACTIVE | Noted: 2023-10-12

## 2023-10-12 LAB
ALBUMIN SERPL BCP-MCNC: 3.3 G/DL (ref 3.4–5)
ALP SERPL-CCNC: 72 U/L (ref 33–136)
ALT SERPL W P-5'-P-CCNC: 25 U/L (ref 7–45)
ANION GAP SERPL CALC-SCNC: 16 MMOL/L (ref 10–20)
AST SERPL W P-5'-P-CCNC: 15 U/L (ref 9–39)
BILIRUB SERPL-MCNC: 0.4 MG/DL (ref 0–1.2)
BUN SERPL-MCNC: 17 MG/DL (ref 6–23)
CALCIUM SERPL-MCNC: 8.2 MG/DL (ref 8.6–10.3)
CHLORIDE SERPL-SCNC: 98 MMOL/L (ref 98–107)
CO2 SERPL-SCNC: 25 MMOL/L (ref 21–32)
CREAT SERPL-MCNC: 0.95 MG/DL (ref 0.5–1.05)
ERYTHROCYTE [DISTWIDTH] IN BLOOD BY AUTOMATED COUNT: 22.8 % (ref 11.5–14.5)
FUNGUS SPEC CULT: ABNORMAL
FUNGUS SPEC FUNGUS STN: ABNORMAL
GFR SERPL CREATININE-BSD FRML MDRD: 65 ML/MIN/1.73M*2
GLUCOSE SERPL-MCNC: 72 MG/DL (ref 74–99)
HCT VFR BLD AUTO: 31.9 % (ref 36–46)
HGB BLD-MCNC: 9.6 G/DL (ref 12–16)
MAGNESIUM SERPL-MCNC: 1.61 MG/DL (ref 1.6–2.4)
MCH RBC QN AUTO: 23.6 PG (ref 26–34)
MCHC RBC AUTO-ENTMCNC: 30.1 G/DL (ref 32–36)
MCV RBC AUTO: 78 FL (ref 80–100)
NRBC BLD-RTO: 0 /100 WBCS (ref 0–0)
PHOSPHATE SERPL-MCNC: 3.6 MG/DL (ref 2.5–4.9)
PLATELET # BLD AUTO: 412 X10*3/UL (ref 150–450)
PMV BLD AUTO: 10.8 FL (ref 7.5–11.5)
POTASSIUM SERPL-SCNC: 3 MMOL/L (ref 3.5–5.3)
PROT SERPL-MCNC: 5.9 G/DL (ref 6.4–8.2)
RBC # BLD AUTO: 4.07 X10*6/UL (ref 4–5.2)
SODIUM SERPL-SCNC: 136 MMOL/L (ref 136–145)
WBC # BLD AUTO: 9.9 X10*3/UL (ref 4.4–11.3)

## 2023-10-12 PROCEDURE — 2500000002 HC RX 250 W HCPCS SELF ADMINISTERED DRUGS (ALT 637 FOR MEDICARE OP, ALT 636 FOR OP/ED): Performed by: INTERNAL MEDICINE

## 2023-10-12 PROCEDURE — 2500000004 HC RX 250 GENERAL PHARMACY W/ HCPCS (ALT 636 FOR OP/ED)

## 2023-10-12 PROCEDURE — 2500000001 HC RX 250 WO HCPCS SELF ADMINISTERED DRUGS (ALT 637 FOR MEDICARE OP)

## 2023-10-12 PROCEDURE — 85027 COMPLETE CBC AUTOMATED: CPT

## 2023-10-12 PROCEDURE — 84100 ASSAY OF PHOSPHORUS: CPT

## 2023-10-12 PROCEDURE — 97530 THERAPEUTIC ACTIVITIES: CPT | Mod: GP,CQ

## 2023-10-12 PROCEDURE — 97110 THERAPEUTIC EXERCISES: CPT | Mod: GP,CQ

## 2023-10-12 PROCEDURE — 36415 COLL VENOUS BLD VENIPUNCTURE: CPT

## 2023-10-12 PROCEDURE — 99239 HOSP IP/OBS DSCHRG MGMT >30: CPT

## 2023-10-12 PROCEDURE — 94640 AIRWAY INHALATION TREATMENT: CPT

## 2023-10-12 PROCEDURE — 96372 THER/PROPH/DIAG INJ SC/IM: CPT

## 2023-10-12 PROCEDURE — 2500000004 HC RX 250 GENERAL PHARMACY W/ HCPCS (ALT 636 FOR OP/ED): Performed by: INTERNAL MEDICINE

## 2023-10-12 PROCEDURE — 94668 MNPJ CHEST WALL SBSQ: CPT

## 2023-10-12 PROCEDURE — 80053 COMPREHEN METABOLIC PANEL: CPT

## 2023-10-12 PROCEDURE — 83735 ASSAY OF MAGNESIUM: CPT

## 2023-10-12 RX ORDER — METHYLPREDNISOLONE 4 MG/1
TABLET ORAL
Qty: 21 TABLET | Refills: 0 | Status: SHIPPED | OUTPATIENT
Start: 2023-10-12 | End: 2023-10-19

## 2023-10-12 RX ORDER — QUETIAPINE FUMARATE 25 MG/1
12.5 TABLET, FILM COATED ORAL NIGHTLY
Qty: 15 TABLET | Refills: 0 | Status: SHIPPED | OUTPATIENT
Start: 2023-10-12 | End: 2023-11-11

## 2023-10-12 RX ORDER — IPRATROPIUM BROMIDE AND ALBUTEROL SULFATE 2.5; .5 MG/3ML; MG/3ML
3 SOLUTION RESPIRATORY (INHALATION)
Qty: 270 ML | Refills: 0 | Status: SHIPPED | OUTPATIENT
Start: 2023-10-12 | End: 2023-11-11

## 2023-10-12 RX ORDER — IPRATROPIUM BROMIDE AND ALBUTEROL SULFATE 2.5; .5 MG/3ML; MG/3ML
3 SOLUTION RESPIRATORY (INHALATION) EVERY 2 HOUR PRN
Qty: 180 ML | Refills: 11 | Status: SHIPPED | OUTPATIENT
Start: 2023-10-12

## 2023-10-12 RX ORDER — MAGNESIUM SULFATE HEPTAHYDRATE 40 MG/ML
2 INJECTION, SOLUTION INTRAVENOUS ONCE
Status: COMPLETED | OUTPATIENT
Start: 2023-10-12 | End: 2023-10-12

## 2023-10-12 RX ORDER — POTASSIUM CHLORIDE 20 MEQ/1
40 TABLET, EXTENDED RELEASE ORAL 2 TIMES DAILY
Status: DISCONTINUED | OUTPATIENT
Start: 2023-10-12 | End: 2023-10-12 | Stop reason: HOSPADM

## 2023-10-12 RX ORDER — ACETAMINOPHEN 500 MG
5 TABLET ORAL NIGHTLY
Qty: 30 TABLET | Refills: 0 | Status: SHIPPED | OUTPATIENT
Start: 2023-10-12 | End: 2023-11-11

## 2023-10-12 RX ADMIN — PIPERACILLIN SODIUM AND TAZOBACTAM SODIUM 4.5 G: 4; .5 INJECTION, SOLUTION INTRAVENOUS at 05:30

## 2023-10-12 RX ADMIN — PANTOPRAZOLE SODIUM 40 MG: 40 TABLET, DELAYED RELEASE ORAL at 06:30

## 2023-10-12 RX ADMIN — SERTRALINE HYDROCHLORIDE 25 MG: 50 TABLET ORAL at 08:16

## 2023-10-12 RX ADMIN — Medication 2 L/MIN: at 09:21

## 2023-10-12 RX ADMIN — TIOTROPIUM BROMIDE INHALATION SPRAY 2 PUFF: 3.12 SPRAY, METERED RESPIRATORY (INHALATION) at 06:30

## 2023-10-12 RX ADMIN — ENOXAPARIN SODIUM 30 MG: 30 INJECTION SUBCUTANEOUS at 13:17

## 2023-10-12 RX ADMIN — POTASSIUM CHLORIDE 20 MEQ: 1.5 POWDER, FOR SOLUTION ORAL at 08:16

## 2023-10-12 RX ADMIN — IPRATROPIUM BROMIDE AND ALBUTEROL SULFATE 3 ML: 2.5; .5 SOLUTION RESPIRATORY (INHALATION) at 09:21

## 2023-10-12 RX ADMIN — POTASSIUM CHLORIDE 40 MEQ: 1500 TABLET, EXTENDED RELEASE ORAL at 08:16

## 2023-10-12 RX ADMIN — PIPERACILLIN SODIUM AND TAZOBACTAM SODIUM 4.5 G: 4; .5 INJECTION, SOLUTION INTRAVENOUS at 11:08

## 2023-10-12 RX ADMIN — FLUTICASONE FUROATE AND VILANTEROL TRIFENATATE 1 PUFF: 100; 25 POWDER RESPIRATORY (INHALATION) at 08:15

## 2023-10-12 RX ADMIN — PREDNISONE 40 MG: 20 TABLET ORAL at 08:16

## 2023-10-12 RX ADMIN — MAGNESIUM SULFATE HEPTAHYDRATE 2 G: 40 INJECTION, SOLUTION INTRAVENOUS at 08:15

## 2023-10-12 ASSESSMENT — COGNITIVE AND FUNCTIONAL STATUS - GENERAL
MOVING TO AND FROM BED TO CHAIR: TOTAL
WALKING IN HOSPITAL ROOM: TOTAL
STANDING UP FROM CHAIR USING ARMS: TOTAL
CLIMB 3 TO 5 STEPS WITH RAILING: TOTAL
MOBILITY SCORE: 8
MOVING FROM LYING ON BACK TO SITTING ON SIDE OF FLAT BED WITH BEDRAILS: A LOT
TURNING FROM BACK TO SIDE WHILE IN FLAT BAD: A LOT

## 2023-10-12 ASSESSMENT — PAIN SCALES - GENERAL: PAINLEVEL_OUTOF10: 0 - NO PAIN

## 2023-10-12 NOTE — CARE PLAN
The patient's goals for the shift include keep O2 sat 92% and above    The clinical goals for the shift include patient will remain HDS throughout shift.    Over the shift, the patient did not make progress toward the following goals. Barriers to progression include ***. Recommendations to address these barriers include ***.

## 2023-10-12 NOTE — PROGRESS NOTES
Christie Arias is a 68 y.o. female on day 10 of admission presenting with Chronic obstructive pulmonary disease (CMS/HCC).    Subjective   Interval History: no fever, no new complaints         Review of Systems    Objective   Range of Vitals (last 24 hours)  Heart Rate:  [67-87]   Temp:  [36.2 °C (97.2 °F)-36.8 °C (98.2 °F)]   Resp:  [18-23]   BP: ()/()   Weight:  [48.8 kg (107 lb 9.4 oz)]   SpO2:  [93 %-98 %]   Daily Weight  10/12/23 : 48.8 kg (107 lb 9.4 oz)    Body mass index is 15.89 kg/m².    Physical Exam  Constitutional:       Appearance: Normal appearance.   HENT:      Head: Normocephalic and atraumatic.      Mouth/Throat:      Mouth: Mucous membranes are moist.      Pharynx: Oropharynx is clear.   Eyes:      Pupils: Pupils are equal, round, and reactive to light.   Cardiovascular:      Rate and Rhythm: Normal rate and regular rhythm.      Heart sounds: Normal heart sounds.   Pulmonary:      Effort: Pulmonary effort is normal.      Breath sounds: Normal breath sounds.   Abdominal:      General: Abdomen is flat. Bowel sounds are normal.      Palpations: Abdomen is soft.   Musculoskeletal:      Cervical back: Normal range of motion.   Neurological:      Mental Status: She is alert.         Antibiotics  sodium chloride 0.9 % bolus 500 mL  methylPREDNISolone sod succinate (PF) (SOLU-Medrol) injection 125 mg  piperacillin-tazobactam-dextrose (Zosyn) IV 4.5 g  vancomycin in dextrose 5 % (Vancocin) IVPB 1 g  oxygen (O2) therapy  sodium chloride 0.9 % bolus 1,572 mL  iohexol (OMNIPaque) 350 mg iodine/mL injection 100 mL  oxygen (O2) therapy  ondansetron ODT (Zofran-ODT) disintegrating tablet 4 mg  ondansetron (Zofran) injection 4 mg  acetaminophen (Tylenol) tablet 650 mg  acetaminophen (Tylenol) oral liquid 650 mg  acetaminophen (Tylenol) suppository 650 mg  pantoprazole (ProtoNix) injection 80 mg  pantoprazole (ProtoNix) injection 40 mg  ipratropium-albuteroL (Duo-Neb) 0.5-2.5 mg/3 mL nebulizer  solution 3 mL  piperacillin-tazobactam-dextrose (Zosyn) IV 3.375 g  predniSONE (Deltasone) tablet 40 mg  vancomycin in dextrose 5 % (Vancocin) IVPB 500 mg  ipratropium-albuteroL (Duo-Neb) 0.5-2.5 mg/3 mL nebulizer solution 3 mL  ipratropium-albuteroL (Duo-Neb) 0.5-2.5 mg/3 mL nebulizer solution 3 mL  ipratropium-albuteroL (Duo-Neb) 0.5-2.5 mg/3 mL nebulizer solution 3 mL  tiotropium (Spiriva Respimat) 2.5 mcg/actuation inhaler 2 puff  fluticasone furoate-vilanteroL (Breo Ellipta) 100-25 mcg/dose inhaler 1 puff  amLODIPine (Norvasc) tablet  aspirin EC tablet      traMADol (Ultram) tablet  pantoprazole (ProtoNix) EC tablet  mirtazapine (Remeron) tablet      nitroglycerin (Nitrostat) SL tablet  potassium chloride SA (Klor-Con M20) ER tablet  sertraline (Zoloft) tablet  acetaminophen (Tylenol) tablet  ondansetron (Zofran) tablet  acetaminophen (Tylenol) tablet 325 mg  amLODIPine (Norvasc) tablet 10 mg  atorvastatin (Lipitor) tablet 80 mg  guaiFENesin (Robitussin) 100 mg/5 mL syrup 100 mg  mirtazapine (Remeron) tablet 15 mg  sertraline (Zoloft) tablet 25 mg  potassium chloride IV 20 mEq  oxygen (O2) therapy  ipratropium-albuteroL (Duo-Neb) 0.5-2.5 mg/3 mL nebulizer solution 3 mL  ferrous sulfate 325 (65 Fe) MG tablet 65 mg of iron  oxygen (O2) therapy  iron sucrose (Venofer) 200 mg in sodium chloride 0.9% 100 mL IVPB  melatonin tablet 5 mg  ipratropium-albuteroL (Duo-Neb) 0.5-2.5 mg/3 mL nebulizer solution 3 mL  enoxaparin (Lovenox) syringe 50 mg  piperacillin-tazobactam-dextrose (Zosyn) IV 4.5 g  enoxaparin (Lovenox) syringe 30 mg  piperacillin-tazobactam-dextrose (Zosyn) IV 3.375 g  vancomycin in dextrose 5 % (Vancocin) IVPB 1,000 mg  enoxaparin (Lovenox) syringe 30 mg  ipratropium-albuteroL (Duo-Neb) 0.5-2.5 mg/3 mL nebulizer solution 3 mL  OLANZapine (ZyPREXA) injection 5 mg  potassium chloride IV 20 mEq  potassium chloride (Klor-Con) packet 20 mEq  metoprolol tartrate (Lopressor) 5 mg/5 mL injection  - Omnicell  Override Pull  metoprolol tartrate (Lopressor) injection 5 mg  oxygen (O2) therapy  polyethylene glycol (Glycolax, Miralax) packet 17 g  docusate sodium (Colace) capsule 100 mg  potassium chloride IV 20 mEq  magnesium oxide (Mag-Ox) tablet 400 mg  OLANZapine (ZyPREXA) injection 5 mg  QUEtiapine (SEROquel) tablet 12.5 mg  pantoprazole (ProtoNix) EC tablet 40 mg  oxygen (O2) therapy  ipratropium-albuteroL (Duo-Neb) 0.5-2.5 mg/3 mL nebulizer solution 3 mL  oxygen (O2) therapy  oxygen (O2) therapy  potassium chloride IV 20 mEq  predniSONE (Deltasone) tablet 40 mg  OLANZapine (ZyPREXA) injection 5 mg  labetaloL (Normodyne,Trandate) injection 10 mg  oxygen (O2) therapy  magnesium sulfate IV 2 g  potassium chloride (Klor-Con) packet 20 mEq  piperacillin-tazobactam-dextrose (Zosyn) IV 4.5 g  potassium chloride IVPB 20 mEq  oxygen (O2) therapy  oxygen (O2) therapy  propofol (Diprivan) 10 mg/mL injection  - Omnicell Override Pull  lidocaine (cardiac) (Xylocaine) 100 mg/5 mL (2 %) injection  - Omnicell Override Pull  dexAMETHasone (Decadron) 4 mg/mL injection  - Omnicell Override Pull  rocuronium (ZeMuron) 10 mg/mL injection  - Omnicell Override Pull  succinylcholine (Anectine) 20 mg/mL injection  - Omnicell Override Pull  ondansetron (Zofran) 4 mg/2 mL injection  - Omnicell Override Pull  ondansetron (Zofran) 4 mg/2 mL injection  - Omnicell Override Pull  phenylephrine HCl in 0.9% NaCl 0.4 mg/10 mL (40 mcg/mL) syringe  - Omnicell Override Pull  phenylephrine HCl in 0.9% NaCl 0.4 mg/10 mL (40 mcg/mL) syringe  - Omnicell Override Pull  albumin human 5 % infusion 12.5 g  albumin human 5 % infusion  - Omnicell Override Pull  potassium chloride CR (Klor-Con M20) ER tablet 40 mEq  potassium chloride CR (Klor-Con M20) ER tablet 20 mEq  potassium chloride (Klor-Con) packet 40 mEq  oxygen (O2) therapy  oxygen (O2) therapy  furosemide (Lasix) injection 20 mg  potassium chloride (Klor-Con) packet 40 mEq  furosemide (Lasix) injection 20  mg  piperacillin-tazobactam-dextrose (Zosyn) IV 4.5 g  potassium chloride CR (Klor-Con M20) ER tablet 40 mEq  magnesium sulfate IV 2 g      Relevant Results  Labs  Results from last 72 hours   Lab Units 10/12/23  0617 10/11/23  0535 10/10/23  0502   WBC AUTO x10*3/uL 9.9 12.6* 9.7   HEMOGLOBIN g/dL 9.6* 9.0* 8.2*   HEMATOCRIT % 31.9* 29.7* 27.1*   PLATELETS AUTO x10*3/uL 412 360 336     Results from last 72 hours   Lab Units 10/12/23  0617 10/11/23  0535 10/10/23  0502   SODIUM mmol/L 136 138 140   POTASSIUM mmol/L 3.0* 3.1* 3.0*   CHLORIDE mmol/L 98 100 102   CO2 mmol/L 25 25 26   BUN mg/dL 17 23 20   CREATININE mg/dL 0.95 1.09* 1.06*   GLUCOSE mg/dL 72* 87 84   CALCIUM mg/dL 8.2* 8.0* 7.7*   ANION GAP mmol/L 16 16 15   EGFR mL/min/1.73m*2 65 55* 57*   PHOSPHORUS mg/dL 3.6 4.0 4.7     Results from last 72 hours   Lab Units 10/12/23  0617 10/11/23  0535 10/10/23  1327 10/10/23  0502   ALK PHOS U/L 72  --   --   --    BILIRUBIN TOTAL mg/dL 0.4  --   --   --    PROTEIN TOTAL g/dL 5.9*  --  5.6*  --    ALT U/L 25  --   --   --    AST U/L 15  --   --   --    ALBUMIN g/dL 3.3* 3.3*  --  3.1*     Estimated Creatinine Clearance: 42.5 mL/min (by C-G formula based on SCr of 0.95 mg/dL).  C-Reactive Protein   Date Value Ref Range Status   10/03/2023 0.84 <1.00 mg/dL Final     Microbiology  Susceptibility data from last 14 days.  Collected Specimen Info Organism   10/02/23 Blood culture from Peripheral Venipuncture Coagulase negative staphylococcus     Imaging  Reviewed      Assessment/Plan   Bacteremia, CNS, the repeat BC is negative  Respiratory failure / atelectasis, intermittent hypoxia is likely recurrent atelectasis, less O2   Failure to thrive     Recommendations :  Continue Zosyn     I spent  minutes in the professional and overall care of this patient.      Leslye Cortez MD

## 2023-10-12 NOTE — DISCHARGE SUMMARY
Discharge Diagnosis  Chronic obstructive pulmonary disease (CMS/[facility])    Issues Requiring Follow-Up  Acute hypoxic respiratory failure 2/2 COPD exacerbation    Discharge Meds     Your medication list      as of October 2, 2023  3:33 AM     You have not been prescribed any medications.         Test Results Pending At Discharge  Pending Labs       Order Current Status    AFB Culture/Smear In process    Blood Gas Lactic Acid, Venous In process    Blood Gas Venous Full Panel In process    Body Fluid Cell Count With Differential In process    Extra Tubes In process    Green Top In process    POCT PT/INR In process    AFB Culture/Smear Preliminary result    Fungal Culture/Smear Preliminary result    Fungal Culture/Smear Preliminary result    Respiratory Culture/Smear Preliminary result    Sterile Fluid Culture/Smear Preliminary result            Hospital Course  Christie Arias is a 68 y.o. 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 On presentation, T 38, BP 96/64, , RR 20, pOx 100% on non-rebreather. Labs notable for WBC 12.1 with left shift, D-dimer 1950, lactate 2.0, troponin 2074 -> 1201, , VBG showing hypercarbic respiratory acidosis. EKG notable for prolonged Qtc 518. CXR showed no acute process, CT angio PE showed no PE, improved LL lobe mucous plugging and post-obstructive atelectasis since the prior imaging. Patient received 2L NS, IV Vanc/Zosyn, Solumedrol 125mg x1, 2units of pRBC. Patient was placed on BiPAP and was transferred to the floor for further management for suspected upper GIB and acute hypoxic/hyper apneic respiratory failure in the setting of suspected sepsis. On the floor, weaned to NC and started on prednisone. Antibiotics discontinued given low concern for infection. GI consulted and saw no signs of GI bleed.  Pt started on iron supplementation given labs showing iron deficiency.    On 10/3, notified of 1/2 sets of blood cx from admission positive for staph epidermidis. ID consulted, started on vancomycin (10/3), discontinued on 10/5 after repeat blood cultures negative and CRP/ESR WNL. Given persistent episodes of oxygen desaturation despite treating for COPD exacerbation, pulmonology consulted. Recommended continuing prednisone course. Patient was found to have continuous diffuse consolidation of the left lung, so she underwent a bronchoscopy which showed significant improvement of respiratory status. Repeat CXR after bronchoscopy showed left sided pleural effusion, so she underwent a thoracentesis which drained 950 ccs of yellow fluid. After thora, patient had significant improvement of respiratory status. 2 nights prior to discharge, the patient had an episode of worsening pulmonary edema, was given a dose of IV Lasix 20 with improvement of symptoms. Pulmonology signed off due to improvement of respiratory status, recommending a steroid taper. Discussed with ID, no need for outpatient antibiotics as the patient completed 6 days of Zosyn. She was instructed to continue her home medications as prescribed, and will remain stable on 2L O2 continuously.  She was instructed to follow up with her PCP in 1 week for a post hospital follow up visit.    Pertinent Physical Exam At Time of Discharge  Physical Exam  Vitals reviewed.   Constitutional:       General: She is not in acute distress.     Appearance: She is underweight. She is not ill-appearing or diaphoretic.   Cardiovascular:      Rate and Rhythm: Normal rate and regular rhythm.      Pulses: Normal pulses.      Heart sounds: Normal heart sounds. No murmur heard.  Pulmonary:      Effort: Pulmonary effort is normal. No respiratory distress.      Breath sounds: Normal breath sounds. No wheezing.      Comments: Improvement in respiratory status from admission  Chest:       Chest wall: No tenderness.   Abdominal:      General: Abdomen is flat. Bowel sounds are normal. There is no distension.      Palpations: Abdomen is soft.      Tenderness: There is no abdominal tenderness.   Musculoskeletal:         General: No tenderness.      Right lower leg: No edema.      Left lower leg: No edema.   Neurological:      General: No focal deficit present.      Mental Status: She is alert and oriented to person, place, and time. Mental status is at baseline.         Outpatient Follow-Up  No future appointments.      Kita Lr MD

## 2023-10-12 NOTE — PROGRESS NOTES
Physical Therapy    Physical Therapy Treatment    Patient Name: Christie Arias  MRN: [id number]  Today's Date: 10/12/2023  Time Calculation  Start Time: 1138  Stop Time: 1203  Time Calculation (min): 25 min       Assessment/Plan   PT Assessment  PT Assessment Results: Decreased strength, Decreased endurance, Impaired balance, Decreased mobility, Decreased coordination, Decreased cognition  Rehab Prognosis: Fair  Barriers to Discharge: Cognition, anxious behaviors  Evaluation/Treatment Tolerance: Patient limited by fatigue (fear)  Medical Staff Made Aware: Yes  Strengths: Housing layout, Support of Caregivers  End of Session Communication: Bedside nurse  End of Session Patient Position:  (chair position in bed, call light in reach)  PT Plan  Inpatient/Swing Bed or Outpatient: Inpatient  PT Plan  Treatment/Interventions: Bed mobility, Transfer training, Gait training, Stair training, Balance training, Neuromuscular re-education, Strengthening, Endurance training, Range of motion, Therapeutic exercise, Therapeutic activity, Home exercise program, Positioning  PT Plan: Skilled PT  PT Frequency: 3 times per week  PT Discharge Recommendations: Moderate intensity level of continued care  Equipment Recommended upon Discharge: Wheeled walker  PT Recommended Transfer Status:  (assist of 1-2)      General Visit Information:   PT  Visit  PT Received On: 10/12/23  General  Reason for Referral: Pt is a 69 y/o woman who was admitted for worsening SOB  Referred By: Gold Avila  Past Medical History Relevant to Rehab: dementia, malnutrition, COPD, 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  "Frail/emaciated appearance. Soft spoken and responds and speaks with single word response but when questioned to elaborate or larger questions patient demonstrates full sentences.)    Subjective   Precautions:  Precautions  Medical Precautions: Fall precautions, Oxygen therapy device and L/min (4L O2, IV, purewick)  Vital Signs:  Vital Signs  SpO2: 97 % (on 1L o2 vvia NC, po2 with increased activity up to % on the 1L.)    Objective   Pain:  Pain Assessment  Pain Assessment:  (c/o \"ouch \" when each knee started AArom with knee flex and left hip flexion. short lived and AAROM became AROM.)  Pain Score:  (non tomas)  Cognition:  Cognition  Overall Cognitive Status: Impaired  Orientation Level: Disoriented to situation, Disoriented to time  Attention: Within Functional Limits  Problem Solving: Exceptions to WFL  Complex Functional Tasks: Impaired  Insight: Mild  Impulsive: Mildly  Postural Control:  Postural Control  Postural Control: Within Functional Limits (limited endurance but improved trunk control wh8ile sitting EOB as complared to last tx. Sat with SBA assist, Moving in all 4 plains.)  Trunk Control:  (limited and unable)  Righting Reactions:  (poor)  Extremity/Trunk Assessments:    Activity Tolerance:  Activity Tolerance  Endurance: Tolerates 10 - 20 min exercise with multiple rests  Treatments:  Therapeutic Exercise  Therapeutic Exercise Performed: No  Therapeutic Exercise Activity 1: Supine (AAROM bilat LE's ankle pumps. quad sets, heel slides and reistive hip/knee extension all x10 reps)    Therapeutic Activity  Therapeutic Activity Performed: Yes  Therapeutic Activity 1:  (education, repositioning in bed)    Balance/Neuromuscular Re-Education  Balance/Neuromuscular Re-Education Activity Performed: Yes    Bed Mobility  Bed Mobility: Yes  Bed Mobility 1  Bed Mobility 1: Rolling right, Rolling left, Scooting, Supine to sitting, Sitting to supine (along with pulling up in bed. all x1 -2 assist and VC/TC " for safety and technique)  Level of Assistance 1: Moderate assistance, Moderate verbal cues, Moderate tactile cues  Bed Mobility Comments 1:  (log roll with vc for safety and technique)    Outcome Measures:  [facility] Basic Mobility  Turning from your back to your side while in a flat bed without using bedrails: A lot  Moving from lying on your back to sitting on the side of a flat bed without using bedrails: A lot  Moving to and from bed to chair (including a wheelchair): Total  Standing up from a chair using your arms (e.g. wheelchair or bedside chair): Total  To walk in hospital room: Total  Climbing 3-5 steps with railing: Total  Basic Mobility - Total Score: 8    Education Documentation  Precautions, taught by Misty Samuels PTA at 10/12/2023  2:00 PM.  Learner: Patient  Readiness: Acceptance  Method: Explanation  Response: Verbalizes Understanding, Demonstrated Understanding    Mobility Training, taught by Misty Samuels PTA at 10/12/2023  2:00 PM.  Learner: Patient  Readiness: Acceptance  Method: Explanation  Response: Verbalizes Understanding, Demonstrated Understanding    Education Comments  No comments found.        OP EDUCATION:  Education  Individual(s) Educated: Patient  Patient/Caregiver Demonstrated Understanding: yes  Patient Response to Education: Patient/Caregiver Performed Return Demonstration of Exercises/Activities    Encounter Problems       Encounter Problems (Active)       Balance       STG - Maintains dynamic sitting balance with upper extremity support x 10' supervision  (Progressing)       Start:  10/03/23    Expected End:  10/17/23       INTERVENTIONS:  1. Practice sitting on the edge of a bed/mat with minimal support.  2. Educate patient about maintining total hip precautions while maintaining balance.  3. Educate patient about pressure relief.  4. Educate patient about use of assistive device.            Pain - Adult          Transfers       STG - Patient will perform bed mobility CGA   (Progressing)       Start:  10/03/23    Expected End:  10/17/23            STG - Patient will stand pivot and sit<>stand transfers min A  (Progressing)       Start:  10/03/23    Expected End:  10/17/23

## 2023-10-12 NOTE — PROGRESS NOTES
Nutrition Follow-up Note  Subjective       Food and Nutrient History: disoriented to time, did not interview at this time, PO % since admission, often 100%.    Physical Ability to Self-Feed: Yes (independant)    Objective   Per Flowsheet Percent Meal intake: 100  Dietary Orders (From admission, onward)       Start     Ordered    10/09/23 1631  Adult diet Regular  Diet effective now        Comments: Aspiration precautions   Question:  Diet type  Answer:  Regular    10/09/23 1632    10/09/23 1345  Oral nutritional supplements  Until discontinued        Comments: Vanilla with dinner once oral diet resumes   Question Answer Comment   Deliver with Dinner    Select supplement: Magic Cup        10/09/23 1345                    Current Facility-Administered Medications:     acetaminophen (Tylenol) tablet 650 mg, 650 mg, oral, q4h PRN, 650 mg at 10/11/23 1342 **OR** [DISCONTINUED] acetaminophen (Tylenol) oral liquid 650 mg, 650 mg, oral, q4h PRN **OR** [DISCONTINUED] acetaminophen (Tylenol) suppository 650 mg, 650 mg, rectal, q4h PRN, Yaz Biswas MD    atorvastatin (Lipitor) tablet 80 mg, 80 mg, oral, Nightly, Delilah Sandoval MD, 80 mg at 10/11/23 2040    docusate sodium (Colace) capsule 100 mg, 100 mg, oral, BID PRN, Yaz Biswas MD, 100 mg at 10/08/23 2110    enoxaparin (Lovenox) syringe 30 mg, 30 mg, subcutaneous, q24h, Delilah Sandoval MD, 30 mg at 10/11/23 1337    fluticasone furoate-vilanteroL (Breo Ellipta) 100-25 mcg/dose inhaler 1 puff, 1 puff, inhalation, Daily, Delilah Sandoval MD, 1 puff at 10/12/23 0815    guaiFENesin (Robitussin) 100 mg/5 mL syrup 100 mg, 100 mg, oral, 4x daily PRN, Delilah Sandoval MD    ipratropium-albuteroL (Duo-Neb) 0.5-2.5 mg/3 mL nebulizer solution 3 mL, 3 mL, nebulization, q2h PRN, Delilah Sandoval MD, 3 mL at 10/06/23 0320    ipratropium-albuteroL (Duo-Neb) 0.5-2.5 mg/3 mL nebulizer solution 3 mL, 3 mL, nebulization, TID, Gold Avila DO, 3 mL at 10/12/23 0921    melatonin tablet 5 mg, 5 mg, oral, Nightly,  Yaz Biswas MD, 5 mg at 10/11/23 2039    mirtazapine (Remeron) tablet 15 mg, 15 mg, oral, q24h, Delilah Sandoval MD, 15 mg at 10/11/23 2040    ondansetron ODT (Zofran-ODT) disintegrating tablet 4 mg, 4 mg, oral, q8h PRN, 4 mg at 10/06/23 1912 **OR** [DISCONTINUED] ondansetron (Zofran) injection 4 mg, 4 mg, intravenous, q8h PRN, Yaz Biswas MD    oxygen (O2) therapy, , inhalation, Continuous PRN - O2/gases, Faheem Irizarry, DO, Start at 10/12/23 0921    pantoprazole (ProtoNix) EC tablet 40 mg, 40 mg, oral, Daily before breakfast, Delilah Sandoval MD, 40 mg at 10/12/23 0630    piperacillin-tazobactam-dextrose (Zosyn) IV 4.5 g, 4.5 g, intravenous, q6h, Gold Avila, DO, Stopped at 10/12/23 1138    polyethylene glycol (Glycolax, Miralax) packet 17 g, 17 g, oral, Daily PRN, Yaz Biswas MD    potassium chloride (Klor-Con) packet 20 mEq, 20 mEq, oral, BID, Quang Corley MD, 20 mEq at 10/12/23 0816    potassium chloride CR (Klor-Con M20) ER tablet 40 mEq, 40 mEq, oral, BID, Campos Gamble DO, 40 mEq at 10/12/23 0816    predniSONE (Deltasone) tablet 40 mg, 40 mg, oral, Daily, Delilah Sandoval MD, 40 mg at 10/12/23 0816    QUEtiapine (SEROquel) tablet 12.5 mg, 12.5 mg, oral, Nightly, Delilah Sandoval MD, 12.5 mg at 10/11/23 2039    sertraline (Zoloft) tablet 25 mg, 25 mg, oral, Daily, Delilah Sandoval MD, 25 mg at 10/12/23 0816    tiotropium (Spiriva Respimat) 2.5 mcg/actuation inhaler 2 puff, 2 puff, inhalation, Daily, Delilah Sandoval MD, 2 puff at 10/12/23 0630  Results from last 7 days   Lab Units 10/12/23  0617 10/11/23  0535 10/10/23  0502   GLUCOSE mg/dL 72* 87 84   SODIUM mmol/L 136 138 140   POTASSIUM mmol/L 3.0* 3.1* 3.0*   CHLORIDE mmol/L 98 100 102   CO2 mmol/L 25 25 26   BUN mg/dL 17 23 20   CREATININE mg/dL 0.95 1.09* 1.06*   EGFR mL/min/1.73m*2 65 55* 57*   CALCIUM mg/dL 8.2* 8.0* 7.7*   PHOSPHORUS mg/dL 3.6 4.0 4.7   MAGNESIUM mg/dL 1.61 1.65 1.74     Lab Results   Component Value Date    HGBA1C 5.2 08/02/2022         Past Medical  "History:   Diagnosis Date    Personal history of other diseases of the digestive system     History of diverticulitis of colon     Principal Problem:    Chronic obstructive pulmonary disease (CMS/HCC)  Active Problems:    Respiratory failure (CMS/HCC)    Severe protein-calorie malnutrition (CMS/HCC)    Iron deficiency anemia    Anxiety    Failure to thrive in adult    Hospital discharge follow-up     Allergies: Codeine      Anthropometrics:  Height: 175.3 cm (5' 9\")  Weight: 48.8 kg (107 lb 9.4 oz)  BMI (Calculated): 15.88    Daily Weight  10/12/23 : 48.8 kg (107 lb 9.4 oz)  10/11/23 : 51.6 kg  10/10/23 : 52.4 kg      Nutrition Focused Physical Findings:   Edema  Edema: none    Pain Score: 0 - No pain     GI per flowsheet:  Gastrointestinal  Gastrointestinal (WDL): Within Defined Limits  Abdomen Inspection: Soft, Flat, Nondistended  Abdominal Tenderness: No guarding, Nontender  Bowel Sounds: All quadrants  Bowel Sounds (All Quadrants): Active  Passing Flatus: Yes  Last BM Date: 10/12/23  Bowel Incontinence: Yes  Last bowel movement documented: 10/12/23    Diagnosis   Patient has Malnutrition Diagnosis: Yes  Diagnosis Status: Ongoing  Malnutrition Diagnosis: Severe malnutrition related to chronic disease or condition  As Evidenced by: severe muscle and fat wasting, BMI = 16.11  Additional Assessment Information: obtain intake history as available    Intervention:     Interventions: Meals and snacks  Meals and Snacks:  ([place] diet, encourage snacks and PO as able)  Magic Cup 1/day    Recommendation(s):  Individualized Nutrition Prescription Provided for : Ensure Plus HP BID (350kcal, 20g protein each)      Monitoring and Evaluation   Weights  Labs  PO intake        Time Spent (min): 45 minutes  Last Date of Nutrition Visit: 10/12/23  Nutrition Follow-Up Needed?: Dietitian to reassess per policy    "

## 2023-10-14 LAB
BACTERIA FLD CULT: NORMAL
GRAM STN SPEC: NORMAL
GRAM STN SPEC: NORMAL

## 2023-10-16 LAB
BACTERIA SPEC RESP CULT: NORMAL
GRAM STN SPEC: NORMAL
GRAM STN SPEC: NORMAL

## 2023-10-30 LAB
FUNGUS SPEC CULT: NORMAL
FUNGUS SPEC FUNGUS STN: NORMAL

## 2023-11-29 LAB
ACID FAST STN SPEC: NORMAL
ACID FAST STN SPEC: NORMAL
MYCOBACTERIUM SPEC CULT: NORMAL
MYCOBACTERIUM SPEC CULT: NORMAL

## 2023-12-13 ENCOUNTER — HOSPITAL ENCOUNTER (OUTPATIENT)
Dept: CARDIOLOGY | Facility: HOSPITAL | Age: 68
Discharge: HOME | End: 2023-12-13
Payer: MEDICARE

## 2023-12-13 LAB — HOLD SPECIMEN: NORMAL

## 2023-12-13 PROCEDURE — 93005 ELECTROCARDIOGRAM TRACING: CPT

## 2024-02-04 ENCOUNTER — APPOINTMENT (OUTPATIENT)
Dept: CARDIOLOGY | Facility: HOSPITAL | Age: 69
End: 2024-02-04
Payer: MEDICAID

## 2024-02-04 ENCOUNTER — APPOINTMENT (OUTPATIENT)
Dept: RADIOLOGY | Facility: HOSPITAL | Age: 69
End: 2024-02-04
Payer: MEDICAID

## 2024-02-04 ENCOUNTER — HOSPITAL ENCOUNTER (OUTPATIENT)
Facility: HOSPITAL | Age: 69
Setting detail: OBSERVATION
Discharge: NURSING, REHAB OR LONG-TERM CARE | End: 2024-02-05
Attending: STUDENT IN AN ORGANIZED HEALTH CARE EDUCATION/TRAINING PROGRAM | Admitting: INTERNAL MEDICINE
Payer: MEDICAID

## 2024-02-04 DIAGNOSIS — I70.90 ATHEROSCLEROSIS: ICD-10-CM

## 2024-02-04 DIAGNOSIS — R91.1 PULMONARY NODULE: ICD-10-CM

## 2024-02-04 DIAGNOSIS — R07.9 CHEST PAIN, UNSPECIFIED TYPE: Primary | ICD-10-CM

## 2024-02-04 DIAGNOSIS — I71.23 ANEURYSM OF DESCENDING THORACIC AORTA WITHOUT RUPTURE (CMS-HCC): ICD-10-CM

## 2024-02-04 DIAGNOSIS — R56.9 SEIZURE-LIKE ACTIVITY (MULTI): ICD-10-CM

## 2024-02-04 DIAGNOSIS — J43.9 PULMONARY EMPHYSEMA, UNSPECIFIED EMPHYSEMA TYPE (MULTI): ICD-10-CM

## 2024-02-04 DIAGNOSIS — K57.90 DIVERTICULOSIS: ICD-10-CM

## 2024-02-04 DIAGNOSIS — I15.9 SECONDARY HYPERTENSION: ICD-10-CM

## 2024-02-04 DIAGNOSIS — D50.8 OTHER IRON DEFICIENCY ANEMIA: Chronic | ICD-10-CM

## 2024-02-04 DIAGNOSIS — D50.9 IRON DEFICIENCY ANEMIA, UNSPECIFIED IRON DEFICIENCY ANEMIA TYPE: ICD-10-CM

## 2024-02-04 DIAGNOSIS — I69.30 HISTORY OF STROKE WITH RESIDUAL DEFICIT: ICD-10-CM

## 2024-02-04 DIAGNOSIS — E87.6 HYPOKALEMIA: ICD-10-CM

## 2024-02-04 LAB
AMPHETAMINES UR QL SCN: NORMAL
ANION GAP SERPL CALC-SCNC: 19 MMOL/L (ref 10–20)
APPEARANCE UR: CLEAR
BACTERIA #/AREA URNS AUTO: ABNORMAL /HPF
BARBITURATES UR QL SCN: NORMAL
BASOPHILS # BLD AUTO: 0.1 X10*3/UL (ref 0–0.1)
BASOPHILS NFR BLD AUTO: 1 %
BENZODIAZ UR QL SCN: NORMAL
BILIRUB UR STRIP.AUTO-MCNC: NEGATIVE MG/DL
BNP SERPL-MCNC: 866 PG/ML (ref 0–99)
BUN SERPL-MCNC: 21 MG/DL (ref 6–23)
BZE UR QL SCN: NORMAL
CALCIUM SERPL-MCNC: 9.3 MG/DL (ref 8.6–10.3)
CANNABINOIDS UR QL SCN: NORMAL
CARDIAC TROPONIN I PNL SERPL HS: 26 NG/L (ref 0–13)
CARDIAC TROPONIN I PNL SERPL HS: 26 NG/L (ref 0–13)
CHLORIDE SERPL-SCNC: 94 MMOL/L (ref 98–107)
CO2 SERPL-SCNC: 26 MMOL/L (ref 21–32)
COLOR UR: YELLOW
CREAT SERPL-MCNC: 0.83 MG/DL (ref 0.5–1.05)
D DIMER PPP FEU-MCNC: 4821 NG/ML FEU
EGFRCR SERPLBLD CKD-EPI 2021: 76 ML/MIN/1.73M*2
EOSINOPHIL # BLD AUTO: 0.22 X10*3/UL (ref 0–0.7)
EOSINOPHIL NFR BLD AUTO: 2.2 %
ERYTHROCYTE [DISTWIDTH] IN BLOOD BY AUTOMATED COUNT: 15.8 % (ref 11.5–14.5)
FENTANYL+NORFENTANYL UR QL SCN: NORMAL
FLUAV RNA RESP QL NAA+PROBE: NOT DETECTED
FLUBV RNA RESP QL NAA+PROBE: NOT DETECTED
GLUCOSE SERPL-MCNC: 137 MG/DL (ref 74–99)
GLUCOSE UR STRIP.AUTO-MCNC: NEGATIVE MG/DL
HCT VFR BLD AUTO: 31.5 % (ref 36–46)
HGB BLD-MCNC: 10.1 G/DL (ref 12–16)
IMM GRANULOCYTES # BLD AUTO: 0.04 X10*3/UL (ref 0–0.7)
IMM GRANULOCYTES NFR BLD AUTO: 0.4 % (ref 0–0.9)
KETONES UR STRIP.AUTO-MCNC: NEGATIVE MG/DL
LACTATE SERPL-SCNC: 0.8 MMOL/L (ref 0.4–2)
LEUKOCYTE ESTERASE UR QL STRIP.AUTO: ABNORMAL
LYMPHOCYTES # BLD AUTO: 1.55 X10*3/UL (ref 1.2–4.8)
LYMPHOCYTES NFR BLD AUTO: 15.3 %
MAGNESIUM SERPL-MCNC: 1.69 MG/DL (ref 1.6–2.4)
MCH RBC QN AUTO: 24.2 PG (ref 26–34)
MCHC RBC AUTO-ENTMCNC: 32.1 G/DL (ref 32–36)
MCV RBC AUTO: 76 FL (ref 80–100)
MONOCYTES # BLD AUTO: 0.73 X10*3/UL (ref 0.1–1)
MONOCYTES NFR BLD AUTO: 7.2 %
NEUTROPHILS # BLD AUTO: 7.52 X10*3/UL (ref 1.2–7.7)
NEUTROPHILS NFR BLD AUTO: 73.9 %
NITRITE UR QL STRIP.AUTO: NEGATIVE
NRBC BLD-RTO: 0 /100 WBCS (ref 0–0)
OPIATES UR QL SCN: NORMAL
OXYCODONE+OXYMORPHONE UR QL SCN: NORMAL
PCP UR QL SCN: NORMAL
PH UR STRIP.AUTO: 8 [PH]
PLATELET # BLD AUTO: 475 X10*3/UL (ref 150–450)
POTASSIUM SERPL-SCNC: 2.9 MMOL/L (ref 3.5–5.3)
PROT UR STRIP.AUTO-MCNC: NEGATIVE MG/DL
RBC # BLD AUTO: 4.17 X10*6/UL (ref 4–5.2)
RBC # UR STRIP.AUTO: NEGATIVE /UL
RBC #/AREA URNS AUTO: ABNORMAL /HPF
SARS-COV-2 RNA RESP QL NAA+PROBE: NOT DETECTED
SODIUM SERPL-SCNC: 136 MMOL/L (ref 136–145)
SP GR UR STRIP.AUTO: 1.01
UROBILINOGEN UR STRIP.AUTO-MCNC: <2 MG/DL
WBC # BLD AUTO: 10.2 X10*3/UL (ref 4.4–11.3)
WBC #/AREA URNS AUTO: ABNORMAL /HPF

## 2024-02-04 PROCEDURE — 87636 SARSCOV2 & INF A&B AMP PRB: CPT | Performed by: STUDENT IN AN ORGANIZED HEALTH CARE EDUCATION/TRAINING PROGRAM

## 2024-02-04 PROCEDURE — 81001 URINALYSIS AUTO W/SCOPE: CPT | Mod: 59 | Performed by: STUDENT IN AN ORGANIZED HEALTH CARE EDUCATION/TRAINING PROGRAM

## 2024-02-04 PROCEDURE — 36415 COLL VENOUS BLD VENIPUNCTURE: CPT | Performed by: STUDENT IN AN ORGANIZED HEALTH CARE EDUCATION/TRAINING PROGRAM

## 2024-02-04 PROCEDURE — 83880 ASSAY OF NATRIURETIC PEPTIDE: CPT | Performed by: STUDENT IN AN ORGANIZED HEALTH CARE EDUCATION/TRAINING PROGRAM

## 2024-02-04 PROCEDURE — 2550000001 HC RX 255 CONTRASTS: Performed by: STUDENT IN AN ORGANIZED HEALTH CARE EDUCATION/TRAINING PROGRAM

## 2024-02-04 PROCEDURE — 71275 CT ANGIOGRAPHY CHEST: CPT

## 2024-02-04 PROCEDURE — 2500000004 HC RX 250 GENERAL PHARMACY W/ HCPCS (ALT 636 FOR OP/ED): Performed by: STUDENT IN AN ORGANIZED HEALTH CARE EDUCATION/TRAINING PROGRAM

## 2024-02-04 PROCEDURE — 84484 ASSAY OF TROPONIN QUANT: CPT | Performed by: STUDENT IN AN ORGANIZED HEALTH CARE EDUCATION/TRAINING PROGRAM

## 2024-02-04 PROCEDURE — 74174 CTA ABD&PLVS W/CONTRAST: CPT | Performed by: RADIOLOGY

## 2024-02-04 PROCEDURE — 71275 CT ANGIOGRAPHY CHEST: CPT | Performed by: RADIOLOGY

## 2024-02-04 PROCEDURE — 85025 COMPLETE CBC W/AUTO DIFF WBC: CPT | Performed by: STUDENT IN AN ORGANIZED HEALTH CARE EDUCATION/TRAINING PROGRAM

## 2024-02-04 PROCEDURE — 71045 X-RAY EXAM CHEST 1 VIEW: CPT

## 2024-02-04 PROCEDURE — 84146 ASSAY OF PROLACTIN: CPT | Mod: GEALAB | Performed by: STUDENT IN AN ORGANIZED HEALTH CARE EDUCATION/TRAINING PROGRAM

## 2024-02-04 PROCEDURE — 80307 DRUG TEST PRSMV CHEM ANLYZR: CPT | Performed by: STUDENT IN AN ORGANIZED HEALTH CARE EDUCATION/TRAINING PROGRAM

## 2024-02-04 PROCEDURE — 85379 FIBRIN DEGRADATION QUANT: CPT | Performed by: STUDENT IN AN ORGANIZED HEALTH CARE EDUCATION/TRAINING PROGRAM

## 2024-02-04 PROCEDURE — 83735 ASSAY OF MAGNESIUM: CPT | Performed by: STUDENT IN AN ORGANIZED HEALTH CARE EDUCATION/TRAINING PROGRAM

## 2024-02-04 PROCEDURE — 83605 ASSAY OF LACTIC ACID: CPT | Performed by: STUDENT IN AN ORGANIZED HEALTH CARE EDUCATION/TRAINING PROGRAM

## 2024-02-04 PROCEDURE — 87086 URINE CULTURE/COLONY COUNT: CPT | Performed by: STUDENT IN AN ORGANIZED HEALTH CARE EDUCATION/TRAINING PROGRAM

## 2024-02-04 PROCEDURE — 71045 X-RAY EXAM CHEST 1 VIEW: CPT | Performed by: RADIOLOGY

## 2024-02-04 PROCEDURE — 99285 EMERGENCY DEPT VISIT HI MDM: CPT | Mod: 25 | Performed by: STUDENT IN AN ORGANIZED HEALTH CARE EDUCATION/TRAINING PROGRAM

## 2024-02-04 PROCEDURE — 93005 ELECTROCARDIOGRAM TRACING: CPT

## 2024-02-04 PROCEDURE — 70450 CT HEAD/BRAIN W/O DYE: CPT

## 2024-02-04 PROCEDURE — 70450 CT HEAD/BRAIN W/O DYE: CPT | Performed by: RADIOLOGY

## 2024-02-04 PROCEDURE — 80048 BASIC METABOLIC PNL TOTAL CA: CPT | Performed by: STUDENT IN AN ORGANIZED HEALTH CARE EDUCATION/TRAINING PROGRAM

## 2024-02-04 RX ORDER — POTASSIUM CHLORIDE 20 MEQ/1
40 TABLET, EXTENDED RELEASE ORAL ONCE
Status: COMPLETED | OUTPATIENT
Start: 2024-02-04 | End: 2024-02-04

## 2024-02-04 RX ADMIN — IOHEXOL 75 ML: 350 INJECTION, SOLUTION INTRAVENOUS at 21:21

## 2024-02-04 RX ADMIN — POTASSIUM CHLORIDE 40 MEQ: 1500 TABLET, EXTENDED RELEASE ORAL at 21:20

## 2024-02-04 ASSESSMENT — PAIN SCALES - GENERAL
PAINLEVEL_OUTOF10: 0 - NO PAIN
PAINLEVEL_OUTOF10: 0 - NO PAIN

## 2024-02-04 ASSESSMENT — LIFESTYLE VARIABLES
HAVE YOU EVER FELT YOU SHOULD CUT DOWN ON YOUR DRINKING: NO
EVER HAD A DRINK FIRST THING IN THE MORNING TO STEADY YOUR NERVES TO GET RID OF A HANGOVER: NO
EVER FELT BAD OR GUILTY ABOUT YOUR DRINKING: NO
HAVE PEOPLE ANNOYED YOU BY CRITICIZING YOUR DRINKING: NO

## 2024-02-04 ASSESSMENT — PAIN - FUNCTIONAL ASSESSMENT: PAIN_FUNCTIONAL_ASSESSMENT: 0-10

## 2024-02-05 ENCOUNTER — APPOINTMENT (OUTPATIENT)
Dept: CARDIOLOGY | Facility: HOSPITAL | Age: 69
End: 2024-02-05
Payer: MEDICAID

## 2024-02-05 ENCOUNTER — APPOINTMENT (OUTPATIENT)
Dept: RADIOLOGY | Facility: HOSPITAL | Age: 69
End: 2024-02-05
Payer: MEDICAID

## 2024-02-05 VITALS
HEIGHT: 69 IN | WEIGHT: 89.73 LBS | BODY MASS INDEX: 13.29 KG/M2 | TEMPERATURE: 97.2 F | OXYGEN SATURATION: 90 % | HEART RATE: 88 BPM | DIASTOLIC BLOOD PRESSURE: 67 MMHG | RESPIRATION RATE: 18 BRPM | SYSTOLIC BLOOD PRESSURE: 102 MMHG

## 2024-02-05 PROBLEM — R07.9 CHEST PAIN, UNSPECIFIED TYPE: Status: RESOLVED | Noted: 2024-02-05 | Resolved: 2024-02-05

## 2024-02-05 PROBLEM — R07.9 CHEST PAIN, UNSPECIFIED TYPE: Status: ACTIVE | Noted: 2024-02-05

## 2024-02-05 LAB
ANION GAP SERPL CALC-SCNC: 13 MMOL/L (ref 10–20)
APTT PPP: 28 SECONDS (ref 27–38)
BUN SERPL-MCNC: 15 MG/DL (ref 6–23)
CALCIUM SERPL-MCNC: 9.2 MG/DL (ref 8.6–10.3)
CARDIAC TROPONIN I PNL SERPL HS: 29 NG/L (ref 0–13)
CHLORIDE SERPL-SCNC: 98 MMOL/L (ref 98–107)
CHOLEST SERPL-MCNC: 137 MG/DL (ref 0–199)
CHOLESTEROL/HDL RATIO: 2.9
CO2 SERPL-SCNC: 26 MMOL/L (ref 21–32)
CREAT SERPL-MCNC: 0.8 MG/DL (ref 0.5–1.05)
EGFRCR SERPLBLD CKD-EPI 2021: 79 ML/MIN/1.73M*2
ERYTHROCYTE [DISTWIDTH] IN BLOOD BY AUTOMATED COUNT: 15.5 % (ref 11.5–14.5)
FERRITIN SERPL-MCNC: 34 NG/ML (ref 8–150)
GLUCOSE SERPL-MCNC: 89 MG/DL (ref 74–99)
HCT VFR BLD AUTO: 29.2 % (ref 36–46)
HDLC SERPL-MCNC: 48 MG/DL
HGB BLD-MCNC: 9.1 G/DL (ref 12–16)
INR PPP: 1.1 (ref 0.9–1.1)
IRON SATN MFR SERPL: 5 % (ref 25–45)
IRON SERPL-MCNC: 20 UG/DL (ref 35–150)
LDLC SERPL CALC-MCNC: 69 MG/DL
MCH RBC QN AUTO: 23.6 PG (ref 26–34)
MCHC RBC AUTO-ENTMCNC: 31.2 G/DL (ref 32–36)
MCV RBC AUTO: 76 FL (ref 80–100)
NON HDL CHOLESTEROL: 89 MG/DL (ref 0–149)
NRBC BLD-RTO: 0 /100 WBCS (ref 0–0)
PLATELET # BLD AUTO: 454 X10*3/UL (ref 150–450)
POTASSIUM SERPL-SCNC: 3.4 MMOL/L (ref 3.5–5.3)
PROLACTIN SERPL-MCNC: 11.4 UG/L (ref 3–20)
PROTHROMBIN TIME: 12.6 SECONDS (ref 9.8–12.8)
RBC # BLD AUTO: 3.85 X10*6/UL (ref 4–5.2)
SODIUM SERPL-SCNC: 134 MMOL/L (ref 136–145)
TIBC SERPL-MCNC: 391 UG/DL (ref 240–445)
TRIGL SERPL-MCNC: 100 MG/DL (ref 0–149)
TSH SERPL-ACNC: 1.04 MIU/L (ref 0.44–3.98)
UIBC SERPL-MCNC: 371 UG/DL (ref 110–370)
VLDL: 20 MG/DL (ref 0–40)
WBC # BLD AUTO: 8.5 X10*3/UL (ref 4.4–11.3)

## 2024-02-05 PROCEDURE — 82728 ASSAY OF FERRITIN: CPT | Performed by: INTERNAL MEDICINE

## 2024-02-05 PROCEDURE — 99223 1ST HOSP IP/OBS HIGH 75: CPT | Performed by: INTERNAL MEDICINE

## 2024-02-05 PROCEDURE — 2500000001 HC RX 250 WO HCPCS SELF ADMINISTERED DRUGS (ALT 637 FOR MEDICARE OP): Performed by: INTERNAL MEDICINE

## 2024-02-05 PROCEDURE — 80061 LIPID PANEL: CPT | Performed by: INTERNAL MEDICINE

## 2024-02-05 PROCEDURE — 36415 COLL VENOUS BLD VENIPUNCTURE: CPT | Performed by: INTERNAL MEDICINE

## 2024-02-05 PROCEDURE — 70551 MRI BRAIN STEM W/O DYE: CPT

## 2024-02-05 PROCEDURE — G0378 HOSPITAL OBSERVATION PER HR: HCPCS

## 2024-02-05 PROCEDURE — 99233 SBSQ HOSP IP/OBS HIGH 50: CPT | Performed by: PHYSICIAN ASSISTANT

## 2024-02-05 PROCEDURE — 99222 1ST HOSP IP/OBS MODERATE 55: CPT

## 2024-02-05 PROCEDURE — 2500000004 HC RX 250 GENERAL PHARMACY W/ HCPCS (ALT 636 FOR OP/ED): Performed by: INTERNAL MEDICINE

## 2024-02-05 PROCEDURE — 85610 PROTHROMBIN TIME: CPT | Performed by: INTERNAL MEDICINE

## 2024-02-05 PROCEDURE — 84484 ASSAY OF TROPONIN QUANT: CPT | Performed by: INTERNAL MEDICINE

## 2024-02-05 PROCEDURE — 83540 ASSAY OF IRON: CPT | Performed by: INTERNAL MEDICINE

## 2024-02-05 PROCEDURE — 84443 ASSAY THYROID STIM HORMONE: CPT | Performed by: INTERNAL MEDICINE

## 2024-02-05 PROCEDURE — 70551 MRI BRAIN STEM W/O DYE: CPT | Performed by: RADIOLOGY

## 2024-02-05 PROCEDURE — 85027 COMPLETE CBC AUTOMATED: CPT | Performed by: INTERNAL MEDICINE

## 2024-02-05 PROCEDURE — 93005 ELECTROCARDIOGRAM TRACING: CPT

## 2024-02-05 PROCEDURE — 80048 BASIC METABOLIC PNL TOTAL CA: CPT | Performed by: INTERNAL MEDICINE

## 2024-02-05 RX ORDER — MIRTAZAPINE 15 MG/1
15 TABLET, FILM COATED ORAL EVERY 24 HOURS
Status: DISCONTINUED | OUTPATIENT
Start: 2024-02-05 | End: 2024-02-05 | Stop reason: HOSPADM

## 2024-02-05 RX ORDER — AMLODIPINE BESYLATE 10 MG/1
10 TABLET ORAL DAILY
COMMUNITY
End: 2024-02-05 | Stop reason: HOSPADM

## 2024-02-05 RX ORDER — ACETAMINOPHEN 500 MG
5 TABLET ORAL NIGHTLY
Status: DISCONTINUED | OUTPATIENT
Start: 2024-02-05 | End: 2024-02-05 | Stop reason: HOSPADM

## 2024-02-05 RX ORDER — FERROUS SULFATE 325(65) MG
65 TABLET ORAL 2 TIMES DAILY
Status: DISCONTINUED | OUTPATIENT
Start: 2024-02-05 | End: 2024-02-05 | Stop reason: HOSPADM

## 2024-02-05 RX ORDER — NITROGLYCERIN 0.4 MG/1
0.4 TABLET SUBLINGUAL EVERY 5 MIN PRN
Status: DISCONTINUED | OUTPATIENT
Start: 2024-02-05 | End: 2024-02-05 | Stop reason: HOSPADM

## 2024-02-05 RX ORDER — GUAIFENESIN 100 MG/5ML
100 SOLUTION ORAL 4 TIMES DAILY PRN
Status: DISCONTINUED | OUTPATIENT
Start: 2024-02-05 | End: 2024-02-05 | Stop reason: HOSPADM

## 2024-02-05 RX ORDER — CALCIUM CARBONATE 500(1250)
1250 TABLET ORAL EVERY 12 HOURS
Status: DISCONTINUED | OUTPATIENT
Start: 2024-02-05 | End: 2024-02-05 | Stop reason: HOSPADM

## 2024-02-05 RX ORDER — ATORVASTATIN CALCIUM 80 MG/1
80 TABLET, FILM COATED ORAL NIGHTLY
Status: DISCONTINUED | OUTPATIENT
Start: 2024-02-05 | End: 2024-02-05 | Stop reason: HOSPADM

## 2024-02-05 RX ORDER — AMLODIPINE BESYLATE 10 MG/1
10 TABLET ORAL
Status: DISCONTINUED | OUTPATIENT
Start: 2024-02-05 | End: 2024-02-05

## 2024-02-05 RX ORDER — HEPARIN SODIUM 5000 [USP'U]/ML
5000 INJECTION, SOLUTION INTRAVENOUS; SUBCUTANEOUS EVERY 8 HOURS SCHEDULED
Status: DISCONTINUED | OUTPATIENT
Start: 2024-02-05 | End: 2024-02-05 | Stop reason: HOSPADM

## 2024-02-05 RX ORDER — DOCUSATE SODIUM 100 MG/1
100 CAPSULE, LIQUID FILLED ORAL 2 TIMES DAILY
Status: DISCONTINUED | OUTPATIENT
Start: 2024-02-05 | End: 2024-02-05 | Stop reason: HOSPADM

## 2024-02-05 RX ORDER — MELATONIN 5 MG
5 CAPSULE ORAL NIGHTLY
COMMUNITY

## 2024-02-05 RX ORDER — TRAMADOL HYDROCHLORIDE 50 MG/1
50 TABLET ORAL EVERY 6 HOURS PRN
Status: DISCONTINUED | OUTPATIENT
Start: 2024-02-05 | End: 2024-02-05 | Stop reason: HOSPADM

## 2024-02-05 RX ORDER — ASPIRIN 81 MG/1
81 TABLET ORAL DAILY
Status: DISCONTINUED | OUTPATIENT
Start: 2024-02-05 | End: 2024-02-05 | Stop reason: HOSPADM

## 2024-02-05 RX ORDER — ACETAMINOPHEN 650 MG/1
650 SUPPOSITORY RECTAL EVERY 4 HOURS PRN
Status: DISCONTINUED | OUTPATIENT
Start: 2024-02-05 | End: 2024-02-05 | Stop reason: HOSPADM

## 2024-02-05 RX ORDER — POTASSIUM CHLORIDE 20 MEQ/1
20 TABLET, EXTENDED RELEASE ORAL DAILY
Status: DISCONTINUED | OUTPATIENT
Start: 2024-02-05 | End: 2024-02-05 | Stop reason: HOSPADM

## 2024-02-05 RX ORDER — METOPROLOL SUCCINATE 25 MG/1
25 TABLET, EXTENDED RELEASE ORAL DAILY
Qty: 30 TABLET | Refills: 0
Start: 2024-02-06 | End: 2024-03-07

## 2024-02-05 RX ORDER — AMLODIPINE BESYLATE 10 MG/1
10 TABLET ORAL DAILY
Status: DISCONTINUED | OUTPATIENT
Start: 2024-02-05 | End: 2024-02-05

## 2024-02-05 RX ORDER — ACETAMINOPHEN 160 MG/5ML
650 SOLUTION ORAL EVERY 4 HOURS PRN
Status: DISCONTINUED | OUTPATIENT
Start: 2024-02-05 | End: 2024-02-05 | Stop reason: HOSPADM

## 2024-02-05 RX ORDER — GADOTERATE MEGLUMINE 376.9 MG/ML
0.2 INJECTION INTRAVENOUS
Status: DISCONTINUED | OUTPATIENT
Start: 2024-02-05 | End: 2024-02-05 | Stop reason: HOSPADM

## 2024-02-05 RX ORDER — FERROUS SULFATE 325(65) MG
65 TABLET ORAL 2 TIMES DAILY
Qty: 60 TABLET | Refills: 0
Start: 2024-02-05 | End: 2024-03-06

## 2024-02-05 RX ORDER — POTASSIUM CHLORIDE 20 MEQ/1
40 TABLET, EXTENDED RELEASE ORAL ONCE
Status: COMPLETED | OUTPATIENT
Start: 2024-02-05 | End: 2024-02-05

## 2024-02-05 RX ORDER — SERTRALINE HYDROCHLORIDE 50 MG/1
25 TABLET, FILM COATED ORAL DAILY
Status: DISCONTINUED | OUTPATIENT
Start: 2024-02-05 | End: 2024-02-05 | Stop reason: HOSPADM

## 2024-02-05 RX ORDER — AMLODIPINE BESYLATE 2.5 MG/1
5 TABLET ORAL
Qty: 60 TABLET | Refills: 0
Start: 2024-02-05 | End: 2024-02-05 | Stop reason: HOSPADM

## 2024-02-05 RX ORDER — PANTOPRAZOLE SODIUM 40 MG/1
40 TABLET, DELAYED RELEASE ORAL
Status: DISCONTINUED | OUTPATIENT
Start: 2024-02-05 | End: 2024-02-05 | Stop reason: HOSPADM

## 2024-02-05 RX ORDER — ACETAMINOPHEN 325 MG/1
650 TABLET ORAL EVERY 4 HOURS PRN
Status: DISCONTINUED | OUTPATIENT
Start: 2024-02-05 | End: 2024-02-05 | Stop reason: HOSPADM

## 2024-02-05 RX ORDER — METOPROLOL SUCCINATE 25 MG/1
25 TABLET, EXTENDED RELEASE ORAL DAILY
Status: DISCONTINUED | OUTPATIENT
Start: 2024-02-06 | End: 2024-02-05 | Stop reason: HOSPADM

## 2024-02-05 RX ADMIN — AMLODIPINE BESYLATE 10 MG: 10 TABLET ORAL at 06:10

## 2024-02-05 RX ADMIN — POTASSIUM CHLORIDE 40 MEQ: 1500 TABLET, EXTENDED RELEASE ORAL at 06:48

## 2024-02-05 RX ADMIN — ASPIRIN 81 MG: 81 TABLET, COATED ORAL at 08:24

## 2024-02-05 RX ADMIN — CALCIUM 1250 MG: 500 TABLET ORAL at 02:41

## 2024-02-05 RX ADMIN — TRAMADOL HYDROCHLORIDE 50 MG: 50 TABLET, COATED ORAL at 02:54

## 2024-02-05 RX ADMIN — PANTOPRAZOLE SODIUM 40 MG: 40 TABLET, DELAYED RELEASE ORAL at 06:10

## 2024-02-05 RX ADMIN — HEPARIN SODIUM 5000 UNITS: 5000 INJECTION INTRAVENOUS; SUBCUTANEOUS at 02:42

## 2024-02-05 RX ADMIN — HEPARIN SODIUM 5000 UNITS: 5000 INJECTION INTRAVENOUS; SUBCUTANEOUS at 10:33

## 2024-02-05 RX ADMIN — MIRTAZAPINE 15 MG: 15 TABLET, FILM COATED ORAL at 02:41

## 2024-02-05 RX ADMIN — FERROUS SULFATE TAB 325 MG (65 MG ELEMENTAL FE) 1 TABLET: 325 (65 FE) TAB at 08:24

## 2024-02-05 RX ADMIN — ATORVASTATIN CALCIUM 80 MG: 80 TABLET, FILM COATED ORAL at 02:41

## 2024-02-05 RX ADMIN — SERTRALINE HYDROCHLORIDE 25 MG: 50 TABLET ORAL at 08:23

## 2024-02-05 RX ADMIN — CALCIUM 1250 MG: 500 TABLET ORAL at 13:30

## 2024-02-05 RX ADMIN — DOCUSATE SODIUM 100 MG: 100 CAPSULE, LIQUID FILLED ORAL at 02:42

## 2024-02-05 RX ADMIN — DOCUSATE SODIUM 100 MG: 100 CAPSULE, LIQUID FILLED ORAL at 08:24

## 2024-02-05 RX ADMIN — POTASSIUM CHLORIDE 20 MEQ: 1500 TABLET, EXTENDED RELEASE ORAL at 08:24

## 2024-02-05 SDOH — SOCIAL STABILITY: SOCIAL INSECURITY: HAVE YOU HAD THOUGHTS OF HARMING ANYONE ELSE?: NO

## 2024-02-05 SDOH — SOCIAL STABILITY: SOCIAL INSECURITY: ARE THERE ANY APPARENT SIGNS OF INJURIES/BEHAVIORS THAT COULD BE RELATED TO ABUSE/NEGLECT?: NO

## 2024-02-05 SDOH — SOCIAL STABILITY: SOCIAL INSECURITY: DO YOU FEEL ANYONE HAS EXPLOITED OR TAKEN ADVANTAGE OF YOU FINANCIALLY OR OF YOUR PERSONAL PROPERTY?: NO

## 2024-02-05 SDOH — SOCIAL STABILITY: SOCIAL INSECURITY: DOES ANYONE TRY TO KEEP YOU FROM HAVING/CONTACTING OTHER FRIENDS OR DOING THINGS OUTSIDE YOUR HOME?: NO

## 2024-02-05 SDOH — SOCIAL STABILITY: SOCIAL INSECURITY: HAS ANYONE EVER THREATENED TO HURT YOUR FAMILY OR YOUR PETS?: NO

## 2024-02-05 SDOH — SOCIAL STABILITY: SOCIAL INSECURITY: WERE YOU ABLE TO COMPLETE ALL THE BEHAVIORAL HEALTH SCREENINGS?: YES

## 2024-02-05 SDOH — SOCIAL STABILITY: SOCIAL INSECURITY: DO YOU FEEL UNSAFE GOING BACK TO THE PLACE WHERE YOU ARE LIVING?: NO

## 2024-02-05 SDOH — SOCIAL STABILITY: SOCIAL INSECURITY: ABUSE: ADULT

## 2024-02-05 SDOH — SOCIAL STABILITY: SOCIAL INSECURITY: ARE YOU OR HAVE YOU BEEN THREATENED OR ABUSED PHYSICALLY, EMOTIONALLY, OR SEXUALLY BY ANYONE?: NO

## 2024-02-05 ASSESSMENT — COGNITIVE AND FUNCTIONAL STATUS - GENERAL
MOBILITY SCORE: 9
EATING MEALS: A LITTLE
DRESSING REGULAR LOWER BODY CLOTHING: A LITTLE
TOILETING: A LOT
STANDING UP FROM CHAIR USING ARMS: TOTAL
DRESSING REGULAR UPPER BODY CLOTHING: A LOT
MOVING FROM LYING ON BACK TO SITTING ON SIDE OF FLAT BED WITH BEDRAILS: A LITTLE
MOBILITY SCORE: 9
CLIMB 3 TO 5 STEPS WITH RAILING: TOTAL
PATIENT BASELINE BEDBOUND: UNABLE TO ASSESS AT THIS TIME
MOVING FROM LYING ON BACK TO SITTING ON SIDE OF FLAT BED WITH BEDRAILS: A LITTLE
DAILY ACTIVITIY SCORE: 13
MOVING TO AND FROM BED TO CHAIR: TOTAL
EATING MEALS: A LOT
HELP NEEDED FOR BATHING: A LOT
DAILY ACTIVITIY SCORE: 14
PERSONAL GROOMING: A LOT
TOILETING: A LOT
HELP NEEDED FOR BATHING: A LOT
WALKING IN HOSPITAL ROOM: TOTAL
STANDING UP FROM CHAIR USING ARMS: TOTAL
TURNING FROM BACK TO SIDE WHILE IN FLAT BAD: A LOT
MOVING TO AND FROM BED TO CHAIR: TOTAL
DRESSING REGULAR UPPER BODY CLOTHING: A LOT
TURNING FROM BACK TO SIDE WHILE IN FLAT BAD: A LOT
DRESSING REGULAR LOWER BODY CLOTHING: A LITTLE
PERSONAL GROOMING: A LOT
WALKING IN HOSPITAL ROOM: TOTAL
CLIMB 3 TO 5 STEPS WITH RAILING: TOTAL

## 2024-02-05 ASSESSMENT — ACTIVITIES OF DAILY LIVING (ADL)
DRESSING YOURSELF: NEEDS ASSISTANCE
HEARING - LEFT EAR: FUNCTIONAL
BATHING: NEEDS ASSISTANCE
TOILETING: NEEDS ASSISTANCE
ADEQUATE_TO_COMPLETE_ADL: YES
ASSISTIVE_DEVICE: OTHER (COMMENT)
HEARING - RIGHT EAR: FUNCTIONAL
PATIENT'S MEMORY ADEQUATE TO SAFELY COMPLETE DAILY ACTIVITIES?: YES
LACK_OF_TRANSPORTATION: NO
WALKS IN HOME: NEEDS ASSISTANCE
FEEDING YOURSELF: NEEDS ASSISTANCE
GROOMING: NEEDS ASSISTANCE
JUDGMENT_ADEQUATE_SAFELY_COMPLETE_DAILY_ACTIVITIES: YES

## 2024-02-05 ASSESSMENT — PATIENT HEALTH QUESTIONNAIRE - PHQ9
1. LITTLE INTEREST OR PLEASURE IN DOING THINGS: NOT AT ALL
2. FEELING DOWN, DEPRESSED OR HOPELESS: NOT AT ALL
SUM OF ALL RESPONSES TO PHQ9 QUESTIONS 1 & 2: 0

## 2024-02-05 ASSESSMENT — PAIN - FUNCTIONAL ASSESSMENT
PAIN_FUNCTIONAL_ASSESSMENT: 0-10
PAIN_FUNCTIONAL_ASSESSMENT: 0-10

## 2024-02-05 ASSESSMENT — ENCOUNTER SYMPTOMS
DYSURIA: 0
WHEEZING: 0
PALPITATIONS: 0
ALLERGIC/IMMUNOLOGIC NEGATIVE: 1
FREQUENCY: 0
DIARRHEA: 0
DIFFICULTY URINATING: 0
LIGHT-HEADEDNESS: 0
WEAKNESS: 1
SHORTNESS OF BREATH: 0
ABDOMINAL PAIN: 0
ABDOMINAL DISTENTION: 0
NUMBNESS: 0
CONSTITUTIONAL NEGATIVE: 1
NAUSEA: 0
ENDOCRINE NEGATIVE: 1
RESPIRATORY NEGATIVE: 1
PSYCHIATRIC NEGATIVE: 1
MYALGIAS: 0
COLOR CHANGE: 0
FATIGUE: 1
MUSCULOSKELETAL NEGATIVE: 1
FLANK PAIN: 0
CONFUSION: 0
NEUROLOGICAL NEGATIVE: 1
HEMATOLOGIC/LYMPHATIC NEGATIVE: 1
FEVER: 0
EYES NEGATIVE: 1
COUGH: 0
NAUSEA: 1
VOMITING: 0
HEMATURIA: 0
DIZZINESS: 0
CHILLS: 0

## 2024-02-05 ASSESSMENT — PAIN DESCRIPTION - LOCATION: LOCATION: LEG

## 2024-02-05 ASSESSMENT — LIFESTYLE VARIABLES
HOW MANY STANDARD DRINKS CONTAINING ALCOHOL DO YOU HAVE ON A TYPICAL DAY: PATIENT DOES NOT DRINK
AUDIT-C TOTAL SCORE: 0
HOW OFTEN DO YOU HAVE A DRINK CONTAINING ALCOHOL: NEVER
HOW OFTEN DO YOU HAVE 6 OR MORE DRINKS ON ONE OCCASION: NEVER
AUDIT-C TOTAL SCORE: 0
SKIP TO QUESTIONS 9-10: 1

## 2024-02-05 ASSESSMENT — PAIN SCALES - GENERAL
PAINLEVEL_OUTOF10: 0 - NO PAIN
PAINLEVEL_OUTOF10: 0 - NO PAIN
PAINLEVEL_OUTOF10: 6

## 2024-02-05 ASSESSMENT — PAIN DESCRIPTION - ORIENTATION: ORIENTATION: LEFT

## 2024-02-05 ASSESSMENT — VISUAL ACUITY: VA_NORMAL: 1

## 2024-02-05 NOTE — ED PROVIDER NOTES
HPI:    Chief Complaint   Patient presents with    Chest Pain     Pt had chest pain and was given nitroglycerin  She may have had a seizure she is not sure She ate after and feels fine now-        Christie Arias is a 68 y.o. female presents with chief complaint of chest pain/seizure.  Patient presents from [place].  Report was obtained from EMS and nurse to nurse from facility and chart review as patient has a history of dementia limiting HPI.  Patient here states she had chest pain on the left side of her chest after dinner.  The seizure was reported to occur around 5 PM and was 2 minutes long.  They reported as being she was staring up and leaned back and had light tremors of her extremities with a sort of posturing.  Her chest pain is on the left side of her chest it is not able described in character but states she has had it before and was told she had arthritis to this area.  She was given nitro and presently here she has no chest pain.  She stated she feels nauseated at dinnertime and was given Zofran.  Currently she is asymptomatic and at her baseline which is alert and oriented x 2.  At baseline she is not ambulatory.  Does not have a history of seizures.  No antiseizure medications were given.  Nurse reports from facility that the chest pain has been on and off for the past 2 to 3 days.          ROS:All other review of systems are negative except as noted above and HPI or ROS. (Bolded if positive)  CONSTITUTIONAL fever, chills.  EYES:      blurry vision, change in vision  ENT sore throat, congestion, rhinorrhea.  CARDIOVASCULAR palpitations, swelling, chest pain  RESPIRATORY cough, shortness of breath, wheeze  GI abdominal pain, nausea, vomiting, diarrhea.  GENITOURINARY dysuria, hematuria, frequency.  MUSCULOSKELETAL deformity, neck pain.  SKIN rash, color change.  NEUROLOGIC  headache, numbness, focal weakness.    Physical exam  General/Constitutional: Alert, oriented to person place and time,  cooperative,  in no acute distress.  Head: normocephalic, atraumatic  Skin: Intact,  dry skin, no lesions.  Eyes: PERRL, EOMs intact,  Conjunctiva pink with no erythema or exudates. No scleral icterus.   ENT: No external deformities. Nares patent, mucus membranes moist.  Pharynx clear, uvula midline.   Neck: Supple, without meningismus. Trachea at midline. No lymphadenopathy. No JVD  Pulmonary: Clear bilaterally. No crackles, rhonchi or wheezing.   Cardiac: Regular rate and regular rhythm.  Pulses 2+ throughout upper and lower extremities.  No murmur, gallop, rub.   Abdomen: Soft, nontender, active bowel sounds.  No palpable organomegaly.  No rebound or guarding.  No CVA tenderness.  No pulsatile mass  Genitourinary: Exam deferred.  Musculoskeletal: Full range of motion, no deformity. Pulses full and equal. Non-edematous.  Neurological: Alert and oriented to person place and time.  no focal neuro deficits.  Sensation intact throughout.  Neurovascular intact in bilateral upper and lower extremities.  Weakness in left lower extremity more so than right (not new patient has prior residual weakness from CVA)  Psychiatric: Appropriate mood and affect. Calm.       Past Medical History:   Diagnosis Date    Personal history of other diseases of the digestive system     History of diverticulitis of colon      Social History     Socioeconomic History    Marital status:      Spouse name: Not on file    Number of children: Not on file    Years of education: Not on file    Highest education level: Not on file   Occupational History    Not on file   Tobacco Use    Smoking status: Never    Smokeless tobacco: Never   Substance and Sexual Activity    Alcohol use: Not on file    Drug use: Not on file    Sexual activity: Not on file   Other Topics Concern    Not on file   Social History Narrative    Not on file     Social Determinants of Health     Financial Resource Strain: Low Risk  (10/11/2023)    Overall Financial Resource  Strain (CARDIA)     Difficulty of Paying Living Expenses: Not hard at all   Food Insecurity: No Food Insecurity (10/11/2023)    Hunger Vital Sign     Worried About Running Out of Food in the Last Year: Never true     Ran Out of Food in the Last Year: Never true   Transportation Needs: No Transportation Needs (10/11/2023)    PRAPARE - Transportation     Lack of Transportation (Medical): No     Lack of Transportation (Non-Medical): No   Physical Activity: Inactive (10/2/2023)    Exercise Vital Sign     Days of Exercise per Week: 0 days     Minutes of Exercise per Session: 0 min   Stress: Not on file   Social Connections: Not on file   Intimate Partner Violence: Not on file   Housing Stability: Low Risk  (10/11/2023)    Housing Stability Vital Sign     Unable to Pay for Housing in the Last Year: No     Number of Places Lived in the Last Year: 1     Unstable Housing in the Last Year: No     Current Outpatient Medications   Medication Instructions    acetaminophen (TYLENOL) 325 mg, oral, Every 6 hours PRN    amLODIPine (NORVASC) 10 mg, oral, Daily RT    aspirin 81 mg, oral, 2 times daily    atorvastatin (Lipitor) 80 mg tablet 1 tablet, oral, Nightly    calcium carbonate (Oscal) 500 mg calcium (1,250 mg) tablet 1 tablet, oral, Every 12 hours    guaiFENesin (ROBITUSSIN) 100 mg, oral, 4 times daily PRN    ipratropium-albuteroL (Duo-Neb) 0.5-2.5 mg/3 mL nebulizer solution Inhale the contents of 1 vial using nebulizer three times a day and every 2 hours if needed for shortness of breath for wheezing(cough., request).    ipratropium-albuteroL (Duo-Neb) 0.5-2.5 mg/3 mL nebulizer solution inhale the contents of 1 vial using nebulizer three times a day and every 2 hours if needed for shortness of breath    melaton-genistein-herb no.233 1.5-15-22 mg-mg-mcg tablet,chewable 3 mg, oral, Daily    mirtazapine (REMERON) 15 mg, oral, Every 24 hours    nitroglycerin (NITROSTAT) 0.4 mg, sublingual, Every 5 min PRN    ondansetron (ZOFRAN) 4  mg, oral, Every 6 hours PRN    pantoprazole (PROTONIX) 40 mg, oral    potassium chloride CR 20 mEq ER tablet 20 mEq, oral, Daily, Do not crush or chew.    QUEtiapine (SEROQUEL) 12.5 mg, oral, Nightly    sertraline (ZOLOFT) 25 mg, oral, Daily    tiotropium (Spiriva Respimat) 2.5 mcg/actuation inhaler 2 puffs, inhalation, Daily RT    traMADol (ULTRAM) 50 mg, oral, Every 6 hours PRN     Allergies   Allergen Reactions    Codeine Unknown           ED Triage Vitals [[birth year]/1953]   Temperature Heart Rate Respirations BP   36.8 °C (98.2 °F) 98 18 (!) 147/107      Pulse Ox Temp Source Heart Rate Source Patient Position   96 % Temporal Monitor Lying      BP Location FiO2 (%)     Left arm --                 Labs and Imaging  CT head wo IV contrast   Final Result   1.  No acute intracranial hemorrhage or acute territorial infarct.             MACRO:   None.        Signed by: Amanda Bettencourt 2/4/2024 11:26 PM   Dictation workstation:   [id number]      CT angio chest abdomen pelvis   Final Result   1. 4.5 x 4.4 cm distal descending thoracic aortic aneurysm. No acute   dissection. Redemonstration of 0.6 cm focal outpouching along the   inferior aspect of the aortic arch adjacent to a calcified ligament.   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   2. Severe atherosclerotic disease. Patent aorto bi-iliac bypass   graft. 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 Severe stenosis at the origins of the   bilateral renal arteries. Small caliber right renal artery with   cortical atrophy and decreased enhancement of the right kidney.   3. Emphysema. 1.0 cm nodularity at the left lower lobe extending to   the pleura, may be secondary to atelectasis or scarring. CT chest in   3 months recommended to re-evaluate and exclude underlying neoplasm.   4. Colonic diverticulosis.                  MACRO:   Critical Finding:  See findings. Notification was initiated on    2/4/2024 at 11:47 pm by  Amanda Bettencourt.  (**-YCF-**) Instructions:        Signed by: Amanda Bettencourt 2/4/2024 11:54 PM   Dictation workstation:   [id number]      XR chest 1 view   Final Result   1. No acute cardiopulmonary process.   2. Findings of emphysema.        MACRO:   None.        Signed by: Marilou Leyv 2/4/2024 8:35 PM   Dictation workstation:   [id number]        Labs Reviewed   CBC WITH AUTO DIFFERENTIAL - Abnormal       Result Value    WBC 10.2      nRBC 0.0      RBC 4.17      Hemoglobin 10.1 (*)     Hematocrit 31.5 (*)     MCV 76 (*)     MCH 24.2 (*)     MCHC 32.1      RDW 15.8 (*)     Platelets 475 (*)     Neutrophils % 73.9      Immature Granulocytes %, Automated 0.4      Lymphocytes % 15.3      Monocytes % 7.2      Eosinophils % 2.2      Basophils % 1.0      Neutrophils Absolute 7.52      Immature Granulocytes Absolute, Automated 0.04      Lymphocytes Absolute 1.55      Monocytes Absolute 0.73      Eosinophils Absolute 0.22      Basophils Absolute 0.10     BASIC METABOLIC PANEL - Abnormal    Glucose 137 (*)     Sodium 136      Potassium 2.9 (*)     Chloride 94 (*)     Bicarbonate 26      Anion Gap 19      Urea Nitrogen 21      Creatinine 0.83      eGFR 76      Calcium 9.3     URINALYSIS WITH REFLEX MICROSCOPIC - Abnormal    Color, Urine Yellow      Appearance, Urine Clear      Specific Gravity, Urine 1.014      pH, Urine 8.0      Protein, Urine NEGATIVE      Glucose, Urine NEGATIVE      Blood, Urine NEGATIVE      Ketones, Urine NEGATIVE      Bilirubin, Urine NEGATIVE      Urobilinogen, Urine <2.0      Nitrite, Urine NEGATIVE      Leukocyte Esterase, Urine MODERATE (2+) (*)    B-TYPE NATRIURETIC PEPTIDE - Abnormal     (*)     Narrative:        <100 pg/mL - Heart failure unlikely  100-299 pg/mL - Intermediate probability of acute heart                  failure exacerbation. Correlate with clinical                  context and patient history.    >=300 pg/mL - Heart Failure likely.  Correlate with clinical                  context and patient history.    BNP testing is performed using different testing methodology at [facility] than at other system hospitals. Direct result comparisons should only be made within the same method.      D-DIMER, NON VTE - Abnormal    D-Dimer Non VTE, Quant (ng/mL FEU) 4,821 (*)     Narrative:     The D-Dimer assay is reported in ng/mL Fibrinogen Equivalent Units (FEU). The results of this assay should NOT be used for the exclusion of Deep Vein Thrombosis and/or Pulmonary Embolism.   SERIAL TROPONIN-INITIAL - Abnormal    Troponin I, High Sensitivity 26 (*)     Narrative:     Less than 99th percentile of normal range cutoff-  Female and children under 18 years old <14 ng/L; Male <21 ng/L: Negative  Repeat testing should be performed if clinically indicated.     Female and children under 18 years old 14-50 ng/L; Male 21-50 ng/L:  Consistent with possible cardiac damage and possible increased clinical   risk. Serial measurements may help to assess extent of myocardial damage.     >50 ng/L: Consistent with cardiac damage, increased clinical risk and  myocardial infarction. Serial measurements may help assess extent of   myocardial damage.      NOTE: Children less than 1 year old may have higher baseline troponin   levels and results should be interpreted in conjunction with the overall   clinical context.     NOTE: Troponin I testing is performed using a different   testing methodology at [facility] than at other   [facility] hospitals. Direct result comparisons should only   be made within the same method.   SERIAL TROPONIN, 1 HOUR - Abnormal    Troponin I, High Sensitivity 26 (*)     Narrative:     Less than 99th percentile of normal range cutoff-  Female and children under 18 years old <14 ng/L; Male <21 ng/L: Negative  Repeat testing should be performed if clinically indicated.     Female and children under 18 years old 14-50 ng/L; Male 21-50  ng/L:  Consistent with possible cardiac damage and possible increased clinical   risk. Serial measurements may help to assess extent of myocardial damage.     >50 ng/L: Consistent with cardiac damage, increased clinical risk and  myocardial infarction. Serial measurements may help assess extent of   myocardial damage.      NOTE: Children less than 1 year old may have higher baseline troponin   levels and results should be interpreted in conjunction with the overall   clinical context.     NOTE: Troponin I testing is performed using a different   testing methodology at [facility] than at other   [facility]. Direct result comparisons should only   be made within the same method.   MICROSCOPIC ONLY, URINE - Abnormal    WBC, Urine 1-5      RBC, Urine NONE      Bacteria, Urine 1+ (*)    INFLUENZA A AND B PCR - Normal    Flu A Result Not Detected      Flu B Result Not Detected      Narrative:     This assay is an in vitro diagnostic multiplex nucleic acid amplification test for the detection and discrimination of Influenza A & B from nasopharyngeal specimens, and has been validated for use at [facility]. Negative results do not preclude Influenza A/B infections, and should not be used as the sole basis for diagnosis, treatment, or other management decisions. If Influenza A/B and RSV PCR results are negative, testing for Parainfluenza virus, Adenovirus and Metapneumovirus is routinely performed for [facility] [place] and intensive care inpatients, and is available on other patients by placing an add-on request.   SARS-COV-2 PCR, SYMPTOMATIC - Normal    Coronavirus 2019, PCR Not Detected      Narrative:     This assay has received FDA Emergency Use Authorization (EUA) and is only authorized for the duration of time that circumstances exist to justify the authorization of the emergency use of in vitro diagnostic tests for the detection of SARS-CoV-2 virus and/or diagnosis of  COVID-19 infection under section 564(b)(1) of the Act, 21 U.S.C. 360bbb-3(b)(1). This assay is an in vitro diagnostic nucleic acid amplification test for the qualitative detection of SARS-CoV-2 from nasopharyngeal specimens and has been validated for use at [facility]. Negative results do not preclude COVID-19 infections and should not be used as the sole basis for diagnosis, treatment, or other management decisions.     LACTATE - Normal    Lactate 0.8      Narrative:     Venipuncture immediately after or during the administration of Metamizole may lead to falsely low results. Testing should be performed immediately  prior to Metamizole dosing.   DRUG SCREEN,URINE - Normal    Amphetamine Screen, Urine Presumptive Negative      Barbiturate Screen, Urine Presumptive Negative      Benzodiazepines Screen, Urine Presumptive Negative      Cannabinoid Screen, Urine Presumptive Negative      Cocaine Metabolite Screen, Urine Presumptive Negative      Fentanyl Screen, Urine Presumptive Negative      Opiate Screen, Urine Presumptive Negative      Oxycodone Screen, Urine Presumptive Negative      PCP Screen, Urine Presumptive Negative      Narrative:     Drug screen results are presumptive and should not be used to assess   compliance with prescribed medication. Contact the performing [facility] laboratory   to add-on definitive confirmatory testing if clinically indicated.    Toxicology screening results are reported qualitatively. The concentration must   be greater than or equal to the cutoff to be reported as positive. The concentration   at which the screening test can detect an individual drug or metabolite varies.   The absence of expected drug(s) and/or drug metabolite(s) may indicate non-compliance,   inappropriate timing of specimen collection relative to drug administration, poor drug   absorption, diluted/adulterated urine, or limitations of testing. For medical purposes   only; not valid for  forensic use.    Interpretive questions should be directed to the laboratory medical directors.   MAGNESIUM - Normal    Magnesium 1.69     URINE CULTURE   TROPONIN SERIES- (INITIAL, 1 HR)    Narrative:     The following orders were created for panel order Troponin I Series, High Sensitivity (0, 1 HR).  Procedure                               Abnormality         Status                     ---------                               -----------         ------                     Troponin I, High Sensiti...[[id number]]  Abnormal            Final result               Troponin, High Sensitivi...[[id number]]  Abnormal            Final result                 Please view results for these tests on the individual orders.   PROLACTIN               Procedure  Procedures       Medical Decision Making:     External Records Reviewed: I reviewed recent and relevant outside records    ED Course as of 02/05/24 0019   Sun Feb 04, 2024 1957 EKG interpreted by me shows normal sinus rhythm, moderate voltage criteria for LVH.  ST-T abnormalities in inferior lead.  No STEMI.  Rate 95 bpm.  Compared to prior EKG QRS complexes slightly more prominent. [WL]   2038 X-ray shows findings of emphysema. [WL]   2051 POTASSIUM(!!): 2.9  Given p.o. replacement [WL]   2051 Troponin I, High Sensitivity(!): 26 [WL]   2051 BNP(!): 866 [WL]   2051 D-Dimer Non VTE, Quant (ng/mL FEU)(!): 4,821 [WL]   2057 Patient has a POA and her son Jason, I did call him and he would like us to admit her here to the hospital and remain as a DNR CCA. [WL]   Mon Feb 05, 2024 0007 Ct shows 1. 4.5 x 4.4 cm distal descending thoracic aortic aneurysm. No acute  dissection. Redemonstration of 0.6 cm focal outpouching along the  inferior aspect of the aortic arch adjacent to a calcified ligament.  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  2. Severe atherosclerotic disease. Patent aorto bi-iliac bypass  graft. Severe stenosis at the origin of the  celiac artery with  poststenotic dilation. Severe stenosis at the origins of the  bilateral renal arteries. Small caliber right renal artery with  cortical atrophy and decreased enhancement of the right kidney.  3. Emphysema. 1.0 cm nodularity at the left lower lobe extending to  the pleura, may be secondary to atelectasis or scarring. CT chest in  3 months recommended to re-evaluate and exclude underlying neoplasm.  4. Colonic diverticulosis.      Ct head shows acute findings.  Case discussed with Dr Gray who agrees on admission. [WL]   0018 Urine with moderate leukocytes but only 1+ bacteria and 1-5 white blood cells.  Will add a urine culture. [WL]      ED Course User Index  [WL] Osmel Bettencourt,          Diagnoses as of 02/05/24 0019   Chest pain, unspecified type   Hypokalemia   Seizure-like activity (CMS/HCC)   Aneurysm of descending thoracic aorta without rupture (CMS/HCC)   Atherosclerosis   Pulmonary emphysema, unspecified emphysema type (CMS/HCC)   Pulmonary nodule   Diverticulosis         CT head wo IV contrast   Final Result   1.  No acute intracranial hemorrhage or acute territorial infarct.             MACRO:   None.        Signed by: Amanda Bettencourt 2/4/2024 11:26 PM   Dictation workstation:   [id number]      CT angio chest abdomen pelvis   Final Result   1. 4.5 x 4.4 cm distal descending thoracic aortic aneurysm. No acute   dissection. Redemonstration of 0.6 cm focal outpouching along the   inferior aspect of the aortic arch adjacent to a calcified ligament.   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   2. Severe atherosclerotic disease. Patent aorto bi-iliac bypass   graft. 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 Severe stenosis at the origins of the   bilateral renal arteries. Small caliber right renal artery with   cortical atrophy and decreased enhancement of the right kidney.   3. Emphysema. 1.0 cm nodularity at the  left lower lobe extending to   the pleura, may be secondary to atelectasis or scarring. CT chest in   3 months recommended to re-evaluate and exclude underlying neoplasm.   4. Colonic diverticulosis.                  MACRO:   Critical Finding:  See findings. Notification was initiated on   2/4/2024 at 11:47 pm by  Amanda Bettencourt.  (**-YCF-**) Instructions:        Signed by: Amanda Bettencourt 2/4/2024 11:54 PM   Dictation workstation:   [id number]      XR chest 1 view   Final Result   1. No acute cardiopulmonary process.   2. Findings of emphysema.        MACRO:   None.        Signed by: Marilou Levy 2/4/2024 8:35 PM   Dictation workstation:   [id number]        Labs Reviewed   CBC WITH AUTO DIFFERENTIAL - Abnormal       Result Value    WBC 10.2      nRBC 0.0      RBC 4.17      Hemoglobin 10.1 (*)     Hematocrit 31.5 (*)     MCV 76 (*)     MCH 24.2 (*)     MCHC 32.1      RDW 15.8 (*)     Platelets 475 (*)     Neutrophils % 73.9      Immature Granulocytes %, Automated 0.4      Lymphocytes % 15.3      Monocytes % 7.2      Eosinophils % 2.2      Basophils % 1.0      Neutrophils Absolute 7.52      Immature Granulocytes Absolute, Automated 0.04      Lymphocytes Absolute 1.55      Monocytes Absolute 0.73      Eosinophils Absolute 0.22      Basophils Absolute 0.10     BASIC METABOLIC PANEL - Abnormal    Glucose 137 (*)     Sodium 136      Potassium 2.9 (*)     Chloride 94 (*)     Bicarbonate 26      Anion Gap 19      Urea Nitrogen 21      Creatinine 0.83      eGFR 76      Calcium 9.3     URINALYSIS WITH REFLEX MICROSCOPIC - Abnormal    Color, Urine Yellow      Appearance, Urine Clear      Specific Gravity, Urine 1.014      pH, Urine 8.0      Protein, Urine NEGATIVE      Glucose, Urine NEGATIVE      Blood, Urine NEGATIVE      Ketones, Urine NEGATIVE      Bilirubin, Urine NEGATIVE      Urobilinogen, Urine <2.0      Nitrite, Urine NEGATIVE      Leukocyte Esterase, Urine MODERATE (2+) (*)    B-TYPE NATRIURETIC PEPTIDE -  Abnormal     (*)     Narrative:        <100 pg/mL - Heart failure unlikely  100-299 pg/mL - Intermediate probability of acute heart                  failure exacerbation. Correlate with clinical                  context and patient history.    >=300 pg/mL - Heart Failure likely. Correlate with clinical                  context and patient history.    BNP testing is performed using different testing methodology at [facility] than at other [facility]. Direct result comparisons should only be made within the same method.      D-DIMER, NON VTE - Abnormal    D-Dimer Non VTE, Quant (ng/mL FEU) 4,821 (*)     Narrative:     The D-Dimer assay is reported in ng/mL Fibrinogen Equivalent Units (FEU). The results of this assay should NOT be used for the exclusion of Deep Vein Thrombosis and/or Pulmonary Embolism.   SERIAL TROPONIN-INITIAL - Abnormal    Troponin I, High Sensitivity 26 (*)     Narrative:     Less than 99th percentile of normal range cutoff-  Female and children under 18 years old <14 ng/L; Male <21 ng/L: Negative  Repeat testing should be performed if clinically indicated.     Female and children under 18 years old 14-50 ng/L; Male 21-50 ng/L:  Consistent with possible cardiac damage and possible increased clinical   risk. Serial measurements may help to assess extent of myocardial damage.     >50 ng/L: Consistent with cardiac damage, increased clinical risk and  myocardial infarction. Serial measurements may help assess extent of   myocardial damage.      NOTE: Children less than 1 year old may have higher baseline troponin   levels and results should be interpreted in conjunction with the overall   clinical context.     NOTE: Troponin I testing is performed using a different   testing methodology at [facility] than at other   [facility]. Direct result comparisons should only   be made within the same method.   SERIAL TROPONIN, 1 HOUR - Abnormal    Troponin I, High  Sensitivity 26 (*)     Narrative:     Less than 99th percentile of normal range cutoff-  Female and children under 18 years old <14 ng/L; Male <21 ng/L: Negative  Repeat testing should be performed if clinically indicated.     Female and children under 18 years old 14-50 ng/L; Male 21-50 ng/L:  Consistent with possible cardiac damage and possible increased clinical   risk. Serial measurements may help to assess extent of myocardial damage.     >50 ng/L: Consistent with cardiac damage, increased clinical risk and  myocardial infarction. Serial measurements may help assess extent of   myocardial damage.      NOTE: Children less than 1 year old may have higher baseline troponin   levels and results should be interpreted in conjunction with the overall   clinical context.     NOTE: Troponin I testing is performed using a different   testing methodology at [facility] than at other   [facility]. Direct result comparisons should only   be made within the same method.   MICROSCOPIC ONLY, URINE - Abnormal    WBC, Urine 1-5      RBC, Urine NONE      Bacteria, Urine 1+ (*)    INFLUENZA A AND B PCR - Normal    Flu A Result Not Detected      Flu B Result Not Detected      Narrative:     This assay is an in vitro diagnostic multiplex nucleic acid amplification test for the detection and discrimination of Influenza A & B from nasopharyngeal specimens, and has been validated for use at [facility]. Negative results do not preclude Influenza A/B infections, and should not be used as the sole basis for diagnosis, treatment, or other management decisions. If Influenza A/B and RSV PCR results are negative, testing for Parainfluenza virus, Adenovirus and Metapneumovirus is routinely performed for [facility] [place] and intensive care inpatients, and is available on other patients by placing an add-on request.   SARS-COV-2 PCR, SYMPTOMATIC - Normal    Coronavirus 2019, PCR Not Detected       Narrative:     This assay has received FDA Emergency Use Authorization (EUA) and is only authorized for the duration of time that circumstances exist to justify the authorization of the emergency use of in vitro diagnostic tests for the detection of SARS-CoV-2 virus and/or diagnosis of COVID-19 infection under section 564(b)(1) of the Act, 21 U.S.C. 360bbb-3(b)(1). This assay is an in vitro diagnostic nucleic acid amplification test for the qualitative detection of SARS-CoV-2 from nasopharyngeal specimens and has been validated for use at [facility]. Negative results do not preclude COVID-19 infections and should not be used as the sole basis for diagnosis, treatment, or other management decisions.     LACTATE - Normal    Lactate 0.8      Narrative:     Venipuncture immediately after or during the administration of Metamizole may lead to falsely low results. Testing should be performed immediately  prior to Metamizole dosing.   DRUG SCREEN,URINE - Normal    Amphetamine Screen, Urine Presumptive Negative      Barbiturate Screen, Urine Presumptive Negative      Benzodiazepines Screen, Urine Presumptive Negative      Cannabinoid Screen, Urine Presumptive Negative      Cocaine Metabolite Screen, Urine Presumptive Negative      Fentanyl Screen, Urine Presumptive Negative      Opiate Screen, Urine Presumptive Negative      Oxycodone Screen, Urine Presumptive Negative      PCP Screen, Urine Presumptive Negative      Narrative:     Drug screen results are presumptive and should not be used to assess   compliance with prescribed medication. Contact the performing [facility] laboratory   to add-on definitive confirmatory testing if clinically indicated.    Toxicology screening results are reported qualitatively. The concentration must   be greater than or equal to the cutoff to be reported as positive. The concentration   at which the screening test can detect an individual drug or metabolite varies.   The  absence of expected drug(s) and/or drug metabolite(s) may indicate non-compliance,   inappropriate timing of specimen collection relative to drug administration, poor drug   absorption, diluted/adulterated urine, or limitations of testing. For medical purposes   only; not valid for forensic use.    Interpretive questions should be directed to the laboratory medical directors.   MAGNESIUM - Normal    Magnesium 1.69     URINE CULTURE   TROPONIN SERIES- (INITIAL, 1 HR)    Narrative:     The following orders were created for panel order Troponin I Series, High Sensitivity (0, 1 HR).  Procedure                               Abnormality         Status                     ---------                               -----------         ------                     Troponin I, High Sensiti...[[id number]]  Abnormal            Final result               Troponin, High Sensitivi...[[id number]]  Abnormal            Final result                 Please view results for these tests on the individual orders.   PROLACTIN       The patient presented for evaluation of chest pain, seizure.    Differential included but not limited to ACS, arrhythmia, pneumothorax, dissection, aneurysm, electrolyte abnormality, musculoskeletal pain, PE, dyspepsia, dissection.  Imaging studies if performed were independently reviewed and interpreted by myself and confirmed by radiologist.      Note: This note was dictated by speech recognition. Minor errors in transcription may be present.           Osmel Bettencourt, DO  02/05/24 0019

## 2024-02-05 NOTE — PROGRESS NOTES
Christie Arias is a 68 y.o. female on day 0 of admission presenting with Chest pain, unspecified type.      Subjective   She denies any CP, SOB this morning.  Eating breakfast.  She denies h/o seizures.  [facility], only 1st dose worked according to patient.  She does not know that she has had stroke in the past.        Objective     Last Recorded Vitals  /89 (BP Location: Left arm, Patient Position: Lying)   Pulse 73   Temp 36.4 °C (97.5 °F) (Temporal)   Resp 18   Wt (!) 40.7 kg (89 lb 11.6 oz)   SpO2 98%   Intake/Output last 3 Shifts:    Intake/Output Summary (Last 24 hours) at 2/5/2024 1054  Last data filed at 2/5/2024 0958  Gross per 24 hour   Intake 120 ml   Output 200 ml   Net -80 ml       Admission Weight  Weight: (!) 42.6 kg (94 lb) (02/1955)    Daily Weight  02/05/24 : (!) 40.7 kg (89 lb 11.6 oz)    Image Results      Physical Exam  Physical Exam  Gen: NAD, cachectic  Eyes:  EOM intact  ENT: MMM  Neck: No JVD  Respiratory: CTAB, no wheezes/rhonchi  Cardiac: RRR, no murmurs rubs or gallops  Abdomen: soft, NT, +BS  Extremities: no edema or cyanosis  Neuro: No acute focal deficits, chronic left sided facial droop and mild left sided weakness  Psych:  appropriate mood and behavior      Assessment/Plan      Principal Problem:    Chest pain, unspecified type  -improved with SLN  -troponin 26>29  -denies CP currently  -CT showing severe atherosclerosis disease  -would benefit from ischemic eval if none previous  -continue tele  -Echo 6 months ago: preserved EF  -ASA/HI statin  -ddimer elevated  -PE ruled out  -cardiology consulted    Possible seizure  -risk factor of previous strokes  -prolactin negative  -convulsive syncope (?) dt hypotension after 2 doses of SLN  -EEG ordered, unavailable today  -MRI brain w/wo contrast today  -neuro consulted  -seizure precautions    HypoK  -replaced  -recheck in AM    Thoracic AA  -4.5 x 4.4 cm  -outpt follow up to  monitor    Malnourished  -RD consult    JIE  -continue oral iron    DVT prophy  -heparin    Dispo:  pending MRI, cardiology and neuro consult, likely not discharging today  D/w Dr. Victorino Perry PA-C

## 2024-02-05 NOTE — ED NOTES
Pt was brought in by squad from [place] due to she had chest pain- They also think she had a seizure.  She denies any history of seizures.  She is very lucid and co-operative.  She was given 3 nitros before arrival and also 4( 81 mg) ASA.  She is denying chest pain at this time  Her daughter in law, Keke, is an ED Rn at [facility]  and she received an update earlier.  Seizure precautions are in place with pads on the bed.       Yuridia Long RN  02/04/24 [id number]

## 2024-02-05 NOTE — DISCHARGE SUMMARY
Discharge Diagnosis  Chest pain, unspecified type, hypokalemia.  No concern for seizure.    Issues Requiring Follow-Up  JIE started on oral iron  Recheck labs in 1 week  Norvasc stopped, started on BB    Discharge Meds     Your medication list        START taking these medications        Instructions Last Dose Given Next Dose Due   ferrous sulfate (325 mg ferrous sulfate) tablet      Take 1 tablet by mouth 2 times a day.       metoprolol succinate XL 25 mg 24 hr tablet  Commonly known as: Toprol-XL  Start taking on: February 6, 2024      Take 1 tablet (25 mg) by mouth once daily. Do not crush or chew. Do not start before February 6, 2024.              CONTINUE taking these medications        Instructions Last Dose Given Next Dose Due   acetaminophen 325 mg tablet  Commonly known as: Tylenol           aspirin 81 mg EC tablet           atorvastatin 80 mg tablet  Commonly known as: Lipitor           calcium carbonate 500 mg calcium (1,250 mg) tablet  Commonly known as: Oscal           guaiFENesin 100 mg/5 mL syrup  Commonly known as: Robitussin           ipratropium-albuteroL 0.5-2.5 mg/3 mL nebulizer solution  Commonly known as: Duo-Neb      Inhale the contents of 1 vial using nebulizer three times a day and every 2 hours if needed for shortness of breath for wheezing(cough., request).       ipratropium-albuteroL 0.5-2.5 mg/3 mL nebulizer solution  Commonly known as: Duo-Neb      inhale the contents of 1 vial using nebulizer three times a day and every 2 hours if needed for shortness of breath       melatonin 5 mg capsule           nitroglycerin 0.4 mg SL tablet  Commonly known as: Nitrostat           ondansetron 4 mg tablet  Commonly known as: Zofran           pantoprazole 40 mg EC tablet  Commonly known as: ProtoNix           potassium chloride CR 20 mEq ER tablet  Commonly known as: Klor-Con M20           QUEtiapine 25 mg tablet  Commonly known as: SEROquel      Take 0.5 tablets (12.5 mg) by mouth once daily at  bedtime.       Remeron 15 mg tablet  Generic drug: mirtazapine           sertraline 25 mg tablet  Commonly known as: Zoloft           tiotropium 2.5 mcg/actuation inhaler  Commonly known as: Spiriva Respimat      Inhale 2 puffs once daily. Do not start before October 13, 2023.       traMADol 50 mg tablet  Commonly known as: Ultram                  STOP taking these medications      amLODIPine 10 mg tablet  Commonly known as: Norvasc        amLODIPine 2.5 mg tablet  Commonly known as: Norvasc                  Where to Get Your Medications        Information about where to get these medications is not yet available    Ask your nurse or doctor about these medications  ferrous sulfate (325 mg ferrous sulfate) tablet  metoprolol succinate XL 25 mg 24 hr tablet         Test Results Pending At Discharge  Pending Labs       Order Current Status    Urine culture In process            Hospital Course   From [facility]:    Christie Arias is a 68 y.o. 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  She states she was in her room at around 4 PM yesterday relaxing in her wheelchair when she suddenly developed left-sided chest pain associated with nausea but without shortness of breath, diaphoresis, palpitations, lightheadedness, or vomiting.  Staff gave her sublingual nitroglycerin and the pain subsided.  However, within an hour, she reportedly had a seizure that was witnessed by staff at the [facility].  Patient admits to bladder incontinence but no tongue biting.  She denies prior history of seizures.  Staff were concerned and thus transferred her to the ED.  Her labs on arrival were notable for a K of 2.9 and a D-dimer of 4821.  CT head was basically unremarkable and chest CT was negative for PE     Evaluated by neuro who was not concerned for seizure, no need for AEDs, EEG cancelled. MRI brain without acute  findings.  Also seen by cardiology who did not feel that she was a candidate for invasive cardiac procedures and cleared her for discharge Norvasc stopped, started on BB.  She was started on oral iron and potassium was replaced.  Recommend repeating labs in 1 week.      On day of discharge, patient denied CP, SOB, n/v/diarrhea/constipation, was tolerating diet.  Patient appeared hemodynamically stable at time of discharge. Discussed with attending physician who agreed with discharge plan.    Pertinent Physical Exam At Time of Discharge  Physical Exam  Gen: NAD, cachectic  Eyes:  EOM intact  ENT: MMM  Neck: No JVD  Respiratory: CTAB, no wheezes/rhonchi  Cardiac: RRR, no murmurs rubs or gallops  Abdomen: soft, NT, +BS  Extremities: no edema or cyanosis  Neuro: No acute focal deficits, chronic left sided facial droop and mild left sided weakness  Psych:  appropriate mood and behavior  Outpatient Follow-Up  No future appointments.  Attending at [facility]  Neuro in 3 weeks    Chayito Perry PA-C

## 2024-02-05 NOTE — H&P
History Of Present Illness  Christie Arias is a 68 y.o. 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  She states she was in her room at around 4 PM yesterday relaxing in her wheelchair when she suddenly developed left-sided chest pain associated with nausea but without shortness of breath, diaphoresis, palpitations, lightheadedness, or vomiting.  Staff gave her sublingual nitroglycerin and the pain subsided.  However, within an hour, she reportedly had a seizure that was witnessed by staff at the [facility].  Patient admits to bladder incontinence but no tongue biting.  She denies prior history of seizures.  Staff were concerned and thus transferred her to the ED.  Her labs on arrival were notable for a K of 2.9 and a D-dimer of 4821.  CT head was basically unremarkable and chest CT was negative for PE     Past Medical History  Protein calorie malnutrition   Vitamin D deficiency   Osteoarthritis   Major depressive disorder   Anxiety disorder  Dementia   Cognitive communication deficit   COPD   Anemia   Gastrointestinal bleeding   CVA with residual left facial drooping and left hemiparesis  Past Medical History:   Diagnosis Date    Personal history of other diseases of the digestive system     History of diverticulitis of colon       Past Surgical History  Past Surgical History:   Procedure Laterality Date    CT ABDOMEN PELVIS ANGIOGRAM W AND/OR WO IV CONTRAST  4/12/2023    CT ABDOMEN PELVIS ANGIOGRAM W AND/OR WO IV CONTRAST GE[facility] CT    OTHER SURGICAL HISTORY  07/22/2022    Colectomy    OTHER SURGICAL HISTORY  07/22/2022    Hysterectomy    OTHER SURGICAL HISTORY  07/22/2022    Femorofemoral bypass        Social History  She reports that she has never smoked. She has never used smokeless tobacco. No history on file for alcohol use and drug use.    Family History  Stroke: yes       Allergies  Codeine    Review of Systems   Constitutional: Negative.    HENT: Negative.     Eyes: Negative.    Respiratory: Negative.     Cardiovascular:  Positive for chest pain.        See HPI   Gastrointestinal:  Positive for nausea.   Endocrine: Negative.    Genitourinary: Negative.    Musculoskeletal: Negative.    Skin: Negative.    Allergic/Immunologic: Negative.    Neurological: Negative.    Hematological: Negative.    Psychiatric/Behavioral: Negative.          Physical Exam  Constitutional:       General: She is not in acute distress.     Appearance: She is not toxic-appearing or diaphoretic.      Comments: Cachectic appearing elderly female   HENT:      Head: Normocephalic and atraumatic.      Nose: Nose normal.      Mouth/Throat:      Mouth: Mucous membranes are moist.      Pharynx: Oropharynx is clear. No oropharyngeal exudate or posterior oropharyngeal erythema.   Eyes:      General: No scleral icterus.        Right eye: No discharge.         Left eye: No discharge.      Conjunctiva/sclera: Conjunctivae normal.   Cardiovascular:      Rate and Rhythm: Normal rate and regular rhythm.      Heart sounds: No murmur heard.  Pulmonary:      Breath sounds: Normal breath sounds. No wheezing, rhonchi or rales.   Abdominal:      Palpations: Abdomen is soft. There is no mass.      Tenderness: There is no abdominal tenderness. There is no right CVA tenderness or left CVA tenderness.   Musculoskeletal:      Cervical back: Neck supple.      Right lower leg: No edema.      Left lower leg: No edema.   Lymphadenopathy:      Cervical: No cervical adenopathy.   Skin:     General: Skin is warm and dry.   Neurological:      Mental Status: She is alert and oriented to person, place, and time.      Motor: Weakness present.      Comments: Mild left facial droop (old) and mild left sided hemiparesis   Psychiatric:         Mood and Affect: Mood normal.         Behavior: Behavior normal.          Last Recorded Vitals  Blood  "pressure (!) 130/95, pulse 87, temperature 36.8 °C (98.2 °F), temperature source Temporal, resp. rate (!) 21, height 1.753 m (5' 9\"), weight (!) 42.6 kg (94 lb), SpO2 95 %.    Relevant Results   Latest Reference Range & Units 02/04/24 20:02 02/04/24 20:11 02/04/24 21:26 02/04/24 22:24   GLUCOSE 74 - 99 mg/dL 137 (H)      SODIUM 136 - 145 mmol/L 136      POTASSIUM 3.5 - 5.3 mmol/L 2.9 (LL)      CHLORIDE 98 - 107 mmol/L 94 (L)      Bicarbonate 21 - 32 mmol/L 26      Anion Gap 10 - 20 mmol/L 19      Blood Urea Nitrogen 6 - 23 mg/dL 21      Creatinine 0.50 - 1.05 mg/dL 0.83      EGFR >60 mL/min/1.73m*2 76      Calcium 8.6 - 10.3 mg/dL 9.3      MAGNESIUM 1.60 - 2.40 mg/dL 1.69      Lactate 0.4 - 2.0 mmol/L 0.8      BNP 0 - 99 pg/mL 866 (H)      Troponin I, High Sensitivity 0 - 13 ng/L 26 (H)  26 (H)    D-Dimer Non VTE, Quant (ng/mL FEU) <=500 ng/mL FEU 4,821 (H)      WBC 4.4 - 11.3 x10*3/uL 10.2      nRBC 0.0 - 0.0 /100 WBCs 0.0      RBC 4.00 - 5.20 x10*6/uL 4.17      HEMOGLOBIN 12.0 - 16.0 g/dL 10.1 (L)      HEMATOCRIT 36.0 - 46.0 % 31.5 (L)      MCV 80 - 100 fL 76 (L)      MCH 26.0 - 34.0 pg 24.2 (L)      MCHC 32.0 - 36.0 g/dL 32.1      RED CELL DISTRIBUTION WIDTH 11.5 - 14.5 % 15.8 (H)      Platelets 150 - 450 x10*3/uL 475 (H)      Neutrophils % 40.0 - 80.0 % 73.9      Immature Granulocytes %, Automated 0.0 - 0.9 % 0.4      Lymphocytes % 13.0 - 44.0 % 15.3      Monocytes % 2.0 - 10.0 % 7.2      Eosinophils % 0.0 - 6.0 % 2.2      Basophils % 0.0 - 2.0 % 1.0      Neutrophils Absolute 1.20 - 7.70 x10*3/uL 7.52      Immature Granulocytes Absolute, Automated 0.00 - 0.70 x10*3/uL 0.04      Lymphocytes Absolute 1.20 - 4.80 x10*3/uL 1.55      Monocytes Absolute 0.10 - 1.00 x10*3/uL 0.73      Eosinophils Absolute 0.00 - 0.70 x10*3/uL 0.22      Basophils Absolute 0.00 - 0.10 x10*3/uL 0.10      Flu A Result Not Detected   Not Detected     Flu B Result Not Detected   Not Detected     Coronavirus 2019, PCR Not Detected   Not " Detected     Color, Urine Straw, Yellow     Yellow   Appearance, Urine Clear     Clear   Specific Gravity, Urine 1.005 - 1.035     1.014   pH, Urine 5.0, 5.5, 6.0, 6.5, 7.0, 7.5, 8.0     8.0   Protein, Urine NEGATIVE mg/dL    NEGATIVE   Glucose, Urine NEGATIVE mg/dL    NEGATIVE   Blood, Urine NEGATIVE     NEGATIVE   Ketones, Urine NEGATIVE mg/dL    NEGATIVE   Bilirubin, Urine NEGATIVE     NEGATIVE   Urobilinogen, Urine <2.0 mg/dL    <2.0   Nitrite, Urine NEGATIVE     NEGATIVE   Leukocyte Esterase, Urine NEGATIVE     MODERATE (2+) !   Amphetamine Screen, Urine Presumptive Negative     Presumptive Negative   Barbiturate Screen, Urine Presumptive Negative     Presumptive Negative   PCP Screen, Urine Presumptive Negative     Presumptive Negative   Opiate Screen, Urine Presumptive Negative     Presumptive Negative   Cannabinoid Screen, Urine Presumptive Negative     Presumptive Negative   Fentanyl Screen, Urine Presumptive Negative     Presumptive Negative   Oxycodone Screen, Urine Presumptive Negative     Presumptive Negative   Benzodiazepines Screen, Urine Presumptive Negative     Presumptive Negative   Cocaine Metabolite Screen, Urine Presumptive Negative     Presumptive Negative   Bacteria, Urine NONE SEEN /HPF    1+ !   RBC, Urine NONE, 1-2, 3-5 /HPF    NONE   WBC, Urine 1-5, NONE /HPF    1-5   (LL): Data is critically low  (H): Data is abnormally high  (L): Data is abnormally low  !: Data is abnormal     Assessment/Plan   Seizure  New onset  Observation to medical floor  Seizure precautions  EEG  Neurology consulted    Chest pain  Telemetry  Serial cardiac markers and EKG  Antiplatelet and HI statin therapy  Cardiology consulted    Hypokalemia  Replace and recheck level in am    Thoracic aortic aneurysm  Currently measuring 4.5 x 4.4 cm  Follow up US/CT with PCP as outpt    Cachexia/malnourished state  Encouraged increased po intake  Will ask nutrition to see    Microcytic anemia  Chronic and appears to be due to  iron deficiency  Iron studies and iron supplementation.     Code status  Discussed  She wishes to be a full code in the event of a cardiopulmonary arrest      I spent 60 minutes in the professional and overall care of this patient.      Dayna Gray MD

## 2024-02-05 NOTE — NURSING NOTE
0700 Care assumed bedside report complete.  Patient sitting in bed NAD aqwaiting Neuro, card cx expected DC today  0942 completed MRI formwith patient and son Dmitry via phone both paties deny any implants or meal fragments.    Per MRI patient has a implant.  MRI bnotioifiedf of discrepencey.      1221 patient to MRI    1813 report caled to [facility]

## 2024-02-05 NOTE — CONSULTS
Inpatient consult to Neurology  Consult performed by: Campos Gamble DO  Consult ordered by: Dayna Gray MD        Objective:     History Of Present Illness  Christie Arias is a 68 y.o. female with a PMH of CVA with residual left sided weakness, PAD, HTN, who presented from [place] with a chief complain of chest pain and seizure.  While at [facility] she received sublingual nitroglycerin for chest pain and was reported to experience nausea seizure that was unwitnessed by staff.  Patient reports experiencing bowel and bladder incontinence but denies postictal state or tongue biting.  Will attempt to reach out to SNF for clarification.     Past Medical History:  Past Medical History:   Diagnosis Date    Personal history of other diseases of the digestive system     History of diverticulitis of colon      Past Surgical History:  Past Surgical History:   Procedure Laterality Date    CT ABDOMEN PELVIS ANGIOGRAM W AND/OR WO IV CONTRAST  4/12/2023    CT ABDOMEN PELVIS ANGIOGRAM W AND/OR WO IV CONTRAST [facility] CT    OTHER SURGICAL HISTORY  07/22/2022    Colectomy    OTHER SURGICAL HISTORY  07/22/2022    Hysterectomy    OTHER SURGICAL HISTORY  07/22/2022    Femorofemoral bypass      Social History:  Social History     Tobacco Use    Smoking status: Never    Smokeless tobacco: Never     Allergies:  Codeine  Medications Prior to Admission   Medication Sig Dispense Refill Last Dose    acetaminophen (Tylenol) 325 mg tablet Take 1 tablet (325 mg) by mouth every 6 hours if needed for mild pain (1 - 3).   Unknown    amLODIPine (Norvasc) 10 mg tablet Take 1 tablet (10 mg) by mouth once daily.   2/4/2024    aspirin 81 mg EC tablet Take 1 tablet (81 mg) by mouth twice a day.   2/4/2024    atorvastatin (Lipitor) 80 mg tablet Take 1 tablet (80 mg) by mouth once daily at bedtime.   2/4/2024    calcium carbonate (Oscal) 500 mg calcium (1,250 mg) tablet Take 1 tablet (1,250 mg) by mouth every 12 hours.   2/4/2024     guaiFENesin (Robitussin) 100 mg/5 mL syrup Take 5 mL (100 mg) by mouth 4 times a day as needed for cough.       ipratropium-albuteroL (Duo-Neb) 0.5-2.5 mg/3 mL nebulizer solution Inhale the contents of 1 vial using nebulizer three times a day and every 2 hours if needed for shortness of breath for wheezing(cough., request). 180 mL 11 Unknown    ipratropium-albuteroL (Duo-Neb) 0.5-2.5 mg/3 mL nebulizer solution inhale the contents of 1 vial using nebulizer three times a day and every 2 hours if needed for shortness of breath 270 mL 0     melatonin 5 mg capsule Take 1 capsule (5 mg) by mouth once daily at bedtime.   2/4/2024    mirtazapine (Remeron) 15 mg tablet Take 1 tablet (15 mg) by mouth once every 24 hours.   2/4/2024    nitroglycerin (Nitrostat) 0.4 mg SL tablet Place 1 tablet (0.4 mg) under the tongue every 5 minutes if needed for chest pain.   Unknown    ondansetron (Zofran) 4 mg tablet Take 1 tablet (4 mg) by mouth every 6 hours if needed for nausea or vomiting.   2/4/2024    pantoprazole (ProtoNix) 40 mg EC tablet Take 1 tablet (40 mg) by mouth.   2/4/2024    potassium chloride CR 20 mEq ER tablet Take 1 tablet (20 mEq) by mouth once daily. Do not crush or chew.   2/4/2024    QUEtiapine (SEROquel) 25 mg tablet Take 0.5 tablets (12.5 mg) by mouth once daily at bedtime. 15 tablet 0     sertraline (Zoloft) 25 mg tablet Take 1 tablet (25 mg) by mouth once daily.   2/4/2024    tiotropium (Spiriva Respimat) 2.5 mcg/actuation inhaler Inhale 2 puffs once daily. Do not start before October 13, 2023. 9 g 0 2/4/2024    traMADol (Ultram) 50 mg tablet Take 1 tablet (50 mg) by mouth every 6 hours if needed for moderate pain (4 - 6).   Unknown     Review of Systems   Constitutional:  Positive for fatigue. Negative for chills and fever.   Eyes:  Negative for visual disturbance.   Respiratory:  Negative for cough, shortness of breath and wheezing.    Cardiovascular:  Negative for chest pain, palpitations and leg swelling.  "  Gastrointestinal:  Negative for abdominal distention, abdominal pain, diarrhea, nausea and vomiting.   Genitourinary:  Negative for difficulty urinating, dysuria, flank pain, frequency and hematuria.   Musculoskeletal:  Negative for myalgias.   Skin:  Negative for color change.   Neurological:  Positive for weakness (left sided). Negative for dizziness, light-headedness and numbness.   Psychiatric/Behavioral:  Negative for confusion.      Cardiovascular system: S1-S2 intact  Respiratory clear to auscultation bilateral on deep breathing he feels irritability on the left side of the chest.    Subjective:   Neurological Exam  Mental Status  Awake, alert and oriented to person, place and time. Speech is normal.    Cranial Nerves  CN I: Sense of smell is normal.  CN II: Visual acuity is normal. Visual fields full to confrontation.  CN III, IV, VI: Extraocular movements intact bilaterally. Normal lids and orbits bilaterally. Pupils equal round and reactive to light bilaterally.  CN V: Facial sensation is normal.  CN VII: Full and symmetric facial movement.  CN VIII: Hearing is normal.  CN IX, X: Palate elevates symmetrically. Normal gag reflex.  CN XI: Shoulder shrug strength is normal.  CN XII: Tongue midline without atrophy or fasciculations.    Motor  Decreased muscle bulk throughout. Decreased muscle tone.  Left sided weakness d/t history of right sided CVA..    Sensory  Light touch is normal in upper and lower extremities. Vibration is normal in upper and lower extremities.     Reflexes  Deep tendon reflexes are 2+ and symmetric except as noted.                                            Right                      Left  Patellar                                1+                         0  Achilles                                1+                         1+    Last Recorded Vitals  Blood pressure 123/89, pulse 73, temperature 36.4 °C (97.5 °F), temperature source Temporal, resp. rate 18, height 1.753 m (5' 9\"), " weight (!) 40.7 kg (89 lb 11.6 oz), SpO2 98 %.    Relevant Results:    Current Facility-Administered Medications:     acetaminophen (Tylenol) tablet 650 mg, 650 mg, oral, q4h PRN **OR** acetaminophen (Tylenol) oral liquid 650 mg, 650 mg, oral, q4h PRN **OR** acetaminophen (Tylenol) suppository 650 mg, 650 mg, rectal, q4h PRN, Dayna Gray MD    amLODIPine (Norvasc) tablet 10 mg, 10 mg, oral, Daily, Dayna Gray MD, 10 mg at 02/05/24 0610    aspirin EC tablet 81 mg, 81 mg, oral, Daily, Dayna Gray MD, 81 mg at 02/05/24 0824    atorvastatin (Lipitor) tablet 80 mg, 80 mg, oral, Nightly, Dayna Gray MD, 80 mg at 02/05/24 0241    calcium carbonate (Oscal) tablet 1,250 mg, 1,250 mg, oral, q12h, Dayna Gray MD, 1,250 mg at 02/05/24 0241    docusate sodium (Colace) capsule 100 mg, 100 mg, oral, BID, Dayna Gray MD, 100 mg at 02/05/24 0824    ferrous sulfate (325 mg ferrous sulfate) tablet 1 tablet, 65 mg of iron, oral, BID, Dayna Gray MD, 1 tablet at 02/05/24 0824    gadoterate meglumine (Dotarem) 0.5 mmol/mL contrast injection 8.1 mL, 0.2 mL/kg, intravenous, Once in imaging, Camille Gleason DO    guaiFENesin (Robitussin) 100 mg/5 mL syrup 100 mg, 100 mg, oral, 4x daily PRN, Dayna Gray MD    heparin (porcine) injection 5,000 Units, 5,000 Units, subcutaneous, q8h NICOLE, Dayna Gray MD, 5,000 Units at 02/05/24 1033    melatonin tablet 5 mg, 5 mg, oral, Nightly, aDyna Gray MD    mirtazapine (Remeron) tablet 15 mg, 15 mg, oral, q24h, Dayna Gray MD, 15 mg at 02/05/24 0241    nitroglycerin (Nitrostat) SL tablet 0.4 mg, 0.4 mg, sublingual, q5 min PRN, Dayna Gray MD    oxygen (O2) therapy, , inhalation, Continuous PRN - O2/gases, Dayna Gray MD    pantoprazole (ProtoNix) EC tablet 40 mg, 40 mg, oral, Daily before breakfast, Dayna Gray MD, 40 mg at 02/05/24 0610    potassium chloride CR (Klor-Con M20) ER tablet 20 mEq, 20 mEq,  oral, Daily, Dayna Gray MD, 20 mEq at 02/05/24 0824    sertraline (Zoloft) tablet 25 mg, 25 mg, oral, Daily, Dayna Gray MD, 25 mg at 02/05/24 0823    traMADol (Ultram) tablet 50 mg, 50 mg, oral, q6h PRN, Dayna Gray MD, 50 mg at 02/05/24 0254      I have personally reviewed the following imaging for brain (CT/ MR) and results and discussed in detail with the patient/care giver/family     ECG 12 Lead    Result Date: 2/5/2024  Normal sinus rhythm Moderate voltage criteria for LVH, may be normal variant ( Sokolow-Pulido , Robbie product ) ST & T wave abnormality, consider inferior ischemia Abnormal ECG When compared with ECG of 09-OCT-2023 15:46, (unconfirmed) T wave inversion no longer evident in Anterior leads QT has shortened    ECG 12 lead    Result Date: 2/5/2024  Suspect arm lead reversal, interpretation assumes no reversal Normal sinus rhythm Rightward axis Moderate voltage criteria for LVH, may be normal variant ST & T wave abnormality, consider inferolateral ischemia Abnormal ECG When compared with ECG of 04-FEB-2024 19:55, (unconfirmed) T wave inversion more evident in Lateral leads    CT angio chest abdomen pelvis    Result Date: 2/4/2024  Interpreted By:  Amanda Bettencourt, STUDY: CT ANGIO CHEST ABDOMEN PELVIS;  2/4/2024 9:17 pm   INDICATION: Signs/Symptoms:concern for dissection   COMPARISON: CT angiogram chest, abdomen, pelvis 08/15/2023. CT angiogram chest 10/02/2023. CT chest, abdomen, pelvis 07/29/2023. CT angiogram head and neck 07/29/2023.   ACCESSION NUMBER(S): [id number]   ORDERING CLINICIAN: AZUL TERESA   TECHNIQUE: Noncontrast axial CT images of the chest, abdomen and pelvis were obtained prior to intravenous contrast administration. Axial CT images of the chest, abdomen and pelvis were obtained after the uneventful administration of intravenous contrast material using CT angiographic technique. Coronal and sagittal images are reconstructed.  3-D and MIP reconstructions  were performed on an independent workstation and provided for review.   FINDINGS: NON-CONTRAST IMAGING:   There is motion artifact. Atherosclerotic calcifications are noted at the thoracic aorta. No hyperdense intramural thrombus. Coronary artery calcifications are noted. Atherosclerotic calcifications are noted at the abdominal aorta and its branches. Status post aorto bi-iliac bypass graft. No obstructing ureteral calculi.     POST-CONTRAST IMAGING:   There is motion artifact.   VASCULAR: Redemonstration of fusiform aneurysmal dilation of the distal descending thoracic aorta measuring 4.5 x 4.4 cm, previously measured 4.4 x 4.3 cm. No acute dissection. Calcified and noncalcified plaque of the thoracic aorta. Redemonstration of 0.6 cm outpouching with white neck and contrast opacification along the lesser curvature of the aortic arch with adjacent calcified ligament, not significantly changed in the interval. 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   No abdominal aortic aneurysm or acute dissection. Calcified and noncalcified plaque of the abdominal aorta and its branches. 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 Calcified and noncalcified plaque at the SMA. Calcifications with severe stenosis at the origins of the bilateral renal arteries. There is a small caliber of right renal artery. There is an aorto bi-iliac bypass graft, patent.     CHEST:   HEART: Normal size. No pericardial effusion. Coronary artery calcifications are noted. MEDIASTINUM AND KIMBERLY: No pathologically enlarged thoracic lymph nodes. LUNG, PLEURA, LARGE AIRWAYS: The trachea and the mainstem bronchi are patent. The evaluation of the lungs is degraded by motion artifact. There are bilateral emphysematous changes. There is mild bibasilar atelectasis. There is a 1.0 cm nodularity at the left lower lobe extending to the pleura (series 202, axial image 211). No focal  consolidation, pleural effusion or pneumothorax CHEST WALL AND LOWER NECK: Within normal limits. There are remote rib fractures.   ABDOMEN:   BONES: There is diffuse osteopenia. Multilevel degenerative at the proximal left femur. Multilevel degenerative changes at the spine. ABDOMINAL WALL: Postsurgical changes at the anterior abdominal wall.   LIVER: Unremarkable. BILE DUCTS: No significant dilation. GALLBLADDER: Present. PANCREAS: No peripancreatic soft tissue stranding. SPLEEN: Unremarkable. ADRENALS:  Unremarkable. KIDNEYS: There is cortical atrophy of the right kidney with decreased enhancement. No hydronephrosis. URETERS: No hydroureter.   PELVIS:   REPRODUCTIVE ORGANS: The uterus is surgically absent. BLADDER: Distended.   RETROPERITONEUM: No retroperitoneal hemorrhage. BOWEL: No bowel obstruction. Postsurgical changes are noted at the bowel. There is colonic diverticulosis with no CT evidence for acute diverticulitis. PERITONEUM: No ascites or free air. No pathologically enlarged lymph nodes are identified.       1. 4.5 x 4.4 cm distal descending thoracic aortic aneurysm. No acute dissection. Redemonstration of 0.6 cm focal outpouching along the inferior aspect of the aortic arch adjacent to a calcified ligament. 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 2. Severe atherosclerotic disease. Patent aorto bi-iliac bypass graft. 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 Severe stenosis at the origins of the bilateral renal arteries. Small caliber right renal artery with cortical atrophy and decreased enhancement of the right kidney. 3. Emphysema. 1.0 cm nodularity at the left lower lobe extending to the pleura, may be secondary to atelectasis or scarring. CT chest in 3 months recommended to re-evaluate and exclude underlying neoplasm. 4. Colonic diverticulosis.       MACRO: Critical Finding:  See findings. Notification was initiated on  2/4/2024 at 11:47 pm by  Amadna Bettencourt.  (**-YCF-**) Instructions:   Signed by: Amanda Bettencourt 2/4/2024 11:54 PM Dictation workstation:   [id number]    CT head wo IV contrast    Result Date: 2/4/2024  Interpreted By:  Amanda Bettencourt, STUDY: CT HEAD WO IV CONTRAST  2/4/2024 9:17 pm   INDICATION: Signs/Symptoms:new onset seizure   COMPARISON: CT head 07/29/2023, 04/12/2023.   ACCESSION NUMBER(S): [id number]   ORDERING CLINICIAN: AZUL TERESA   TECHNIQUE: Serial, axial CT images of the brain were obtained without IV contrast. Coronal and sagittal reformatted images were performed.   FINDINGS: The ventricles, cisterns and sulci are prominent, consistent with diffuse volume loss. There is ex vacuo dilation of the frontal horn of the right lateral ventricle. There are areas of nonspecific white matter hypodensity, which are probably age-related or microvascular in nature. Redemonstration of remote infarcts at the head of the right caudate nucleus, right lentiform nucleus, right frontal lobe and left cerebellum. The gray-white matter differentiation is intact and there is no evidence of acute territorial infarct.  No mass effect or midline shift is seen.  There is no hemorrhage.  No extraaxial fluid collection. No air-fluid levels at the visualized paranasal sinuses. The mastoid air cells are clear. No depressed calvarial fracture.       1.  No acute intracranial hemorrhage or acute territorial infarct.     MACRO: None.   Signed by: Amanda Bettencourt 2/4/2024 11:26 PM Dictation workstation:   [id number]    XR chest 1 view    Result Date: 2/4/2024  Interpreted By:  Marilou Levy, STUDY: Chest, single AP view.   INDICATION: Signs/Symptoms:chest pain.   COMPARISON: Chest radiograph dated 10/11/2023. CT chest dated 10/02/2023.   ACCESSION NUMBER(S): [id number]   ORDERING CLINICIAN: AZUL TERESA   FINDINGS: The cardiac silhouette size is within upper normal limits. Atherosclerosis of the thoracic aorta noted.  There is no focal consolidation, edema or pneumothorax. No sizeable pleural effusion. No acute osseous abnormality. Hyperinflated lungs with flattened hemidiaphragms compatible with emphysema.       1. No acute cardiopulmonary process. 2. Findings of emphysema.   MACRO: None.   Signed by: Marilou Levy 2/4/2024 8:35 PM Dictation workstation:   [id number]       Imaging Brain/Head:  No results found for this or any previous visit.    Imaging Cervical:  No results found for this or any previous visit.    Imaging Thoracic:  No results found for this or any previous visit.    Imaging Lumbar:  No results found for this or any previous visit.        Relevant Results         [unfilled]  [unfilled]  [unfilled]    Assessment & Plan:     Assessment/Plan   Christie Arias is a 68 y.o. female with a PMH of CVA with residual left sided weakness, PAD, HTN, who presented from [place] with a chief complain of chest pain and seizure.      #Seizure-like Activity:  - clenching with fine tremor lasting 2-3 minutes, awake and alert after an episode of seizure but was not aware she had a seizure, additionally reported nausea along with bladder and bowel incontinence.  - per SNF nursing, she received Nitroglycerin for chest pain prior to seizure and Zofran after seizure.  - reviewed CT head and angio, overall was unremarkable besides old signs of stroke.  - her seizure-like activity could be due to nitroglycerin-induced hypotension  - continue current medical management  - follow up on MRI brain wo contrast  - will order Holter monitor   - will not need any anti-epilepsy management at this time    Campos Gamble DO, PGY-2  Internal Medicine     Patient/Family Education: Extensive time was spent educating the patient on relevant anatomy, clinical findings and imaging, as well as discussing the potential diagnoses as discussed above.  Pharmacology: as above. Exercise: I discussed the importance of maintaining  a daily exercise program, including stretching and strengthening. Preventative strategies were reviewed, specifically avoidance of any exercises that exacerbate pain.Return to online virtual visit/ clinic visit for follow-up with MIK Taylor in 2 weeks or sooner as needed.The patient expressed understanding and agreement with the assessment and plan.  Patient encouraged to contact us should they have any questions, concerns, or any changes in symptoms. Thank you for allowing me to participate in the care of your patient.** This note is created using speech recognition transcription software. Despite proofreading, several typographical errors might be present that might affect the meaning of the content. Please call with any questions.

## 2024-02-05 NOTE — CONSULTS
Inpatient consult to Cardiology  Consult performed by: Stacy Frias, ROSALINDA-CNP  Consult ordered by: Dayna Gray MD  Reason for consult: chest pain, elevated troponin          History Of Present Illness  Christie Arias is a 68 y.o. female presenting with left sided chest pain. She is wheelchair/bedbound and states she was just sitting there when she developed the chest pain. She got Nitroglycerin SL and then according to the medical records had a seizure with loss of bowel and bladder. The pt does not recall this.     Lab work in the ER showed glucose 137, sodium 136, potassium 2.9, BUN/Cr 21/0.83, magnesium 1.69, lactate 0.8, , troponin 26, d dimer 4821, WBC 10.2, H&H 10.1/31.5, CXR showed emphysema, CTA of the chest/abd/pelvis showed a thoracic aneurysm, severe atherosclerotic changes, emphysema, lung nodules.     EKG showed SR, slight ST depression to anterolateral leads, unchanged from previous.       Past Medical History  Past Medical History:   Diagnosis Date    Personal history of other diseases of the digestive system     History of diverticulitis of colon   CVA with left sided weakness, PAD, HTN, dementia, anxiety, anemia, GI bleed, COPD, vit D deficiency     Surgical History  Past Surgical History:   Procedure Laterality Date    CT ABDOMEN PELVIS ANGIOGRAM W AND/OR WO IV CONTRAST  4/12/2023    CT ABDOMEN PELVIS ANGIOGRAM W AND/OR WO IV CONTRAST [facility] CT    OTHER SURGICAL HISTORY  07/22/2022    Colectomy    OTHER SURGICAL HISTORY  07/22/2022    Hysterectomy    OTHER SURGICAL HISTORY  07/22/2022    Femorofemoral bypass        Social History  She reports that she has never smoked. She has never used smokeless tobacco. No history on file for alcohol use and drug use.    Family History  No family history on file.     Allergies  Codeine    Review of Systems  Review of Systems   Constitutional:  Negative for chills and fever.   Respiratory:  Negative for cough and shortness of breath.   "  Cardiovascular:  Positive for chest pain. Negative for palpitations and leg swelling.   Gastrointestinal:  Positive for nausea. Negative for abdominal pain.   Neurological:  Positive for weakness. Negative for dizziness.   All other systems reviewed and are negative.         Physical Exam  Constitutional: Thin, awake/alert/oriented x2-3, no distress, alert and cooperative  Eyes: PERRL, EOMI, clear sclera  ENMT: mucous membranes moist, no apparent injury, no lesions seen  Head/Neck: Neck supple, no apparent injury, thyroid without mass or tenderness, No JVD, trachea midline, no bruits  Respiratory/Thorax: Patent airways, CTAB, normal breath sounds with good chest expansion, thorax symmetric  Cardiovascular: Regular, rate and rhythm, no murmurs, 2+ equal pulses of the extremities, normal S 1and S 2  Gastrointestinal: Nondistended, soft, non-tender, no rebound tenderness or guarding, no masses palpable, no organomegaly, +BS, no bruits  Musculoskeletal: left sided weak  Extremities: no edema  Neurological: alert and oriented x2-3  Lymphatic: No significant lymphadenopathy  Psychological: Appropriate mood and behavior  Skin: Warm and dry, no lesions, no rashes       Last Recorded Vitals  Blood pressure 93/67, pulse 78, temperature 36.4 °C (97.5 °F), temperature source Temporal, resp. rate 18, height 1.753 m (5' 9\"), weight (!) 40.7 kg (89 lb 11.6 oz), SpO2 92 %.    Relevant Results    Scheduled medications  amLODIPine, 10 mg, oral, Daily  aspirin, 81 mg, oral, Daily  atorvastatin, 80 mg, oral, Nightly  calcium carbonate, 1,250 mg, oral, q12h  docusate sodium, 100 mg, oral, BID  ferrous sulfate (325 mg ferrous sulfate), 65 mg of iron, oral, BID  gadoterate meglumine, 0.2 mL/kg, intravenous, Once in imaging  heparin (porcine), 5,000 Units, subcutaneous, q8h NICOLE  melatonin, 5 mg, oral, Nightly  mirtazapine, 15 mg, oral, q24h  pantoprazole, 40 mg, oral, Daily before breakfast  potassium chloride CR, 20 mEq, oral, " Daily  sertraline, 25 mg, oral, Daily      Continuous medications     PRN medications  PRN medications: acetaminophen **OR** acetaminophen **OR** acetaminophen, guaiFENesin, nitroglycerin, oxygen, traMADol    Results for orders placed or performed during the hospital encounter of 02/04/24 (from the past 24 hour(s))   CBC and Auto Differential   Result Value Ref Range    WBC 10.2 4.4 - 11.3 x10*3/uL    nRBC 0.0 0.0 - 0.0 /100 WBCs    RBC 4.17 4.00 - 5.20 x10*6/uL    Hemoglobin 10.1 (L) 12.0 - 16.0 g/dL    Hematocrit 31.5 (L) 36.0 - 46.0 %    MCV 76 (L) 80 - 100 fL    MCH 24.2 (L) 26.0 - 34.0 pg    MCHC 32.1 32.0 - 36.0 g/dL    RDW 15.8 (H) 11.5 - 14.5 %    Platelets 475 (H) 150 - 450 x10*3/uL    Neutrophils % 73.9 40.0 - 80.0 %    Immature Granulocytes %, Automated 0.4 0.0 - 0.9 %    Lymphocytes % 15.3 13.0 - 44.0 %    Monocytes % 7.2 2.0 - 10.0 %    Eosinophils % 2.2 0.0 - 6.0 %    Basophils % 1.0 0.0 - 2.0 %    Neutrophils Absolute 7.52 1.20 - 7.70 x10*3/uL    Immature Granulocytes Absolute, Automated 0.04 0.00 - 0.70 x10*3/uL    Lymphocytes Absolute 1.55 1.20 - 4.80 x10*3/uL    Monocytes Absolute 0.73 0.10 - 1.00 x10*3/uL    Eosinophils Absolute 0.22 0.00 - 0.70 x10*3/uL    Basophils Absolute 0.10 0.00 - 0.10 x10*3/uL   Basic Metabolic Panel   Result Value Ref Range    Glucose 137 (H) 74 - 99 mg/dL    Sodium 136 136 - 145 mmol/L    Potassium 2.9 (LL) 3.5 - 5.3 mmol/L    Chloride 94 (L) 98 - 107 mmol/L    Bicarbonate 26 21 - 32 mmol/L    Anion Gap 19 10 - 20 mmol/L    Urea Nitrogen 21 6 - 23 mg/dL    Creatinine 0.83 0.50 - 1.05 mg/dL    eGFR 76 >60 mL/min/1.73m*2    Calcium 9.3 8.6 - 10.3 mg/dL   Lactate   Result Value Ref Range    Lactate 0.8 0.4 - 2.0 mmol/L   B-Type Natriuretic Peptide   Result Value Ref Range     (H) 0 - 99 pg/mL   D-dimer, quantitative   Result Value Ref Range    D-Dimer Non VTE, Quant (ng/mL FEU) 4,821 (H) <=500 ng/mL FEU   Troponin I, High Sensitivity, Initial   Result Value Ref Range     Troponin I, High Sensitivity 26 (H) 0 - 13 ng/L   Magnesium   Result Value Ref Range    Magnesium 1.69 1.60 - 2.40 mg/dL   Prolactin   Result Value Ref Range    Prolactin 11.4 3.0 - 20.0 ug/L   Influenza A, and B PCR   Result Value Ref Range    Flu A Result Not Detected Not Detected    Flu B Result Not Detected Not Detected   Sars-CoV-2 PCR, Symptomatic   Result Value Ref Range    Coronavirus 2019, PCR Not Detected Not Detected   ECG 12 Lead   Result Value Ref Range    Ventricular Rate 95 BPM    Atrial Rate 95 BPM    [place] Interval 146 ms    QRS Duration 104 ms    QT Interval 344 ms    QTC Calculation(Bazett) 432 ms    P Axis 73 degrees    R Axis 43 degrees    T Axis -30 degrees    QRS Count 15 beats    Q Onset 216 ms    P Onset 143 ms    P Offset 196 ms    T Offset 388 ms    QTC Fredericia 400 ms   Troponin, High Sensitivity, 1 Hour   Result Value Ref Range    Troponin I, High Sensitivity 26 (H) 0 - 13 ng/L   DRUG SCREEN,URINE   Result Value Ref Range    Amphetamine Screen, Urine Presumptive Negative Presumptive Negative    Barbiturate Screen, Urine Presumptive Negative Presumptive Negative    Benzodiazepines Screen, Urine Presumptive Negative Presumptive Negative    Cannabinoid Screen, Urine Presumptive Negative Presumptive Negative    Cocaine Metabolite Screen, Urine Presumptive Negative Presumptive Negative    Fentanyl Screen, Urine Presumptive Negative Presumptive Negative    Opiate Screen, Urine Presumptive Negative Presumptive Negative    Oxycodone Screen, Urine Presumptive Negative Presumptive Negative    PCP Screen, Urine Presumptive Negative Presumptive Negative   Urinalysis with Reflex Microscopic   Result Value Ref Range    Color, Urine Yellow Straw, Yellow    Appearance, Urine Clear Clear    Specific Gravity, Urine 1.014 1.005 - 1.035    pH, Urine 8.0 5.0, 5.5, 6.0, 6.5, 7.0, 7.5, 8.0    Protein, Urine NEGATIVE NEGATIVE mg/dL    Glucose, Urine NEGATIVE NEGATIVE mg/dL    Blood, Urine NEGATIVE NEGATIVE     Ketones, Urine NEGATIVE NEGATIVE mg/dL    Bilirubin, Urine NEGATIVE NEGATIVE    Urobilinogen, Urine <2.0 <2.0 mg/dL    Nitrite, Urine NEGATIVE NEGATIVE    Leukocyte Esterase, Urine MODERATE (2+) (A) NEGATIVE   Microscopic Only, Urine   Result Value Ref Range    WBC, Urine 1-5 1-5, NONE /HPF    RBC, Urine NONE NONE, 1-2, 3-5 /HPF    Bacteria, Urine 1+ (A) NONE SEEN /HPF   CBC   Result Value Ref Range    WBC 8.5 4.4 - 11.3 x10*3/uL    nRBC 0.0 0.0 - 0.0 /100 WBCs    RBC 3.85 (L) 4.00 - 5.20 x10*6/uL    Hemoglobin 9.1 (L) 12.0 - 16.0 g/dL    Hematocrit 29.2 (L) 36.0 - 46.0 %    MCV 76 (L) 80 - 100 fL    MCH 23.6 (L) 26.0 - 34.0 pg    MCHC 31.2 (L) 32.0 - 36.0 g/dL    RDW 15.5 (H) 11.5 - 14.5 %    Platelets 454 (H) 150 - 450 x10*3/uL   Coagulation Screen   Result Value Ref Range    Protime 12.6 9.8 - 12.8 seconds    INR 1.1 0.9 - 1.1    aPTT 28 27 - 38 seconds   Basic Metabolic Panel   Result Value Ref Range    Glucose 89 74 - 99 mg/dL    Sodium 134 (L) 136 - 145 mmol/L    Potassium 3.4 (L) 3.5 - 5.3 mmol/L    Chloride 98 98 - 107 mmol/L    Bicarbonate 26 21 - 32 mmol/L    Anion Gap 13 10 - 20 mmol/L    Urea Nitrogen 15 6 - 23 mg/dL    Creatinine 0.80 0.50 - 1.05 mg/dL    eGFR 79 >60 mL/min/1.73m*2    Calcium 9.2 8.6 - 10.3 mg/dL   Lipid Panel   Result Value Ref Range    Cholesterol 137 0 - 199 mg/dL    HDL-Cholesterol 48.0 mg/dL    Cholesterol/HDL Ratio 2.9     LDL Calculated 69 <=99 mg/dL    VLDL 20 0 - 40 mg/dL    Triglycerides 100 0 - 149 mg/dL    Non HDL Cholesterol 89 0 - 149 mg/dL   TSH   Result Value Ref Range    Thyroid Stimulating Hormone 1.04 0.44 - 3.98 mIU/L   ECG 12 lead   Result Value Ref Range    Ventricular Rate 79 BPM    Atrial Rate 79 BPM    OK Interval 136 ms    QRS Duration 104 ms    QT Interval 416 ms    QTC Calculation(Bazett) 477 ms    P Axis 95 degrees    R Axis 96 degrees    T Axis -77 degrees    QRS Count 12 beats    Q Onset 216 ms    P Onset 148 ms    P Offset 201 ms    T Offset 424 ms    QTC  Fredericia 456 ms   Iron and TIBC   Result Value Ref Range    Iron 20 (L) 35 - 150 ug/dL    UIBC 371 (H) 110 - 370 ug/dL    TIBC 391 240 - 445 ug/dL    % Saturation 5 (L) 25 - 45 %   Ferritin   Result Value Ref Range    Ferritin 34 8 - 150 ng/mL   Troponin I, High Sensitivity   Result Value Ref Range    Troponin I, High Sensitivity 29 (H) 0 - 13 ng/L       MR brain wo IV contrast   Final Result   No acute intracranial abnormality.        Chronic infarcts in bilateral basal ganglia and wallerian   degeneration along the right corticospinal tract. Superimposed severe   microangiopathic disease.        Moderate diffuse parenchymal volume loss most prominently affecting   bilateral frontal lobes. A frontal predominant neurodegenerative   process such as frontotemporal dementia can not be excluded.   Correlation with history recommended.        Signed by: Marquita Pollock 2/5/2024 1:13 PM   Dictation workstation:   [id number]      CT head wo IV contrast   Final Result   1.  No acute intracranial hemorrhage or acute territorial infarct.             MACRO:   None.        Signed by: Amanda Bettencourt 2/4/2024 11:26 PM   Dictation workstation:   [id number]      CT angio chest abdomen pelvis   Final Result   1. 4.5 x 4.4 cm distal descending thoracic aortic aneurysm. No acute   dissection. Redemonstration of 0.6 cm focal outpouching along the   inferior aspect of the aortic arch adjacent to a calcified ligament.   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   2. Severe atherosclerotic disease. Patent aorto bi-iliac bypass   graft. 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 Severe stenosis at the origins of the   bilateral renal arteries. Small caliber right renal artery with   cortical atrophy and decreased enhancement of the right kidney.   3. Emphysema. 1.0 cm nodularity at the left lower lobe extending to   the pleura, may be secondary to atelectasis or  scarring. CT chest in   3 months recommended to re-evaluate and exclude underlying neoplasm.   4. Colonic diverticulosis.                  MACRO:   Critical Finding:  See findings. Notification was initiated on   2/4/2024 at 11:47 pm by  Amanda Bettencourt.  (**-YCF-**) Instructions:        Signed by: Amanda Bettencourt 2/4/2024 11:54 PM   Dictation workstation:   [id number]      XR chest 1 view   Final Result   1. No acute cardiopulmonary process.   2. Findings of emphysema.        MACRO:   None.        Signed by: Marilou Levy 2/4/2024 8:35 PM   Dictation workstation:   [id number]      Holter Or Event Cardiac Monitor    (Results Pending)       No echocardiogram results found for the past 12 months       Assessment/Plan   Echocardiogram from July 2023:  1. Left ventricular systolic function is normal with a 60% estimated ejection fraction.  2. Spectral Doppler shows an impaired relaxation pattern of left ventricular diastolic filling.      1. Elevated Troponin  -Troponin 26, 26, 29  -I reviewed the EKG, SR, slight ST depression to anterolateral leads->unchanged from previous  -CXR reviewed, showed emphysema  -d dimer elevated  -CTA of the chest/abd/pelvis showed thoracic aneurysm, severe atherosclerotic disease, emphysema  -Echo from July as above  -Cont asa, statin  -Stop amlodipine and start Toprol Xl 25mg daily  - treat medically, not a good candidate for coronary angiography->low body weight, increased risk of bleeding, poor functional status     2. Possible seizure due to hypotension from Nitro  -CT head negative  -MRI brain showed chronic CVAs  -Neuro consulted, note reviewed  -seizure precautions     3. Hypokalemia  - potassium 2.9->3.4  - replace potassium, monitor  - monitor on tele     4. Hypertension  -BP elevated on admit, now low today  -Stop amlodipine  -Start metoprolol Succinate 25mg daily  -Monitor     5. Chronic iron deficiency Anemia  -stable  -monitor     5 . Hx of CVA  - CT head negative  - No  acute CVA per MRI    Stacy Frias, APRN-CNP

## 2024-02-05 NOTE — PROGRESS NOTES
02/05/24 1324   Discharge Planning   Living Arrangements Other (Comment)  (from [facility])   Support Systems Children   Assistance Needed assist transfer to    Type of Residence Nursing home/residential care   Home or Post Acute Services Post acute facilities (Rehab/SNF/etc)   Type of Post Acute Facility Services Long term care   Patient expects to be discharged to: return to [facility]   Does the patient need discharge transport arranged? Yes   RoundTrip coordination needed? Yes   Has discharge transport been arranged? No   Financial Resource Strain   How hard is it for you to pay for the very basics like food, housing, medical care, and heating? Not hard   Housing Stability   In the last 12 months, was there a time when you were not able to pay the mortgage or rent on time? N   In the last 12 months, was there a time when you did not have a steady place to sleep or slept in a shelter (including now)? N   Transportation Needs   In the past 12 months, has lack of transportation kept you from medical appointments or from getting medications? no   In the past 12 months, has lack of transportation kept you from meetings, work, or from getting things needed for daily living? No   Patient Choice   Patient / Family choosing to utilize agency / facility established prior to hospitalization Yes     02/05/2024 1326: Referral sent to [facility], they confirmed patient is long term and can return when medically ready.     02/05/2024 1519: [facility] made aware to send final orders for patient. Transport confirmed for 2100. Patient's son, Jason, notified via phone and he is agreeable.

## 2024-02-06 LAB — BACTERIA UR CULT: ABNORMAL

## 2024-02-08 LAB
ATRIAL RATE: 95 BPM
P AXIS: 73 DEGREES
P OFFSET: 196 MS
P ONSET: 143 MS
PR INTERVAL: 146 MS
Q ONSET: 216 MS
QRS COUNT: 15 BEATS
QRS DURATION: 104 MS
QT INTERVAL: 344 MS
QTC CALCULATION(BAZETT): 432 MS
QTC FREDERICIA: 400 MS
R AXIS: 43 DEGREES
T AXIS: -30 DEGREES
T OFFSET: 388 MS
VENTRICULAR RATE: 95 BPM

## 2024-02-10 LAB
ATRIAL RATE: 79 BPM
P AXIS: 95 DEGREES
P OFFSET: 201 MS
P ONSET: 148 MS
PR INTERVAL: 136 MS
Q ONSET: 216 MS
QRS COUNT: 12 BEATS
QRS DURATION: 104 MS
QT INTERVAL: 416 MS
QTC CALCULATION(BAZETT): 477 MS
QTC FREDERICIA: 456 MS
R AXIS: 96 DEGREES
T AXIS: -77 DEGREES
T OFFSET: 424 MS
VENTRICULAR RATE: 79 BPM

## 2024-02-17 LAB
ATRIAL RATE: 118 BPM
ATRIAL RATE: 79 BPM
P AXIS: 74 DEGREES
P AXIS: 97 DEGREES
P OFFSET: 194 MS
P OFFSET: 205 MS
P ONSET: 149 MS
P ONSET: 166 MS
PR INTERVAL: 128 MS
PR INTERVAL: 142 MS
Q ONSET: 220 MS
Q ONSET: 220 MS
QRS COUNT: 13 BEATS
QRS COUNT: 20 BEATS
QRS DURATION: 78 MS
QRS DURATION: 96 MS
QT INTERVAL: 338 MS
QT INTERVAL: 428 MS
QTC CALCULATION(BAZETT): 473 MS
QTC CALCULATION(BAZETT): 490 MS
QTC FREDERICIA: 423 MS
QTC FREDERICIA: 469 MS
R AXIS: 52 DEGREES
R AXIS: 71 DEGREES
T AXIS: 138 DEGREES
T AXIS: 252 DEGREES
T OFFSET: 389 MS
T OFFSET: 434 MS
VENTRICULAR RATE: 118 BPM
VENTRICULAR RATE: 79 BPM